# Patient Record
Sex: FEMALE | Race: WHITE | NOT HISPANIC OR LATINO | Employment: OTHER | URBAN - METROPOLITAN AREA
[De-identification: names, ages, dates, MRNs, and addresses within clinical notes are randomized per-mention and may not be internally consistent; named-entity substitution may affect disease eponyms.]

---

## 2018-11-04 ENCOUNTER — HOSPITAL ENCOUNTER (EMERGENCY)
Facility: HOSPITAL | Age: 68
Discharge: HOME | End: 2018-11-04
Attending: EMERGENCY MEDICINE | Admitting: EMERGENCY MEDICINE

## 2018-11-04 ENCOUNTER — APPOINTMENT (OUTPATIENT)
Dept: GENERAL RADIOLOGY | Facility: HOSPITAL | Age: 68
End: 2018-11-04

## 2018-11-04 VITALS
WEIGHT: 150 LBS | RESPIRATION RATE: 18 BRPM | BODY MASS INDEX: 26.58 KG/M2 | DIASTOLIC BLOOD PRESSURE: 90 MMHG | OXYGEN SATURATION: 92 % | HEIGHT: 63 IN | TEMPERATURE: 98.5 F | HEART RATE: 79 BPM | SYSTOLIC BLOOD PRESSURE: 136 MMHG

## 2018-11-04 DIAGNOSIS — S52.571A OTHER CLOSED INTRA-ARTICULAR FRACTURE OF DISTAL END OF RIGHT RADIUS, INITIAL ENCOUNTER: ICD-10-CM

## 2018-11-04 DIAGNOSIS — Z86.79 HISTORY OF HYPERTENSION: ICD-10-CM

## 2018-11-04 DIAGNOSIS — G35 MULTIPLE SCLEROSIS (HCC): ICD-10-CM
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
## 2018-11-04 PROCEDURE — 99283 EMERGENCY DEPT VISIT LOW MDM: CPT

## 2018-11-04 PROCEDURE — 73110 X-RAY EXAM OF WRIST: CPT

## 2018-11-04 RX ORDER — HYDROCODONE BITARTRATE AND ACETAMINOPHEN 5; 325 MG/1; MG/1
TABLET ORAL
Status: DISCONTINUED
Start: 2018-11-04 | End: 2018-11-04 | Stop reason: HOSPADM

## 2018-11-04 RX ORDER — TIZANIDINE 4 MG/1
4 TABLET ORAL NIGHTLY
Status: ON HOLD | COMMUNITY
End: 2018-11-14

## 2018-11-04 RX ORDER — NITROGLYCERIN 400 UG/1
1 SPRAY ORAL
Status: ON HOLD | COMMUNITY
End: 2018-11-14 | Stop reason: ALTCHOICE

## 2018-11-04 RX ORDER — AMITRIPTYLINE HYDROCHLORIDE 50 MG/1
100 TABLET, FILM COATED ORAL NIGHTLY
COMMUNITY
End: 2019-09-24

## 2018-11-04 RX ORDER — BACLOFEN 10 MG/1
10 TABLET ORAL 3 TIMES DAILY
COMMUNITY
End: 2019-08-21

## 2018-11-04 RX ORDER — ASCORBIC ACID 500 MG
500 TABLET ORAL DAILY
COMMUNITY

## 2018-11-04 RX ORDER — CARVEDILOL 25 MG/1
25 TABLET ORAL DAILY
COMMUNITY

## 2018-11-04 RX ORDER — GABAPENTIN 300 MG/1
300 CAPSULE ORAL 3 TIMES DAILY
COMMUNITY
End: 2019-11-05

## 2018-11-04 RX ORDER — HYDROXYZINE HYDROCHLORIDE 25 MG/1
25 TABLET, FILM COATED ORAL 3 TIMES DAILY PRN
COMMUNITY
End: 2019-11-05

## 2018-11-04 RX ORDER — HYDROCODONE BITARTRATE AND ACETAMINOPHEN 5; 325 MG/1; MG/1
1 TABLET ORAL ONCE
Status: COMPLETED | OUTPATIENT
Start: 2018-11-04 | End: 2018-11-04

## 2018-11-04 RX ORDER — MULTIVIT WITH MINERALS/LUTEIN
2000 TABLET ORAL DAILY
COMMUNITY
End: 2018-11-13

## 2018-11-04 RX ORDER — HYDROCODONE BITARTRATE AND ACETAMINOPHEN 10; 325 MG/1; MG/1
1 TABLET ORAL EVERY 6 HOURS PRN
COMMUNITY
End: 2022-12-12

## 2018-11-04 RX ORDER — POLYETHYLENE GLYCOL 3350 17 G/17G
17 POWDER, FOR SOLUTION ORAL AS NEEDED
COMMUNITY

## 2018-11-04 RX ORDER — OMEPRAZOLE 20 MG/1
20 CAPSULE, DELAYED RELEASE ORAL DAILY
COMMUNITY
End: 2020-06-03 | Stop reason: DRUGHIGH

## 2018-11-04 RX ORDER — ALENDRONATE SODIUM 70 MG/1
70 TABLET ORAL
COMMUNITY

## 2018-11-04 RX ORDER — ALPRAZOLAM 1 MG/1
1.5 TABLET ORAL 2 TIMES DAILY PRN
COMMUNITY
End: 2022-12-12

## 2018-11-04 RX ORDER — ATORVASTATIN CALCIUM 40 MG/1
40 TABLET, FILM COATED ORAL NIGHTLY
COMMUNITY

## 2018-11-04 RX ORDER — ASPIRIN 81 MG/1
81 TABLET, CHEWABLE ORAL DAILY
COMMUNITY
End: 2018-11-13

## 2018-11-04 RX ADMIN — HYDROCODONE BITARTRATE AND ACETAMINOPHEN 1 TABLET: 5; 325 TABLET ORAL at 01:05

## 2018-11-04 NOTE — DISCHARGE INSTRUCTIONS
X-rays reveal impacted right distal radius fracture.  We have applied a splint.  She needs to continue her Norco as directed.  Follow up closely with orthopedist, Dr. Parks.  Return if worsening symptoms of swelling, numbness or tingling, or coolness to the touch on the right hand.  Ice as needed for swelling.

## 2018-11-04 NOTE — ED PROVIDER NOTES
Subjective   Ms. Odalis Mir is a 68 year old female with a hx of multiple sclerosis who presents to the ED with c/o a wrist injury s/p fall. Patient reports that she was walking in her home this evening around 2200 when she slipped and fell. Denies a head injury or loss of consciousness. States she landed on buttocks, but struck her right wrist and elbow against the floor trying to break her fall. Presents to the ED this evening with an obvious deformity to her right wrist. Endorses a considerable amount of pain in her right wrist, accompanied by tinging in her fingers and a numb sensation into her right forearm. Also notes a small abrasion to her right elbow, but denies any pain  Denies any back pain, neck pain, chest pain, or abdominal pain. No other pain in her other extremities. Denies a hx of prior injury to the right wrist but recalls a hx of carpal tunnel surgery and ganglion cystectomy in the right wrist. Right-handed.        History provided by:  Patient  Wrist Injury   Location:  Wrist  Wrist location:  R wrist  Injury: yes    Time since incident:  1 hour  Mechanism of injury: fall    Fall:     Fall occurred: slipped.    Impact surface:  Hard floor    Point of impact:  Buttocks and outstretched arms    Entrapped after fall: no    Pain details:     Quality:  Tingling    Radiates to:  R forearm    Severity:  Moderate    Onset quality:  Sudden    Timing:  Constant    Progression:  Unchanged  Handedness:  Right-handed  Foreign body present:  No foreign bodies  Relieved by:  Being still  Worsened by:  Movement  Associated symptoms: decreased range of motion, numbness and tingling    Associated symptoms: no back pain, no fatigue, no fever, no muscle weakness, no neck pain, no stiffness and no swelling        Review of Systems   Constitutional: Negative for chills, diaphoresis, fatigue and fever.   Respiratory: Negative for cough and shortness of breath.    Cardiovascular: Negative for chest pain and  palpitations.   Gastrointestinal: Negative for abdominal pain, diarrhea, nausea and vomiting.   Musculoskeletal: Positive for arthralgias (R wrist pain/deformity). Negative for back pain, neck pain and stiffness.   Neurological: Negative for dizziness and headaches.        Positive numbness/tingling   All other systems reviewed and are negative.      Past Medical History:   Diagnosis Date   • Multiple sclerosis (CMS/HCC)    • Osteoarthritis        Allergies   Allergen Reactions   • Shellfish-Derived Products Anaphylaxis   • Sulfa Antibiotics Anaphylaxis       Past Surgical History:   Procedure Laterality Date   • HYSTERECTOMY         History reviewed. No pertinent family history.    Social History     Social History   • Marital status: Single     Social History Main Topics   • Smoking status: Never Smoker   • Smokeless tobacco: Never Used   • Alcohol use No   • Drug use: No   • Sexual activity: Defer     Other Topics Concern   • Not on file         Objective   Physical Exam   Constitutional: She is oriented to person, place, and time. She appears well-developed and well-nourished. No distress.   HENT:   Head: Normocephalic and atraumatic.   Eyes: Conjunctivae are normal. No scleral icterus.   Neck: Normal range of motion. Neck supple.   Cardiovascular: Normal rate, regular rhythm, normal heart sounds and intact distal pulses.  Exam reveals no gallop and no friction rub.    No murmur heard.  Pulmonary/Chest: Effort normal and breath sounds normal. No respiratory distress. She has no wheezes. She has no rales.   Abdominal: Soft. Bowel sounds are normal. There is no tenderness. There is no guarding.   Musculoskeletal:        Right elbow: No medial epicondyle, no lateral epicondyle and no olecranon process tenderness noted.        Right wrist: She exhibits decreased range of motion, tenderness and deformity.        Cervical back: She exhibits no tenderness.        Thoracic back: She exhibits no tenderness.        Lumbar  back: She exhibits no tenderness.   No cervical, thoracic, or lumbar tenderness. Right wrist has an obvious deformity with limited range of motion and tenderness at the distal radius and ulna. Strong distal pulses, brisk capillary refill. Small abrasion to the right elbow. No right olecranon or epicondyle tenderness.   Neurological: She is alert and oriented to person, place, and time.   Skin: Skin is warm and dry. She is not diaphoretic.   Psychiatric: She has a normal mood and affect. Her behavior is normal.   Nursing note and vitals reviewed.      Procedures         ED Course  ED Course as of Nov 04 0158   Sun Nov 04, 2018 0151 I reviewed plain films of the right wrist and patient has an impacted right distal radius fracture.  There is no evidence of distal ulnar fracture.  Patient is neurovascularly intact with strong right radial and ulnar pulses and brisk capillary refill.  We will apply an Ortho-Glass sugar tong splint and recommend close follow-up with orthopedist, Dr. Parks.  Discussed results and plan of follow-up with patient.  She is to return if any worsening symptoms of swelling, numbness, or coolness to touch on the right hand.  [FC]      ED Course User Index  [FC] Britta Gregory, MARIJA       No results found for this or any previous visit (from the past 24 hour(s)).  Note: In addition to lab results from this visit, the labs listed above may include labs taken at another facility or during a different encounter within the last 24 hours. Please correlate lab times with ED admission and discharge times for further clarification of the services performed during this visit.    XR Wrist 3+ View Right   Final Result   1. Acute, displaced, slightly impacted, intra-articular fracture of the distal    radial metaphysis.    2. Mild dorsal wrist soft tissue swelling.       THIS DOCUMENT HAS BEEN ELECTRONICALLY SIGNED BY DEAN PAZ MD        Vitals:    11/04/18 0034   BP: 153/82   BP Location: Left arm  "  Patient Position: Sitting   Pulse: 82   Resp: 18   Temp: 98.5 °F (36.9 °C)   TempSrc: Oral   SpO2: 94%   Weight: 68 kg (150 lb)   Height: 160 cm (63\")     Medications   HYDROcodone-acetaminophen (NORCO) 5-325 MG per tablet 1 tablet (1 tablet Oral Given 11/4/18 0105)     ECG/EMG Results (last 24 hours)     ** No results found for the last 24 hours. **                      [facility]    Final diagnoses:   Fall, initial encounter   Other closed intra-articular fracture of distal end of right radius, initial encounter   Multiple sclerosis (CMS/[facility])   History of hypertension       Documentation assistance provided by stefanie Betts.  Information recorded by the scribvern was done at my direction and has been verified and validated by me.     Trent Betts  11/04/18 0109       Trent Betts  11/04/18 0127       Britta Gregory PA-C  11/04/18 0158       Britta Gregory PA-C  11/04/18 0158       Britta Gregory PA-C  11/04/18 0158    "

## 2018-11-06 ENCOUNTER — OFFICE VISIT (OUTPATIENT)
Dept: ORTHOPEDIC SURGERY | Facility: CLINIC | Age: 68
End: 2018-11-06

## 2018-11-06 VITALS — HEART RATE: 76 BPM | WEIGHT: 149.91 LBS | HEIGHT: 63 IN | OXYGEN SATURATION: 97 % | BODY MASS INDEX: 26.56 KG/M2

## 2018-11-06 DIAGNOSIS — S52.571A OTHER CLOSED INTRA-ARTICULAR FRACTURE OF DISTAL END OF RIGHT RADIUS, INITIAL ENCOUNTER: Primary | ICD-10-CM

## 2018-11-06 DIAGNOSIS — G35 MULTIPLE SCLEROSIS (HCC): ICD-10-CM

## 2018-11-06 PROCEDURE — 25600 CLTX DST RDL FX/EPHYS SEP WO: CPT | Performed by: ORTHOPAEDIC SURGERY

## 2018-11-06 PROCEDURE — 99204 OFFICE O/P NEW MOD 45 MIN: CPT | Performed by: ORTHOPAEDIC SURGERY

## 2018-11-06 NOTE — PROGRESS NOTES
[facility] [place] [facility] Note    Subjective     Pain of the Right Wrist (Fell at home 11/03/2018- persistent in nature- pain scale 8/10 today- mod factors- hydrocodone, Aleve, immobilization)      MT Mir is a 68 y.o. female.  Patient is a right-hand-dominant female with multiple sclerosis who sustained a ground-level fall on 11/3/2018.  She was seen at the [facility] ER and splinted and sent here for further evaluation and treatment.  She uses Lofstrand-type crutches to help with ambulation.  She is here with a caretaker and a friend from [place].  She's having 8 out of 10 pain.  She has been using hydrocodone and naproxen.     Past Medical History:   Diagnosis Date   • Multiple sclerosis (CMS/HCC)    • Osteoarthritis       Past Surgical History:   Procedure Laterality Date   • HAND SURGERY Right     thumb   • HYSTERECTOMY        Family History   Problem Relation Age of Onset   • COPD Mother    • Hypertension Father      Social History     Social History   • Marital status: Single     Spouse name: N/A   • Number of children: N/A   • Years of education: N/A     Occupational History   • Not on file.     Social History Main Topics   • Smoking status: Never Smoker   • Smokeless tobacco: Never Used   • Alcohol use No   • Drug use: No   • Sexual activity: Defer     Other Topics Concern   • Not on file     Social History Narrative   • No narrative on file      Current Outpatient Prescriptions on File Prior to Visit   Medication Sig Dispense Refill   • alendronate (FOSAMAX) 70 MG tablet Take 70 mg by mouth Every 7 (Seven) Days.     • ALPRAZolam (XANAX) 1 MG tablet Take 1.5 mg by mouth 2 (Two) Times a Day As Needed for Anxiety.     • amitriptyline (ELAVIL) 50 MG tablet Take 50 mg by mouth Every Night. 2 AT NIGHT TIME     • AMLODIPINE BESYLATE PO Take 10 mg by mouth Daily.     • aspirin 81 MG chewable tablet Chew 81 mg Daily.     • atorvastatin (LIPITOR) 40 MG tablet Take 40 mg by mouth Every Night.      • baclofen (LIORESAL) 10 MG tablet Take 10 mg by mouth 3 (Three) Times a Day.     • CARBAMAZEPINE PO Take 200 mg by mouth 5 (Five) Times a Day.     • Carboxymethylcellulose Sodium (STERILE LUBRICANT OP) Apply 1 dose to eye(s) as directed by provider As Needed.     • carvedilol (COREG) 25 MG tablet Take 25 mg by mouth Daily.     • FLUOXETINE HCL PO Take 60 mg by mouth Daily.     • gabapentin (NEURONTIN) 300 MG capsule Take 300 mg by mouth 4 (Four) Times a Day. 3-4 TIMES DAILY     • HYDROcodone-acetaminophen (NORCO)  MG per tablet Take 1 tablet by mouth 3 (Three) Times a Day As Needed for Moderate Pain .     • hydrOXYzine (ATARAX) 25 MG tablet Take 25 mg by mouth As Needed for Itching or Anxiety.     • losartan 50 MG tablet 2 tablet, hydrochlorothiazide 25 MG tablet 1 tablet Take 1 dose by mouth Daily.     • Multiple Vitamins-Minerals (CENTRUM SILVER PO) Take 1 tablet by mouth Daily.     • nitroglycerin (NITROLINGUAL) 0.4 MG/SPRAY spray Place 1 spray under the tongue Every 5 (Five) Minutes As Needed for Chest Pain.     • omeprazole (priLOSEC) 20 MG capsule Take 20 mg by mouth Daily.     • polyethylene glycol (MIRALAX) packet Take 17 g by mouth As Needed.     • tiZANidine (ZANAFLEX) 4 MG tablet Take 4 mg by mouth Every Night.     • Unable to find Take 1 each by mouth 2 (Two) Times a Day. TICFIDERA 240 MG 1 TABLET BID FOR MS     • vitamin C (ASCORBIC ACID) 500 MG tablet Take 500 mg by mouth Daily.     • vitamin E 1000 UNIT capsule Take 2,000 Units by mouth Daily.       No current facility-administered medications on file prior to visit.       Allergies   Allergen Reactions   • Shellfish-Derived Products Anaphylaxis   • Sulfa Antibiotics Anaphylaxis        The following portions of the patient's history were reviewed and updated as appropriate: allergies, current medications, past family history, past medical history, past social history, past surgical history and problem list.    Review of Systems  "  Constitutional: Negative.    HENT: Negative.    Eyes: Negative.    Respiratory: Negative.    Cardiovascular: Negative.    Gastrointestinal: Negative.    Endocrine: Negative.    Genitourinary: Negative.    Musculoskeletal: Positive for arthralgias and joint swelling.   Skin: Negative.    Allergic/Immunologic: Negative.    Neurological: Negative.    Hematological: Negative.    Psychiatric/Behavioral: Negative.         Objective      Physical Exam  Pulse 76   Ht 160 cm (62.99\")   Wt 68 kg (149 lb 14.6 oz)   SpO2 97%   BMI 26.56 kg/m²     Body mass index is 26.56 kg/m².    General  Mental Status - alert  General Appearance - cooperative, well groomed, not in acute distress  Orientation - Oriented X3  Build & Nutrition - well developed and well nourished  Posture - normal posture  Gait - normal gait     Integumentary  Global Assessment  Examination of related systems reveals - no lymphadenopathy  Ears:  No abnormality  Nose:  No mucous drainage  General Characteristics  Overall examination of the patient's skin reveals - no rashes, no evidence of scars, no suspicious lesions and no bruises.  Color - normal coloration of skin.  Vascular: Brisk capillary refill in all extremities    Ortho Exam  Peripheral Vascular   Right Upper Extremity    No cyanotic nail beds    Pink nail beds and rapid capillary refill   Palpation    Radial Pulse - Bilaterally normal    Musculoskeletal   Right Upper Extremity   Radius:    Inspection and Palpation:    Tenderness - Moderately tender and about the wrist    Swelling - present    Effusion - none    Muscle tone - no atrophy    Pulses - +2   Deformities/Malalignments/Discrepancies    Normal bony contour    There is a documented closed fracture : location - right - distal end             Imaging/Studies  We have reviewed x-rays from the hospital system from 11/4/2018.  We have reviewed the report as well.  Patient appears to have an intra-articular displaced fracture of the right distal " radius.  There is a split between the lunate and scaphoid facets without significant widening.  There is loss of volar tilt.    Assessment:  1. Other closed intra-articular fracture of distal end of right radius, initial encounter    2. Multiple sclerosis (CMS/[facility])        Plan:  1. Continue over-the-counter medication as needed for discomfort  2. We had a long discussion with the patient regarding treatment options and alternatives.  At this point, patient is not interested in surgical intervention.  This would help alleviate the loss of volar tilt and prevent further displacement but if she heals and her current position, I think her function will be satisfactory.  We will follow her closely and we have agreed that if she displaces further, we will consider surgical intervention at that point.  Patient will be placed in a sugar tong splint (plaster) for 3 weeks.  If everything looks good after 3 weeks, she'll be placed in a fiberglass short arm cast.  3. She will follow-up in one week with repeat x-rays in plaster.      Medical Decision Making  Management Options : over-the-counter medicine and close treatment of fracture or dislocation  Data/Risk: radiology tests and independent visualization of imaging, lab tests, or EMG/NCV    El Parks MD  11/06/18  5:43 PM

## 2018-11-07 ENCOUNTER — TELEPHONE (OUTPATIENT)
Dept: ORTHOPEDIC SURGERY | Facility: CLINIC | Age: 68
End: 2018-11-07

## 2018-11-07 NOTE — TELEPHONE ENCOUNTER
Just an FYI, the patient canceled her appointment that was made for tomorrow morning with Tanya due to transportation issues. She still has her appointment scheduled for 11/13 with Tanya.

## 2018-11-07 NOTE — TELEPHONE ENCOUNTER
She can come in tomorrow to see Tanya in the am and we can set her up to be done Friday.    Clemencia LUCIA

## 2018-11-07 NOTE — TELEPHONE ENCOUNTER
Patient called back and I advised that Dr. Parks would like to see her in clinic tomorrow on a PA schedule. She stated that she would call back if unable to make it in and keep her appointment for 11/13/18.

## 2018-11-07 NOTE — TELEPHONE ENCOUNTER
PT CALLING BECAUSE SHE WOULD LIKE TO TALK TO SOMEONE ABOUT THE SURGERY SHE POTENTIALLY COULD BE HAVING. HER NUMBER [phone].

## 2018-11-07 NOTE — TELEPHONE ENCOUNTER
The patient stated that she would rather change her mind to having the surgery that Dr. Parks had discussed with her at her appointment. She believes that she will be moving a lot quicker with the surgery versus being casted. What is the next step you want to take with the patient?    Tressa

## 2018-11-13 ENCOUNTER — OFFICE VISIT (OUTPATIENT)
Dept: ORTHOPEDIC SURGERY | Facility: CLINIC | Age: 68
End: 2018-11-13

## 2018-11-13 ENCOUNTER — ANESTHESIA EVENT (OUTPATIENT)
Dept: PERIOP | Facility: HOSPITAL | Age: 68
End: 2018-11-13

## 2018-11-13 ENCOUNTER — APPOINTMENT (OUTPATIENT)
Dept: PREADMISSION TESTING | Facility: HOSPITAL | Age: 68
End: 2018-11-13

## 2018-11-13 ENCOUNTER — PREP FOR SURGERY (OUTPATIENT)
Dept: OTHER | Facility: HOSPITAL | Age: 68
End: 2018-11-13

## 2018-11-13 VITALS — HEIGHT: 63 IN | BODY MASS INDEX: 26.56 KG/M2 | WEIGHT: 149.91 LBS | HEART RATE: 107 BPM | OXYGEN SATURATION: 100 %

## 2018-11-13 VITALS — HEIGHT: 63 IN | WEIGHT: 158.73 LBS | BODY MASS INDEX: 28.12 KG/M2

## 2018-11-13 DIAGNOSIS — S52.571D OTHER INTRAARTICULAR FRACTURE OF LOWER END OF RIGHT RADIUS, SUBSEQUENT ENCOUNTER FOR CLOSED FRACTURE WITH ROUTINE HEALING: Primary | ICD-10-CM

## 2018-11-13 DIAGNOSIS — S52.571D OTHER CLOSED INTRA-ARTICULAR FRACTURE OF DISTAL END OF RIGHT RADIUS WITH ROUTINE HEALING, SUBSEQUENT ENCOUNTER: Primary | ICD-10-CM

## 2018-11-13 PROBLEM — S52.501D CLOSED FRACTURE OF LOWER END OF RIGHT RADIUS WITH ROUTINE HEALING: Status: ACTIVE | Noted: 2018-11-13

## 2018-11-13 LAB
DEPRECATED RDW RBC AUTO: 43.1 FL (ref 37–54)
ERYTHROCYTE [DISTWIDTH] IN BLOOD BY AUTOMATED COUNT: 13.6 % (ref 11.3–14.5)
HCT VFR BLD AUTO: 36 % (ref 34.5–44)
HGB BLD-MCNC: 12 G/DL (ref 11.5–15.5)
MCH RBC QN AUTO: 28.9 PG (ref 27–31)
MCHC RBC AUTO-ENTMCNC: 33.3 G/DL (ref 32–36)
MCV RBC AUTO: 86.7 FL (ref 80–99)
PLATELET # BLD AUTO: 236 10*3/MM3 (ref 150–450)
PMV BLD AUTO: 8.8 FL (ref 6–12)
POTASSIUM BLD-SCNC: 4.2 MMOL/L (ref 3.5–5.5)
RBC # BLD AUTO: 4.15 10*6/MM3 (ref 3.89–5.14)
WBC NRBC COR # BLD: 5.56 10*3/MM3 (ref 3.5–10.8)

## 2018-11-13 PROCEDURE — 85027 COMPLETE CBC AUTOMATED: CPT | Performed by: ANESTHESIOLOGY

## 2018-11-13 PROCEDURE — 93010 ELECTROCARDIOGRAM REPORT: CPT | Performed by: INTERNAL MEDICINE

## 2018-11-13 PROCEDURE — 36415 COLL VENOUS BLD VENIPUNCTURE: CPT

## 2018-11-13 PROCEDURE — 93005 ELECTROCARDIOGRAM TRACING: CPT

## 2018-11-13 PROCEDURE — 84132 ASSAY OF SERUM POTASSIUM: CPT | Performed by: ANESTHESIOLOGY

## 2018-11-13 PROCEDURE — 99024 POSTOP FOLLOW-UP VISIT: CPT | Performed by: PHYSICIAN ASSISTANT

## 2018-11-13 RX ORDER — ACETAMINOPHEN 500 MG
1000 TABLET ORAL ONCE
Status: CANCELLED | OUTPATIENT
Start: 2018-11-13 | End: 2018-11-13

## 2018-11-13 RX ORDER — FAMOTIDINE 10 MG/ML
20 INJECTION, SOLUTION INTRAVENOUS ONCE
Status: CANCELLED | OUTPATIENT
Start: 2018-11-13 | End: 2018-11-13

## 2018-11-13 RX ORDER — CEFAZOLIN SODIUM 2 G/100ML
2 INJECTION, SOLUTION INTRAVENOUS ONCE
Status: CANCELLED | OUTPATIENT
Start: 2018-11-13 | End: 2018-11-13

## 2018-11-13 NOTE — DISCHARGE INSTRUCTIONS

## 2018-11-13 NOTE — PROGRESS NOTES
I have reviewed the notes, assessments, and/or procedures performed by Tanya Lipscomb PA-C, I concur with her documentation of Odalis Mir.    Patient's x-rays were reviewed from this afternoon.  She does not like the surgery on splinting that will be required to control rotation and would like to proceed with open reduction and internal fixation of her right distal radius.  The risks, benefits, and potential hazards were discussed with her and she appears to understand and would like to proceed.  We will get this on the schedule for tomorrow.

## 2018-11-13 NOTE — PROGRESS NOTES
"    [facility] [place] [facility] Note    Subjective     Follow-up (1 week follow up - Other closed intra-articular fracture of distal end of right radius)       MT Mir is a 68 y.o. female.  Patient returns today for 1 week follow-up right distal radius intra-articular comminuted fracture.  Initially she did not want to undergo surgical fixation but now she wants surgery.  She is currently in a sugar tong splint.  She is using both hydrocodone and intermittent use of Aleve for pain control.  Pain scale 6/10.  Severity the pain moderate.  Quality pain dull, aching, throbbing.  No reported fever, chills, night sweats or other constitutional symptoms.  Denies numbness and tingling into the extremity.    Patient states she was taking a blood thinner until approximately 1 month ago she was cleared by her cardiologist to stop that particular medication.      Objective      Physical Exam  Pulse 107   Ht 160 cm (62.99\")   Wt 68 kg (149 lb 14.6 oz)   SpO2 100%   BMI 26.56 kg/m²     Body mass index is 26.56 kg/m².        Ortho Exam  Peripheral Vascular   Right Upper Extremity    No cyanotic nail beds    Pink nail beds and rapid capillary refill   Palpation    Radial Pulse - Bilaterally normal    Musculoskeletal   Right Upper Extremity   Radius:    Inspection and Palpation: Sugar tong splint was left in place    Tenderness - subjectively reports pain to the wrist    Swelling - present    Effusion - none    Muscle tone - no atrophy    Pulses - +2    Motor/sensory - grossly intact C5-T1   Deformities/Malalignments/Discrepancies    Normal bony contour    There is a documented closed fracture : location - right - distal end         Imaging/Studies  Imaging Results (last 24 hours)     Procedure Component Value Units Date/Time    XR Wrist 3+ View Right [[id number]] Resulted:  11/13/18 1402     Updated:  11/13/18 1403    Narrative:       Right Wrist X-Ray    Indication: Pain    Views:  AP, Lateral, and Oblique "     Comparison: Right wrist 11/4/2018    Findings:  The fracture of the right distal radius appears to be stable and alignment   and appearance.  There has been loss of radial inclination in the interim.    The volar tilt is stable in appearance.  There is splinting material in   place which appears some of the fine detail.  Normal soft tissues  Normal joint spaces  Alignment appears acceptable.      Impression:  Fracture of the right distal radius without significant   interim displacement.        Ordered plain film imaging of the right wrist in the splint.  Images reviewed by Dr. Parks.  She has had some loss of radial inclination.  Otherwise no further displacement noted to the right distal radius when compared to previous imaging.  See chart for official report.    Assessment:  1. Other intraarticular fracture of lower end of right radius, subsequent encounter for closed fracture with routine healing        Plan:  1. Discussion was had the patient regarding exam, imaging, assessment and treatment options.  2. Reviewed with her the risks, indications and benefits of surgery versus nonoperative management.  Patient stated she would still like to proceed on with operative management.  3. Patient will do taken up from scheduled for surgery tomorrow with Dr. Parks on 11/14/18.  4. Her sugar tong splint is to remain in place until we take off in the OR tomorrow for ORIF to right distal radius.  5. Questions and concerns answered.    Indications, risks, benefits and alternatives of surgical versus continued nonsurgical treatment were discussed with the patient. Surgical risks include but are not limited to pain, bleeding, infection, failure to relieve symptoms, need for further procedures, recurrence of symptoms, damage to healthy adjacent structures, hardware loosening/failure, malunion/nonunion, stiffness, weakness, scar, DVT, loss of limb or life. We also discussed the postoperative protocol and expected  outcome. All questions were answered; the patient would like to proceed with surgical intervention.       Tanya Lipscomb PA-C  11/13/18  3:05 PM

## 2018-11-13 NOTE — H&P (VIEW-ONLY)
"    [facility] – [place], [place] Orthopaedic Surgery Clinic Note    Subjective     Follow-up (1 week follow up - Other closed intra-articular fracture of distal end of right radius)       MT Mir is a 68 y.o. female.  Patient returns today for 1 week follow-up right distal radius intra-articular comminuted fracture.  Initially she did not want to undergo surgical fixation but now she wants surgery.  She is currently in a sugar tong splint.  She is using both hydrocodone and intermittent use of Aleve for pain control.  Pain scale 6/10.  Severity the pain moderate.  Quality pain dull, aching, throbbing.  No reported fever, chills, night sweats or other constitutional symptoms.  Denies numbness and tingling into the extremity.    Patient states she was taking a blood thinner until approximately 1 month ago she was cleared by her cardiologist to stop that particular medication.      Objective      Physical Exam  Pulse 107   Ht 160 cm (62.99\")   Wt 68 kg (149 lb 14.6 oz)   SpO2 100%   BMI 26.56 kg/m²     Body mass index is 26.56 kg/m².        Ortho Exam  Peripheral Vascular   Right Upper Extremity    No cyanotic nail beds    Pink nail beds and rapid capillary refill   Palpation    Radial Pulse - Bilaterally normal    Musculoskeletal   Right Upper Extremity   Radius:    Inspection and Palpation: Sugar tong splint was left in place    Tenderness - subjectively reports pain to the wrist    Swelling - present    Effusion - none    Muscle tone - no atrophy    Pulses - +2    Motor/sensory - grossly intact C5-T1   Deformities/Malalignments/Discrepancies    Normal bony contour    There is a documented closed fracture : location - right - distal end         Imaging/Studies  Imaging Results (last 24 hours)     Procedure Component Value Units Date/Time    XR Wrist 3+ View Right [[id number]] Resulted:  11/13/18 1402     Updated:  11/13/18 1403    Narrative:       Right Wrist X-Ray    Indication: Pain    Views:  AP, Lateral, and Oblique "     Comparison: Right wrist 11/4/2018    Findings:  The fracture of the right distal radius appears to be stable and alignment   and appearance.  There has been loss of radial inclination in the interim.    The volar tilt is stable in appearance.  There is splinting material in   place which appears some of the fine detail.  Normal soft tissues  Normal joint spaces  Alignment appears acceptable.      Impression:  Fracture of the right distal radius without significant   interim displacement.        Ordered plain film imaging of the right wrist in the splint.  Images reviewed by Dr. Parks.  She has had some loss of radial inclination.  Otherwise no further displacement noted to the right distal radius when compared to previous imaging.  See chart for official report.    Assessment:  1. Other intraarticular fracture of lower end of right radius, subsequent encounter for closed fracture with routine healing        Plan:  1. Discussion was had the patient regarding exam, imaging, assessment and treatment options.  2. Reviewed with her the risks, indications and benefits of surgery versus nonoperative management.  Patient stated she would still like to proceed on with operative management.  3. Patient will do taken up from scheduled for surgery tomorrow with Dr. Parks on 11/14/18.  4. Her sugar tong splint is to remain in place until we take off in the OR tomorrow for ORIF to right distal radius.  5. Questions and concerns answered.    Indications, risks, benefits and alternatives of surgical versus continued nonsurgical treatment were discussed with the patient. Surgical risks include but are not limited to pain, bleeding, infection, failure to relieve symptoms, need for further procedures, recurrence of symptoms, damage to healthy adjacent structures, hardware loosening/failure, malunion/nonunion, stiffness, weakness, scar, DVT, loss of limb or life. We also discussed the postoperative protocol and expected  outcome. All questions were answered; the patient would like to proceed with surgical intervention.       Tanya Lipscomb PA-C  11/13/18  3:05 PM

## 2018-11-14 ENCOUNTER — ANESTHESIA (OUTPATIENT)
Dept: PERIOP | Facility: HOSPITAL | Age: 68
End: 2018-11-14

## 2018-11-14 ENCOUNTER — HOSPITAL ENCOUNTER (OUTPATIENT)
Facility: HOSPITAL | Age: 68
Discharge: HOME | End: 2018-11-14
Attending: ORTHOPAEDIC SURGERY | Admitting: ORTHOPAEDIC SURGERY

## 2018-11-14 VITALS
BODY MASS INDEX: 28 KG/M2 | RESPIRATION RATE: 16 BRPM | SYSTOLIC BLOOD PRESSURE: 137 MMHG | DIASTOLIC BLOOD PRESSURE: 91 MMHG | OXYGEN SATURATION: 97 % | HEIGHT: 63 IN | TEMPERATURE: 98 F | HEART RATE: 90 BPM | WEIGHT: 158 LBS

## 2018-11-14 DIAGNOSIS — S52.571D OTHER CLOSED INTRA-ARTICULAR FRACTURE OF DISTAL END OF RIGHT RADIUS WITH ROUTINE HEALING, SUBSEQUENT ENCOUNTER: ICD-10-CM

## 2018-11-14 PROBLEM — S52.501A CLOSED FRACTURE OF RIGHT DISTAL RADIUS: Status: ACTIVE | Noted: 2018-11-14

## 2018-11-14 LAB — GLUCOSE BLDC GLUCOMTR-MCNC: 95 MG/DL (ref 70–130)

## 2018-11-14 PROCEDURE — 25010000002 PROPOFOL 10 MG/ML EMULSION: Performed by: NURSE ANESTHETIST, CERTIFIED REGISTERED

## 2018-11-14 PROCEDURE — 25609 OPTX DST RD XART FX/EP SEP3+: CPT | Performed by: ORTHOPAEDIC SURGERY

## 2018-11-14 PROCEDURE — C1713 ANCHOR/SCREW BN/BN,TIS/BN: HCPCS | Performed by: ORTHOPAEDIC SURGERY

## 2018-11-14 PROCEDURE — 25609 OPTX DST RD XART FX/EP SEP3+: CPT | Performed by: PHYSICIAN ASSISTANT

## 2018-11-14 PROCEDURE — A9270 NON-COVERED ITEM OR SERVICE: HCPCS | Performed by: ORTHOPAEDIC SURGERY

## 2018-11-14 PROCEDURE — 25010000003 CEFAZOLIN IN DEXTROSE 2-4 GM/100ML-% SOLUTION: Performed by: ORTHOPAEDIC SURGERY

## 2018-11-14 PROCEDURE — 25010000002 FENTANYL CITRATE (PF) 100 MCG/2ML SOLUTION: Performed by: NURSE ANESTHETIST, CERTIFIED REGISTERED

## 2018-11-14 PROCEDURE — 82962 GLUCOSE BLOOD TEST: CPT

## 2018-11-14 PROCEDURE — 25010000002 KETOROLAC TROMETHAMINE PER 15 MG: Performed by: NURSE ANESTHETIST, CERTIFIED REGISTERED

## 2018-11-14 PROCEDURE — A9270 NON-COVERED ITEM OR SERVICE: HCPCS | Performed by: ANESTHESIOLOGY

## 2018-11-14 PROCEDURE — 63710000001 FAMOTIDINE 20 MG TABLET: Performed by: ANESTHESIOLOGY

## 2018-11-14 PROCEDURE — 63710000001 ACETAMINOPHEN 500 MG TABLET: Performed by: ORTHOPAEDIC SURGERY

## 2018-11-14 PROCEDURE — 25010000002 DEXAMETHASONE PER 1 MG: Performed by: NURSE ANESTHETIST, CERTIFIED REGISTERED

## 2018-11-14 PROCEDURE — 25010000002 ONDANSETRON PER 1 MG: Performed by: NURSE ANESTHETIST, CERTIFIED REGISTERED

## 2018-11-14 DEVICE — IMPLANTABLE DEVICE: Type: IMPLANTABLE DEVICE | Site: WRIST | Status: FUNCTIONAL

## 2018-11-14 DEVICE — SCRW CORT 3.5X12MM: Type: IMPLANTABLE DEVICE | Site: WRIST | Status: FUNCTIONAL

## 2018-11-14 RX ORDER — FENTANYL CITRATE 50 UG/ML
50 INJECTION, SOLUTION INTRAMUSCULAR; INTRAVENOUS
Status: DISCONTINUED | OUTPATIENT
Start: 2018-11-14 | End: 2018-11-14 | Stop reason: HOSPADM

## 2018-11-14 RX ORDER — CEFAZOLIN SODIUM 2 G/100ML
2 INJECTION, SOLUTION INTRAVENOUS ONCE
Status: COMPLETED | OUTPATIENT
Start: 2018-11-14 | End: 2018-11-14

## 2018-11-14 RX ORDER — LIDOCAINE HYDROCHLORIDE 10 MG/ML
INJECTION, SOLUTION INFILTRATION; PERINEURAL AS NEEDED
Status: DISCONTINUED | OUTPATIENT
Start: 2018-11-14 | End: 2018-11-14 | Stop reason: SURG

## 2018-11-14 RX ORDER — SODIUM CHLORIDE 0.9 % (FLUSH) 0.9 %
3-10 SYRINGE (ML) INJECTION AS NEEDED
Status: DISCONTINUED | OUTPATIENT
Start: 2018-11-14 | End: 2018-11-14 | Stop reason: HOSPADM

## 2018-11-14 RX ORDER — PROPOFOL 10 MG/ML
VIAL (ML) INTRAVENOUS CONTINUOUS PRN
Status: DISCONTINUED | OUTPATIENT
Start: 2018-11-14 | End: 2018-11-14 | Stop reason: SURG

## 2018-11-14 RX ORDER — PROMETHAZINE HYDROCHLORIDE 25 MG/1
25 SUPPOSITORY RECTAL ONCE AS NEEDED
Status: DISCONTINUED | OUTPATIENT
Start: 2018-11-14 | End: 2018-11-14 | Stop reason: HOSPADM

## 2018-11-14 RX ORDER — PROPOFOL 10 MG/ML
VIAL (ML) INTRAVENOUS AS NEEDED
Status: DISCONTINUED | OUTPATIENT
Start: 2018-11-14 | End: 2018-11-14 | Stop reason: SURG

## 2018-11-14 RX ORDER — SODIUM CHLORIDE, SODIUM LACTATE, POTASSIUM CHLORIDE, CALCIUM CHLORIDE 600; 310; 30; 20 MG/100ML; MG/100ML; MG/100ML; MG/100ML
9 INJECTION, SOLUTION INTRAVENOUS CONTINUOUS
Status: DISCONTINUED | OUTPATIENT
Start: 2018-11-14 | End: 2018-11-14 | Stop reason: HOSPADM

## 2018-11-14 RX ORDER — FAMOTIDINE 20 MG/1
20 TABLET, FILM COATED ORAL ONCE
Status: COMPLETED | OUTPATIENT
Start: 2018-11-14 | End: 2018-11-14

## 2018-11-14 RX ORDER — EPHEDRINE SULFATE 50 MG/ML
5 INJECTION, SOLUTION INTRAVENOUS ONCE AS NEEDED
Status: DISCONTINUED | OUTPATIENT
Start: 2018-11-14 | End: 2018-11-14 | Stop reason: HOSPADM

## 2018-11-14 RX ORDER — PROMETHAZINE HYDROCHLORIDE 25 MG/ML
6.25 INJECTION, SOLUTION INTRAMUSCULAR; INTRAVENOUS ONCE AS NEEDED
Status: DISCONTINUED | OUTPATIENT
Start: 2018-11-14 | End: 2018-11-14 | Stop reason: HOSPADM

## 2018-11-14 RX ORDER — BUPIVACAINE HYDROCHLORIDE 5 MG/ML
INJECTION, SOLUTION EPIDURAL; INTRACAUDAL AS NEEDED
Status: DISCONTINUED | OUTPATIENT
Start: 2018-11-14 | End: 2018-11-14 | Stop reason: HOSPADM

## 2018-11-14 RX ORDER — DEXAMETHASONE SODIUM PHOSPHATE 4 MG/ML
INJECTION, SOLUTION INTRA-ARTICULAR; INTRALESIONAL; INTRAMUSCULAR; INTRAVENOUS; SOFT TISSUE AS NEEDED
Status: DISCONTINUED | OUTPATIENT
Start: 2018-11-14 | End: 2018-11-14 | Stop reason: SURG

## 2018-11-14 RX ORDER — LIDOCAINE HYDROCHLORIDE 10 MG/ML
0.5 INJECTION, SOLUTION EPIDURAL; INFILTRATION; INTRACAUDAL; PERINEURAL ONCE AS NEEDED
Status: COMPLETED | OUTPATIENT
Start: 2018-11-14 | End: 2018-11-14

## 2018-11-14 RX ORDER — ONDANSETRON 2 MG/ML
INJECTION INTRAMUSCULAR; INTRAVENOUS AS NEEDED
Status: DISCONTINUED | OUTPATIENT
Start: 2018-11-14 | End: 2018-11-14 | Stop reason: SURG

## 2018-11-14 RX ORDER — ACETAMINOPHEN 500 MG
1000 TABLET ORAL ONCE
Status: COMPLETED | OUTPATIENT
Start: 2018-11-14 | End: 2018-11-14

## 2018-11-14 RX ORDER — PROMETHAZINE HYDROCHLORIDE 25 MG/1
25 TABLET ORAL ONCE AS NEEDED
Status: DISCONTINUED | OUTPATIENT
Start: 2018-11-14 | End: 2018-11-14 | Stop reason: HOSPADM

## 2018-11-14 RX ORDER — KETOROLAC TROMETHAMINE 30 MG/ML
INJECTION, SOLUTION INTRAMUSCULAR; INTRAVENOUS AS NEEDED
Status: DISCONTINUED | OUTPATIENT
Start: 2018-11-14 | End: 2018-11-14 | Stop reason: SURG

## 2018-11-14 RX ORDER — FENTANYL CITRATE 50 UG/ML
INJECTION, SOLUTION INTRAMUSCULAR; INTRAVENOUS AS NEEDED
Status: DISCONTINUED | OUTPATIENT
Start: 2018-11-14 | End: 2018-11-14 | Stop reason: SURG

## 2018-11-14 RX ORDER — SODIUM CHLORIDE 0.9 % (FLUSH) 0.9 %
3 SYRINGE (ML) INJECTION EVERY 12 HOURS SCHEDULED
Status: DISCONTINUED | OUTPATIENT
Start: 2018-11-14 | End: 2018-11-14 | Stop reason: HOSPADM

## 2018-11-14 RX ORDER — NITROGLYCERIN 0.4 MG/1
0.4 TABLET SUBLINGUAL
COMMUNITY
End: 2022-12-12

## 2018-11-14 RX ADMIN — FAMOTIDINE 20 MG: 20 TABLET ORAL at 12:02

## 2018-11-14 RX ADMIN — KETOROLAC TROMETHAMINE 15 MG: 30 INJECTION, SOLUTION INTRAMUSCULAR at 15:58

## 2018-11-14 RX ADMIN — FENTANYL CITRATE 50 MCG: 50 INJECTION, SOLUTION INTRAMUSCULAR; INTRAVENOUS at 16:35

## 2018-11-14 RX ADMIN — FENTANYL CITRATE 50 MCG: 50 INJECTION, SOLUTION INTRAMUSCULAR; INTRAVENOUS at 16:50

## 2018-11-14 RX ADMIN — PROPOFOL 25 MCG/KG/MIN: 10 INJECTION, EMULSION INTRAVENOUS at 14:50

## 2018-11-14 RX ADMIN — LIDOCAINE HYDROCHLORIDE 50 MG: 10 INJECTION, SOLUTION INFILTRATION; PERINEURAL at 14:41

## 2018-11-14 RX ADMIN — FENTANYL CITRATE 50 MCG: 50 INJECTION, SOLUTION INTRAMUSCULAR; INTRAVENOUS at 15:38

## 2018-11-14 RX ADMIN — SODIUM CHLORIDE, POTASSIUM CHLORIDE, SODIUM LACTATE AND CALCIUM CHLORIDE 9 ML/HR: 600; 310; 30; 20 INJECTION, SOLUTION INTRAVENOUS at 11:56

## 2018-11-14 RX ADMIN — PROPOFOL 150 MG: 10 INJECTION, EMULSION INTRAVENOUS at 14:41

## 2018-11-14 RX ADMIN — DEXAMETHASONE SODIUM PHOSPHATE 8 MG: 4 INJECTION, SOLUTION INTRAMUSCULAR; INTRAVENOUS at 14:41

## 2018-11-14 RX ADMIN — LIDOCAINE HYDROCHLORIDE 0.2 ML: 10 INJECTION, SOLUTION EPIDURAL; INFILTRATION; INTRACAUDAL; PERINEURAL at 11:56

## 2018-11-14 RX ADMIN — FENTANYL CITRATE 50 MCG: 50 INJECTION, SOLUTION INTRAMUSCULAR; INTRAVENOUS at 14:41

## 2018-11-14 RX ADMIN — ACETAMINOPHEN 1000 MG: 500 TABLET, FILM COATED ORAL at 12:02

## 2018-11-14 RX ADMIN — CEFAZOLIN SODIUM 2 G: 2 INJECTION, SOLUTION INTRAVENOUS at 14:37

## 2018-11-14 RX ADMIN — FENTANYL CITRATE 50 MCG: 50 INJECTION, SOLUTION INTRAMUSCULAR; INTRAVENOUS at 15:33

## 2018-11-14 RX ADMIN — FENTANYL CITRATE 50 MCG: 50 INJECTION, SOLUTION INTRAMUSCULAR; INTRAVENOUS at 17:05

## 2018-11-14 RX ADMIN — ONDANSETRON 4 MG: 2 INJECTION INTRAMUSCULAR; INTRAVENOUS at 15:57

## 2018-11-14 RX ADMIN — FENTANYL CITRATE 50 MCG: 50 INJECTION, SOLUTION INTRAMUSCULAR; INTRAVENOUS at 16:45

## 2018-11-14 RX ADMIN — FENTANYL CITRATE 50 MCG: 50 INJECTION, SOLUTION INTRAMUSCULAR; INTRAVENOUS at 15:00

## 2018-11-14 NOTE — INTERVAL H&P NOTE
Pre-Op H&P (See Recent Office Note Attached for Full H&P)    Chief complaint: Right wrist fracture    HPI:      Patient is a 68 y.o. female who presents with a  right distal radius intra-articular comminuted fracture.  Initially, she did not want to undergo surgical fixation, but now she wants surgery.  She is currently in a sugar tong splint.  She is using both hydrocodone and intermittent use of Aleve for pain control.  Pain scale 6/10.  Severity the pain moderate.  Quality pain dull, aching, throbbing.  No reported fever, chills, night sweats or other constitutional symptoms.  Denies numbness and tingling into the extremity. She has elected to proceed with an open reduction internal fixation right distal radius fracture.      Review of Systems:  General ROS:  no fever, chills, rashes, No change since last office visit  Cardiovascular ROS: no chest pain or dyspnea on exertion; +HTN, +hyperlipidemia  Respiratory ROS: no cough, shortness of breath, or wheezing      Meds:    No current facility-administered medications on file prior to encounter.      Current Outpatient Medications on File Prior to Encounter   Medication Sig Dispense Refill   • alendronate (FOSAMAX) 70 MG tablet Take 70 mg by mouth Every 7 (Seven) Days. Takes on MON     • ALPRAZolam (XANAX) 1 MG tablet Take 1.5 mg by mouth 2 (Two) Times a Day As Needed for Anxiety.     • amitriptyline (ELAVIL) 50 MG tablet Take 50 mg by mouth Every Night. 2 AT NIGHT TIME     • AMLODIPINE BESYLATE PO Take 10 mg by mouth Daily.     • atorvastatin (LIPITOR) 40 MG tablet Take 40 mg by mouth Every Night.     • baclofen (LIORESAL) 10 MG tablet Take 10 mg by mouth 3 (Three) Times a Day.     • CARBAMAZEPINE PO Take 200 mg by mouth 5 (Five) Times a Day.     • Carboxymethylcellulose Sodium (STERILE LUBRICANT OP) Apply 1 dose to eye(s) as directed by provider As Needed.     • carvedilol (COREG) 25 MG tablet Take 25 mg by mouth Daily.     • FLUOXETINE HCL PO Take 60 mg by mouth  "Daily.     • gabapentin (NEURONTIN) 300 MG capsule Take 300 mg by mouth 4 (Four) Times a Day. 3-4 TIMES DAILY     • HYDROcodone-acetaminophen (NORCO)  MG per tablet Take 1 tablet by mouth 3 (Three) Times a Day As Needed for Moderate Pain .     • hydrOXYzine (ATARAX) 25 MG tablet Take 25 mg by mouth As Needed for Itching or Anxiety.     • losartan 50 MG tablet 2 tablet, hydrochlorothiazide 25 MG tablet 1 tablet Take 1 dose by mouth Daily.     • Multiple Vitamins-Minerals (CENTRUM SILVER PO) Take 1 tablet by mouth Daily.     • omeprazole (priLOSEC) 20 MG capsule Take 20 mg by mouth Daily.     • polyethylene glycol (MIRALAX) packet Take 17 g by mouth As Needed (constipation).     • tiZANidine (ZANAFLEX) 4 MG tablet Take 4 mg by mouth Every Night.     • Unable to find Take 1 each by mouth 2 (Two) Times a Day. TICFIDERA 240 MG 1 TABLET BID FOR MS     • vitamin C (ASCORBIC ACID) 500 MG tablet Take 500 mg by mouth Daily.     • [DISCONTINUED] nitroglycerin (NITROLINGUAL) 0.4 MG/SPRAY spray Place 1 spray under the tongue Every 5 (Five) Minutes As Needed for Chest Pain.         Vital Signs:  /76 (BP Location: Right arm, Patient Position: Lying)   Pulse 71   Temp 97.4 °F (36.3 °C) (Temporal)   Resp 18   Ht 160 cm (63\")   Wt 71.7 kg (158 lb)   SpO2 96%   BMI 27.99 kg/m²     Physical Exam:    CV:  S1S2 regular rate and rhythm, no murmur               Resp:  Clear to auscultation; respirations regular, even and unlabored    Results Review:   I have reviewed the patient's recent clinical results.    Cancer Staging (if applicable)  Cancer Patient: __ yes _X_no __unknown; If yes, clinical stage T:__ N:__M:__, stage group or __N/A    Assessment: Right distal radius intra-articular comminuted fracture    Plan: Open reduction internal fixation right distal radius fracture        Cori Carvajal, APRN  11/14/2018   11:48 AM    "

## 2018-11-14 NOTE — ANESTHESIA PREPROCEDURE EVALUATION
Anesthesia Evaluation     Patient summary reviewed and Nursing notes reviewed   no history of anesthetic complications:  NPO Solid Status: > 8 hours  NPO Liquid Status: > 8 hours           Airway   Mallampati: II  TM distance: >3 FB  Neck ROM: full  No difficulty expected  Dental - normal exam     Pulmonary - negative pulmonary ROS and normal exam    breath sounds clear to auscultation  Cardiovascular - normal exam    ECG reviewed  Rhythm: regular  Rate: normal    (+) hypertension,       Neuro/Psych  (+) seizures (secondary to MS - no recent),       ROS Comment: Multiple sclerosis - no recent exacerbation  GI/Hepatic/Renal/Endo    (+)  GERD well controlled,      Musculoskeletal         ROS comment: Right wrist fx  Abdominal    Substance History      OB/GYN          Other   (+) arthritis                     Anesthesia Plan    ASA 3     general   (No block d/t risk of MS exacerbation)  intravenous induction   Anesthetic plan, all risks, benefits, and alternatives have been provided, discussed and informed consent has been obtained with: patient.    Plan discussed with CRNA.

## 2018-11-14 NOTE — ANESTHESIA PROCEDURE NOTES
Airway  Urgency: elective    Airway not difficult    General Information and Staff    Patient location during procedure: OR  CRNA: [place] CRNA    Indications and Patient Condition  Indications for airway management: airway protection    Preoxygenated: yes  Mask difficulty assessment: 1 - vent by mask    Final Airway Details  Final airway type: supraglottic airway      Successful airway: I-gel  Size 4    Number of attempts at approach: 1    Additional Comments  LMA placed without difficulty, ventilation with assist, equal breath sounds and symmetric chest rise and fall

## 2018-11-14 NOTE — ANESTHESIA POSTPROCEDURE EVALUATION
Patient: Odalis Mir    Procedure Summary     Date:  11/14/18 Room / Location:   [facility] /  [facility]    Anesthesia Start:  1437 Anesthesia Stop:  1622    Procedure:  RIGHT WRIST INTRA  ARTICULAR  DISTAL RADIUS FRACTURE OPEN REDUCTION INTERNAL FIXATION (Right Hand) Diagnosis:       Other closed intra-articular fracture of distal end of right radius with routine healing, subsequent encounter      (Other closed intra-articular fracture of distal end of right radius with routine healing, subsequent encounter [S52.648W])    Surgeon:  El Parks MD Provider:  Bhavik Logan MD    Anesthesia Type:  general ASA Status:  3          Anesthesia Type: general  Last vitals  BP   125/76 (11/14/18 1122)   Temp   97.4 °F (36.3 °C) (11/14/18 1122)   Pulse   71 (11/14/18 1122)   Resp   18 (11/14/18 1122)     SpO2   96 % (11/14/18 1122)     Post Anesthesia Care and Evaluation    Patient location during evaluation: PACU  Patient participation: complete - patient participated  Level of consciousness: awake and alert  Pain score: 0  Pain management: adequate  Airway patency: patent  Anesthetic complications: No anesthetic complications  PONV Status: none  Cardiovascular status: hemodynamically stable and acceptable  Respiratory status: nonlabored ventilation, acceptable and nasal cannula  Hydration status: acceptable

## 2018-11-14 NOTE — OP NOTE
Orthopaedics Operative Report    PREOPERATIVE DIAGNOSIS: Intra-articular right distal radius fracture    POSTOPERATIVE DIAGNOSIS: Same    PROCEDURE PERFORMED: ORIF right 3 part intra-articular distal radius fracture    CPT: 33994    SURGEON: El Parks MD    ANESTHESIA:  Choice    STAFF:  Circulator: Chayo Hughes RN  Scrub Person: Tahmina Radha N  Nursing Assistant: Mica Toney; Fely Gomez  Assistant: Tanya Lipscomb PA-C    TOURNIQUET TIME: 55 minutes    ESTIMATED BLOOD LOSS: 10 mL    COMPLICATIONS: None apparent.    IMPLANTS: [employer] DVR plate    PREOPERATIVE ANTIBIOTICS: 2 g Kefzol    REFERRING PHYSICIAN: Irene Bailey M.D.    INDICATIONS: Loss of appropriate alignment     DESCRIPTION OF PROCEDURE: After informed consent was obtained, the patient was taken to the operating room.  After the smooth induction of general anesthesia, the right upper extremity was prepped and draped in the usual fashion for this type of procedure.  No block was placed due to the patient's history of MS.  After timeout to verify the site and the procedure to be performed, we began with exsanguination of the right upper extremity using an Esmarch bandage and inflation of tourniquet to 250 mmHg.  We made our standard FCR approach to the right distal radius.  Patient had undergone CMC arthroplasty with ligament reconstruction and FCR tendon interposition and therefore the FCR tendon was not present.  We identified our radial artery.  This was retracted radially and protected throughout the procedure.  We then made an incision in the floor of the FCR tendon sheath and gently swept the FPL tendon ulnarly.  We then identified our pronator quadratus which was incised in L-shaped fashion to expose the volar surface of the distal radius.  The intra-articular fracture was identified and anatomically aligned.  It was then provisionally pinned with a percutaneous pin through the radial styloid.  This  held the fracture in appropriate alignment.  The appropriate sized standard 3-hole volar plate was applied to the slotted shaft hole in the plate.  We then placed 6 points of fixation distally and 2 more points of fixation proximally.  Screws were checked for appropriate depth and changed accordingly.  We then ranged the wrist under fluoroscopic imaging and it was felt to be stable.  The screws distally were felt to be appropriate length as well.  The K wire was then removed.  We thoroughly irrigated the incision.  Our pronator was repaired.  We then deflated tourniquet.  Hemostasis was obtained.  We injected local anesthetic into the subcutaneous tissue and then closed the subcutaneous layer using 3-0 Vicryl and the skin was closed using running 3-0 nylon.  Sterile dressings were then applied.  Patient was placed in a well-padded volar wrist splint and neutral wrist dorsiflexion.  She was then taken to the recovery room in stable condition.  All counts were correct postoperatively and I performed the case.  Tanya Hernandez PA-C was present and necessary for positioning, approach, retraction, instrumentation, and closure and application of the splint.    POSTOPERATIVE PLAN:  1. Weight bearing status:  Nonweightbearing right upper extremity  2.  Hydrocodone for pain control  3. Follow-up in 2 weeks for wound check and suture removal  4. Keep incisions clean and dry  5.  Discharge home clear by anesthesia        El Parks M.D.*

## 2018-12-06 ENCOUNTER — OFFICE VISIT (OUTPATIENT)
Dept: ORTHOPEDIC SURGERY | Facility: CLINIC | Age: 68
End: 2018-12-06

## 2018-12-06 DIAGNOSIS — Z98.890 S/P ORIF (OPEN REDUCTION INTERNAL FIXATION) FRACTURE: Primary | ICD-10-CM

## 2018-12-06 DIAGNOSIS — Z87.81 S/P ORIF (OPEN REDUCTION INTERNAL FIXATION) FRACTURE: Primary | ICD-10-CM

## 2018-12-06 DIAGNOSIS — S52.571D OTHER INTRAARTICULAR FRACTURE OF LOWER END OF RIGHT RADIUS, SUBSEQUENT ENCOUNTER FOR CLOSED FRACTURE WITH ROUTINE HEALING: ICD-10-CM

## 2018-12-06 PROCEDURE — 99024 POSTOP FOLLOW-UP VISIT: CPT | Performed by: PHYSICIAN ASSISTANT

## 2018-12-06 NOTE — PROGRESS NOTES
[facility] – [place] Orthopaedic Surgery Clinic Note    Subjective     Post-op (3 weeks status post Right Wrist Intraarticular Distal Radius Fracture Open Reduction Internal Fixation 11/14/18)       MT Mir is a 68 y.o. female.  Patient returns today for initial postop visit status post ORIF right distal radius on the above date by Dr. Parks.  She has no complaints or issues.  Patient removed the splint after 3 or 4 days has not has not been wearing anything on the wrist.  She states that she's been nonweightbearing as directed.  She denies any numbness or tingling into the extremity.  No reported fever, chills, night sweats or other constitutional symptoms.         Objective      Physical Exam  There were no vitals taken for this visit.    There is no height or weight on file to calculate BMI.        Ortho Exam    Right Upper Extremity:        Musculoskeletal     Inspection and Palpation:      Hand/Wrist:      Tenderness - none    Swelling - minimal     ROM:  Slightly diminished but within normal limits    Motor: Grossly intact radial, ulnar, median, AIN, PIN    Sensory: Grossly intact to light touch radial, ulnar, median nerve distributions    Vascular: 2+ radial pulse with brisk capillary refill noted and each digit.       Incision:  Surgical incision site is healing well without redness, warmth, drainage or evidence of infection.  Suture remains in place.    Imaging:  Ordered plain film imaging of the right wrist.  Images reviewed by Dr. Parks.  Positive healing noted to the distal radius.  Alignment remains acceptable.  Hardware is intact.  See chart for official report.      Assessment:  1. S/P ORIF (open reduction internal fixation) fracture    2. Other intraarticular fracture of lower end of right radius, subsequent encounter for closed fracture with routine healing        Plan:  1. Patient was provided with a cockup wrist splint.  2. I offered to send her to physical therapy to help assist  with range of motion but she declined stating she can do the exercises on her own.  A hand and wrist exercise sheet was provided to the patient.  3. Patient understands that she needs to be nonweightbearing to the right wrist.  She reports that that's the arm she uses her crutches in.  She has been using the crutch on the left side since surgery.  She does have a walker and can get a platform for to offload the wrist if she's having issues using a crutch on the left side.  Patient reported that she'll think about this option.  She was informed that if she weightbears on the right side as its healing she could cause collapse to the repair.  4. She'll follow back up in 4 weeks for repeat evaluation, sooner if issues arise.  During that appointment she'll need pre-clinic imaging of the right wrist.  5. Question and concerns answered      Tanya Lipscomb PA-C  12/11/18  8:22 AM

## 2019-01-10 ENCOUNTER — OFFICE VISIT (OUTPATIENT)
Dept: ORTHOPEDIC SURGERY | Facility: CLINIC | Age: 69
End: 2019-01-10

## 2019-01-10 DIAGNOSIS — Z98.890 S/P ORIF (OPEN REDUCTION INTERNAL FIXATION) FRACTURE: Primary | ICD-10-CM

## 2019-01-10 DIAGNOSIS — Z87.81 S/P ORIF (OPEN REDUCTION INTERNAL FIXATION) FRACTURE: Primary | ICD-10-CM

## 2019-01-10 DIAGNOSIS — S52.571D OTHER INTRAARTICULAR FRACTURE OF LOWER END OF RIGHT RADIUS, SUBSEQUENT ENCOUNTER FOR CLOSED FRACTURE WITH ROUTINE HEALING: ICD-10-CM

## 2019-01-10 PROCEDURE — 99024 POSTOP FOLLOW-UP VISIT: CPT | Performed by: PHYSICIAN ASSISTANT

## 2019-01-10 NOTE — PROGRESS NOTES
[facility] – [place] Orthopaedic [facility] Clinic Note    Subjective     Follow-up of the Right Wrist ((8 weeks status post Right Wrist Intraarticular Distal Radius Fracture Open Reduction Internal Fixation 11/14/18))       MT Mir is a 68 y.o. female.  Patient returns today approximately 8 weeks status post open reduction internal fixation right distal radius fracture performed on the above date by Dr. Parks.  She has no complaints or issues.  She denies any pain, catching or popping to the wrist.  She's been using her cane on that side without any issue.  She denies any numbness or tingling into the extremity.  No reported fever, chills, night sweats or other constitutional symptoms.         Objective      Physical Exam  There were no vitals taken for this visit.    There is no height or weight on file to calculate BMI.      Ortho Exam    Right Upper Extremity:          Musculoskeletal                 Inspection and Palpation:                  Hand/Wrist:                              Tenderness - none                          Swelling - none                                 ROM:  Slightly diminished but within normal limits                          Motor: Grossly intact radial, ulnar, median, AIN, PIN                          Sensory: Grossly intact to light touch radial, ulnar, median nerve distributions                          Vascular: 2+ radial pulse with brisk capillary refill noted and each digit.                                        Incision:  Well healed incision without redness, warmth, drainage or evidence of infection.        Imaging:  Ordered plain film imaging of the right wrist.  Images reviewed by Dr. Parks. Continued healing noted to the distal radius.  Hardware is intact.  Joint spaces preserved.  Alignment remains acceptable.  See chart for official report.      Assessment:  1. S/P ORIF (open reduction internal fixation) fracture    2. Other intraarticular fracture of lower end of  right radius, subsequent encounter for closed fracture with routine healing        Plan:  1. Patient may continue advancing activity as tolerated.    2. If it any time she notes any pain, locking, catching, etc. she needs to return to clinic for repeat evaluation.    3. At this time she'll be discharged from the orthopedic clinic with follow-up as necessary.    4. Question and concerns answered.        Tanya Lipscomb PA-C  01/10/19  2:17 PM

## 2019-08-21 ENCOUNTER — OFFICE VISIT (OUTPATIENT)
Dept: NEUROLOGY | Facility: CLINIC | Age: 69
End: 2019-08-21

## 2019-08-21 ENCOUNTER — LAB (OUTPATIENT)
Dept: LAB | Facility: HOSPITAL | Age: 69
End: 2019-08-21

## 2019-08-21 VITALS
HEIGHT: 63 IN | DIASTOLIC BLOOD PRESSURE: 72 MMHG | HEART RATE: 84 BPM | RESPIRATION RATE: 16 BRPM | WEIGHT: 165 LBS | BODY MASS INDEX: 29.23 KG/M2 | OXYGEN SATURATION: 94 % | SYSTOLIC BLOOD PRESSURE: 108 MMHG

## 2019-08-21 DIAGNOSIS — F48.2 PBA (PSEUDOBULBAR AFFECT): ICD-10-CM

## 2019-08-21 DIAGNOSIS — F33.1 MODERATE EPISODE OF RECURRENT MAJOR DEPRESSIVE DISORDER (HCC): ICD-10-CM

## 2019-08-21 DIAGNOSIS — G35 MULTIPLE SCLEROSIS (HCC): Primary | ICD-10-CM

## 2019-08-21 DIAGNOSIS — G40.109 SEIZURE, TEMPORAL LOBE (HCC): ICD-10-CM

## 2019-08-21 DIAGNOSIS — G35 MULTIPLE SCLEROSIS (HCC): ICD-10-CM

## 2019-08-21 LAB
ALBUMIN SERPL-MCNC: 5.1 G/DL (ref 3.5–5.2)
ALBUMIN/GLOB SERPL: 2 G/DL
ALP SERPL-CCNC: 76 U/L (ref 39–117)
ALT SERPL W P-5'-P-CCNC: 11 U/L (ref 1–33)
ANION GAP SERPL CALCULATED.3IONS-SCNC: 8.8 MMOL/L (ref 5–15)
AST SERPL-CCNC: 14 U/L (ref 1–32)
BASOPHILS # BLD AUTO: 0.02 10*3/MM3 (ref 0–0.2)
BASOPHILS NFR BLD AUTO: 0.3 % (ref 0–1.5)
BILIRUB SERPL-MCNC: 0.2 MG/DL (ref 0.2–1.2)
BUN BLD-MCNC: 14 MG/DL (ref 8–23)
BUN/CREAT SERPL: 20.9 (ref 7–25)
CALCIUM SPEC-SCNC: 10.2 MG/DL (ref 8.6–10.5)
CHLORIDE SERPL-SCNC: 93 MMOL/L (ref 98–107)
CO2 SERPL-SCNC: 29.2 MMOL/L (ref 22–29)
CREAT BLD-MCNC: 0.67 MG/DL (ref 0.57–1)
DEPRECATED RDW RBC AUTO: 42.8 FL (ref 37–54)
EOSINOPHIL # BLD AUTO: 0.06 10*3/MM3 (ref 0–0.4)
EOSINOPHIL NFR BLD AUTO: 0.9 % (ref 0.3–6.2)
ERYTHROCYTE [DISTWIDTH] IN BLOOD BY AUTOMATED COUNT: 12.9 % (ref 12.3–15.4)
GFR SERPL CREATININE-BSD FRML MDRD: 87 ML/MIN/1.73
GLOBULIN UR ELPH-MCNC: 2.5 GM/DL
GLUCOSE BLD-MCNC: 98 MG/DL (ref 65–99)
HCT VFR BLD AUTO: 42.6 % (ref 34–46.6)
HGB BLD-MCNC: 13.7 G/DL (ref 12–15.9)
IMM GRANULOCYTES # BLD AUTO: 0.02 10*3/MM3 (ref 0–0.05)
IMM GRANULOCYTES NFR BLD AUTO: 0.3 % (ref 0–0.5)
LYMPHOCYTES # BLD AUTO: 0.69 10*3/MM3 (ref 0.7–3.1)
LYMPHOCYTES NFR BLD AUTO: 10.9 % (ref 19.6–45.3)
MCH RBC QN AUTO: 29.5 PG (ref 26.6–33)
MCHC RBC AUTO-ENTMCNC: 32.2 G/DL (ref 31.5–35.7)
MCV RBC AUTO: 91.8 FL (ref 79–97)
MONOCYTES # BLD AUTO: 0.42 10*3/MM3 (ref 0.1–0.9)
MONOCYTES NFR BLD AUTO: 6.6 % (ref 5–12)
NEUTROPHILS # BLD AUTO: 5.14 10*3/MM3 (ref 1.7–7)
NEUTROPHILS NFR BLD AUTO: 81 % (ref 42.7–76)
NRBC BLD AUTO-RTO: 0 /100 WBC (ref 0–0.2)
PLATELET # BLD AUTO: 242 10*3/MM3 (ref 140–450)
PMV BLD AUTO: 9.8 FL (ref 6–12)
POTASSIUM BLD-SCNC: 5.2 MMOL/L (ref 3.5–5.2)
PROT SERPL-MCNC: 7.6 G/DL (ref 6–8.5)
RBC # BLD AUTO: 4.64 10*6/MM3 (ref 3.77–5.28)
SODIUM BLD-SCNC: 131 MMOL/L (ref 136–145)
WBC NRBC COR # BLD: 6.35 10*3/MM3 (ref 3.4–10.8)

## 2019-08-21 PROCEDURE — 36415 COLL VENOUS BLD VENIPUNCTURE: CPT

## 2019-08-21 PROCEDURE — 80053 COMPREHEN METABOLIC PANEL: CPT

## 2019-08-21 PROCEDURE — 85025 COMPLETE CBC W/AUTO DIFF WBC: CPT

## 2019-08-21 PROCEDURE — 99205 OFFICE O/P NEW HI 60 MIN: CPT | Performed by: PSYCHIATRY & NEUROLOGY

## 2019-08-21 RX ORDER — LORATADINE 10 MG/1
10 TABLET ORAL DAILY
COMMUNITY
End: 2022-06-21

## 2019-08-21 RX ORDER — DIMETHYL FUMARATE 240 MG/1
CAPSULE ORAL
COMMUNITY
End: 2019-10-02

## 2019-08-21 RX ORDER — ASPIRIN 81 MG/1
81 TABLET, CHEWABLE ORAL DAILY
COMMUNITY
End: 2022-12-12

## 2019-08-21 RX ORDER — METHYLPHENIDATE HYDROCHLORIDE 10 MG/1
10 TABLET ORAL 2 TIMES DAILY
COMMUNITY
End: 2020-12-09

## 2019-08-21 RX ORDER — BUPROPION HYDROCHLORIDE 300 MG/1
300 TABLET ORAL DAILY
COMMUNITY
End: 2019-08-21

## 2019-08-21 RX ORDER — FUROSEMIDE 20 MG/1
20 TABLET ORAL 2 TIMES DAILY
COMMUNITY
End: 2019-11-05
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
## 2019-08-27 ENCOUNTER — SPECIALTY PHARMACY (OUTPATIENT)
Dept: ONCOLOGY | Facility: HOSPITAL | Age: 69
End: 2019-08-27

## 2019-09-18 ENCOUNTER — HOSPITAL ENCOUNTER (OUTPATIENT)
Dept: MRI IMAGING | Facility: HOSPITAL | Age: 69
Discharge: HOME | End: 2019-09-18
Admitting: PSYCHIATRY & NEUROLOGY

## 2019-09-18 ENCOUNTER — APPOINTMENT (OUTPATIENT)
Dept: MRI IMAGING | Facility: HOSPITAL | Age: 69
End: 2019-09-18

## 2019-09-18 ENCOUNTER — HOSPITAL ENCOUNTER (OUTPATIENT)
Dept: MRI IMAGING | Facility: HOSPITAL | Age: 69
Discharge: HOME | End: 2019-09-18

## 2019-09-18 DIAGNOSIS — G35 MULTIPLE SCLEROSIS (HCC): ICD-10-CM

## 2019-09-18 PROCEDURE — 70553 MRI BRAIN STEM W/O & W/DYE: CPT

## 2019-09-18 PROCEDURE — 0 GADOBENATE DIMEGLUMINE 529 MG/ML SOLUTION: Performed by: PSYCHIATRY & NEUROLOGY

## 2019-09-18 PROCEDURE — A9577 INJ MULTIHANCE: HCPCS | Performed by: PSYCHIATRY & NEUROLOGY

## 2019-09-18 PROCEDURE — 72156 MRI NECK SPINE W/O & W/DYE: CPT

## 2019-09-18 RX ADMIN — GADOBENATE DIMEGLUMINE 15 ML: 529 INJECTION, SOLUTION INTRAVENOUS at 10:21

## 2019-09-23 ENCOUNTER — SPECIALTY PHARMACY (OUTPATIENT)
Dept: PHARMACY | Facility: HOSPITAL | Age: 69
End: 2019-09-23

## 2019-09-23 NOTE — PROGRESS NOTES
Oral Neurology Medication Teaching        Patient Name/:     Odalis Mir   1950  Oral Neurology Medication Regimen:  Nuedexta 20mg/10mg 1 capsule PO BID  Date Started Medication: 2019           Initial Teaching Follow Up Comments      Safety      Storage instructions (away from children; away from heat/cold, sunlight, or moisture)       “How are you storing your medications?”, reminders on storage, proper handling (away from children, managing waste, etc.), disposal of medication with D/C or dosage change     Patient counseled on appropriate storage of medication. Store at room temp, away from pets and children. Pt verbalized understanding.       Adherence       patient and/or caregiver on how to take medication, take with/without food, assess their adherence potential, stress importance of adherence, ways to manage adherence (pill boxes, phone reminders, calendars), what to do if miss a dose    “How are you taking your medication?” “How are you remembering to take your medication?”, “How many doses have you missed?”, determine reasons for non-adherence (not remembering, side effects, etc), ways to improve, overadherence? Remind patient of ways to improve/maintain adherence    Reviewed plan for Nuedexta 20mg/10mg (1 capsule) by mouth twice a day (doses should be 12 hours apart). Reviewed plan for missed doses. Pt voiced understanding. Discussed importance of compliance. Script processed at [facility] and will be shipped to pt.      Side Effects/Adverse Reactions       patient on potential side effects, s/s, ways to manage, when to call MD/seek help       Determine if patient experiencing side effects, ways to manage  Discussed potential side effects including but not limited to: diarrhea, vomiting, peripheral edema, asthenia, dizziness and cough. Discussed potential serious side effects of QT prolongation and hepatitis.  Pt verbalized understanding.      Miscellaneous      Food  interactions, DDIs, financial issues Determine if patient started any new medications (analyze for DDI) Patient advised to avoid grapefruit, grapefruit juice, alcohol and tonic water. No DDIs identified with Nuedexta.      Additional Notes: Discussed aforementioned material with patient by phone. All questions and concerns addressed. Patient provided with my contact information and instructed to call if additional questions should arise.

## 2019-09-24 ENCOUNTER — OFFICE VISIT (OUTPATIENT)
Dept: NEUROLOGY | Facility: CLINIC | Age: 69
End: 2019-09-24

## 2019-09-24 VITALS
SYSTOLIC BLOOD PRESSURE: 132 MMHG | DIASTOLIC BLOOD PRESSURE: 88 MMHG | WEIGHT: 164 LBS | HEART RATE: 85 BPM | HEIGHT: 63 IN | BODY MASS INDEX: 29.06 KG/M2 | RESPIRATION RATE: 16 BRPM | OXYGEN SATURATION: 94 %

## 2019-09-24 DIAGNOSIS — F48.2 PBA (PSEUDOBULBAR AFFECT): ICD-10-CM

## 2019-09-24 DIAGNOSIS — G35 MULTIPLE SCLEROSIS (HCC): Primary | ICD-10-CM

## 2019-09-24 DIAGNOSIS — G40.109 SEIZURE, TEMPORAL LOBE (HCC): ICD-10-CM

## 2019-09-24 DIAGNOSIS — F33.1 MODERATE EPISODE OF RECURRENT MAJOR DEPRESSIVE DISORDER (HCC): ICD-10-CM

## 2019-09-24 PROCEDURE — 99214 OFFICE O/P EST MOD 30 MIN: CPT | Performed by: PSYCHIATRY & NEUROLOGY
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
## 2019-09-24 NOTE — ASSESSMENT & PLAN NOTE
MRI Brain and cervical spine reviewed no new/enhancing/enlarging lesions    Continue Tecfidera    Stop Elavil over next week

## 2019-09-30 ENCOUNTER — SPECIALTY PHARMACY (OUTPATIENT)
Dept: PHARMACY | Facility: HOSPITAL | Age: 69
End: 2019-09-30

## 2019-09-30 NOTE — PROGRESS NOTES
Oral Neurology Medication Teaching        Patient Name/:     Odalis Mir   1950  Oral Neurology Medication Regimen:  Nuedexta 20mg/10mg 1 capsule PO BID  Date Started Medication: 2019           Initial Teaching Follow Up Comments      Safety      Storage instructions (away from children; away from heat/cold, sunlight, or moisture)       “How are you storing your medications?”, reminders on storage, proper handling (away from children, managing waste, etc.), disposal of medication with D/C or dosage change     Patient reports appropriate storage and handling.      Adherence       patient and/or caregiver on how to take medication, take with/without food, assess their adherence potential, stress importance of adherence, ways to manage adherence (pill boxes, phone reminders, calendars), what to do if miss a dose    “How are you taking your medication?” “How are you remembering to take your medication?”, “How many doses have you missed?”, determine reasons for non-adherence (not remembering, side effects, etc), ways to improve, overadherence? Remind patient of ways to improve/maintain adherence    Confirmed dose of Nuedexta 20mg/10mg (1 capsule) by mouth twice a day (doses should be 12 hours apart). Script processed at [facility] and will be shipped to pt.      Side Effects/Adverse Reactions       patient on potential side effects, s/s, ways to manage, when to call MD/seek help       Determine if patient experiencing side effects, ways to manage  Patient denies adverse effects.      Miscellaneous      Food interactions, DDIs, financial issues Determine if patient started any new medications (analyze for DDI)       Additional Notes: Discussed aforementioned material with patient by phone on 2019 and requested refill be submitted today. All questions and concerns addressed. Patient provided with my contact information and instructed to call if additional questions should arise.  Notified [facility] of refill request.

## 2019-10-02 ENCOUNTER — SPECIALTY PHARMACY (OUTPATIENT)
Dept: ONCOLOGY | Facility: HOSPITAL | Age: 69
End: 2019-10-02

## 2019-10-02 ENCOUNTER — SPECIALTY PHARMACY (OUTPATIENT)
Dept: PHARMACY | Facility: HOSPITAL | Age: 69
End: 2019-10-02

## 2019-10-02 RX ORDER — DIMETHYL FUMARATE 240 MG/1
240 CAPSULE ORAL 2 TIMES DAILY
Qty: 60 CAPSULE | Refills: 11 | Status: SHIPPED | OUTPATIENT
Start: 2019-10-02 | End: 2019-10-02 | Stop reason: SDUPTHER

## 2019-10-02 RX ORDER — DIMETHYL FUMARATE 240 MG/1
240 CAPSULE ORAL 2 TIMES DAILY
Qty: 60 CAPSULE | Refills: 11 | Status: SHIPPED | OUTPATIENT
Start: 2019-10-02 | End: 2020-09-23 | Stop reason: SDUPTHER

## 2019-10-09 ENCOUNTER — HOSPITAL ENCOUNTER (EMERGENCY)
Facility: HOSPITAL | Age: 69
Discharge: HOME | End: 2019-10-09
Attending: EMERGENCY MEDICINE | Admitting: EMERGENCY MEDICINE

## 2019-10-09 ENCOUNTER — APPOINTMENT (OUTPATIENT)
Dept: GENERAL RADIOLOGY | Facility: HOSPITAL | Age: 69
End: 2019-10-09

## 2019-10-09 VITALS
TEMPERATURE: 98.6 F | HEIGHT: 63 IN | RESPIRATION RATE: 20 BRPM | SYSTOLIC BLOOD PRESSURE: 186 MMHG | WEIGHT: 162 LBS | HEART RATE: 75 BPM | BODY MASS INDEX: 28.7 KG/M2 | DIASTOLIC BLOOD PRESSURE: 102 MMHG | OXYGEN SATURATION: 98 %

## 2019-10-09 DIAGNOSIS — J06.9 UPPER RESPIRATORY TRACT INFECTION, UNSPECIFIED TYPE: ICD-10-CM

## 2019-10-09 DIAGNOSIS — E86.0 DEHYDRATION: Primary | ICD-10-CM

## 2019-10-09 DIAGNOSIS — R03.0 ELEVATED BLOOD PRESSURE READING: ICD-10-CM

## 2019-10-09 DIAGNOSIS — Z87.09 HISTORY OF COPD: ICD-10-CM

## 2019-10-09 DIAGNOSIS — E87.6 HYPOKALEMIA: ICD-10-CM

## 2019-10-09 LAB
ALBUMIN SERPL-MCNC: 5.2 G/DL (ref 3.5–5.2)
ALBUMIN/GLOB SERPL: 2 G/DL
ALP SERPL-CCNC: 77 U/L (ref 39–117)
ALT SERPL W P-5'-P-CCNC: 24 U/L (ref 1–33)
ANION GAP SERPL CALCULATED.3IONS-SCNC: 14 MMOL/L (ref 5–15)
AST SERPL-CCNC: 24 U/L (ref 1–32)
BASOPHILS # BLD AUTO: 0.02 10*3/MM3 (ref 0–0.2)
BASOPHILS NFR BLD AUTO: 0.3 % (ref 0–1.5)
BILIRUB SERPL-MCNC: 0.4 MG/DL (ref 0.2–1.2)
BILIRUB UR QL STRIP: NEGATIVE
BUN BLD-MCNC: 6 MG/DL (ref 8–23)
BUN/CREAT SERPL: 12 (ref 7–25)
CALCIUM SPEC-SCNC: 9.6 MG/DL (ref 8.6–10.5)
CHLORIDE SERPL-SCNC: 86 MMOL/L (ref 98–107)
CLARITY UR: CLEAR
CO2 SERPL-SCNC: 28 MMOL/L (ref 22–29)
COLOR UR: YELLOW
CREAT BLD-MCNC: 0.5 MG/DL (ref 0.57–1)
DEPRECATED RDW RBC AUTO: 37.2 FL (ref 37–54)
EOSINOPHIL # BLD AUTO: 0.02 10*3/MM3 (ref 0–0.4)
EOSINOPHIL NFR BLD AUTO: 0.3 % (ref 0.3–6.2)
ERYTHROCYTE [DISTWIDTH] IN BLOOD BY AUTOMATED COUNT: 12.3 % (ref 12.3–15.4)
GFR SERPL CREATININE-BSD FRML MDRD: 122 ML/MIN/1.73
GLOBULIN UR ELPH-MCNC: 2.6 GM/DL
GLUCOSE BLD-MCNC: 123 MG/DL (ref 65–99)
GLUCOSE UR STRIP-MCNC: NEGATIVE MG/DL
HBA1C MFR BLD: 6.1 % (ref 4.8–5.6)
HCT VFR BLD AUTO: 38.9 % (ref 34–46.6)
HGB BLD-MCNC: 13.5 G/DL (ref 12–15.9)
HGB UR QL STRIP.AUTO: NEGATIVE
HOLD SPECIMEN: NORMAL
HOLD SPECIMEN: NORMAL
IMM GRANULOCYTES # BLD AUTO: 0.02 10*3/MM3 (ref 0–0.05)
IMM GRANULOCYTES NFR BLD AUTO: 0.3 % (ref 0–0.5)
KETONES UR QL STRIP: ABNORMAL
LEUKOCYTE ESTERASE UR QL STRIP.AUTO: NEGATIVE
LYMPHOCYTES # BLD AUTO: 0.61 10*3/MM3 (ref 0.7–3.1)
LYMPHOCYTES NFR BLD AUTO: 9.6 % (ref 19.6–45.3)
MAGNESIUM SERPL-MCNC: 1.9 MG/DL (ref 1.6–2.4)
MCH RBC QN AUTO: 29.3 PG (ref 26.6–33)
MCHC RBC AUTO-ENTMCNC: 34.7 G/DL (ref 31.5–35.7)
MCV RBC AUTO: 84.4 FL (ref 79–97)
MONOCYTES # BLD AUTO: 0.45 10*3/MM3 (ref 0.1–0.9)
MONOCYTES NFR BLD AUTO: 7.1 % (ref 5–12)
NEUTROPHILS # BLD AUTO: 5.23 10*3/MM3 (ref 1.7–7)
NEUTROPHILS NFR BLD AUTO: 82.4 % (ref 42.7–76)
NITRITE UR QL STRIP: NEGATIVE
NRBC BLD AUTO-RTO: 0 /100 WBC (ref 0–0.2)
PH UR STRIP.AUTO: 7 [PH] (ref 5–8)
PLATELET # BLD AUTO: 288 10*3/MM3 (ref 140–450)
PMV BLD AUTO: 9 FL (ref 6–12)
POTASSIUM BLD-SCNC: 3.4 MMOL/L (ref 3.5–5.2)
PROT SERPL-MCNC: 7.8 G/DL (ref 6–8.5)
PROT UR QL STRIP: NEGATIVE
RBC # BLD AUTO: 4.61 10*6/MM3 (ref 3.77–5.28)
SODIUM BLD-SCNC: 128 MMOL/L (ref 136–145)
SP GR UR STRIP: 1.01 (ref 1–1.03)
TROPONIN T SERPL-MCNC: <0.01 NG/ML (ref 0–0.03)
UROBILINOGEN UR QL STRIP: ABNORMAL
WBC NRBC COR # BLD: 6.35 10*3/MM3 (ref 3.4–10.8)
WHOLE BLOOD HOLD SPECIMEN: NORMAL
WHOLE BLOOD HOLD SPECIMEN: NORMAL

## 2019-10-09 PROCEDURE — 83036 HEMOGLOBIN GLYCOSYLATED A1C: CPT | Performed by: PHYSICIAN ASSISTANT

## 2019-10-09 PROCEDURE — 83735 ASSAY OF MAGNESIUM: CPT

## 2019-10-09 PROCEDURE — 99284 EMERGENCY DEPT VISIT MOD MDM: CPT

## 2019-10-09 PROCEDURE — 80053 COMPREHEN METABOLIC PANEL: CPT

## 2019-10-09 PROCEDURE — 81003 URINALYSIS AUTO W/O SCOPE: CPT | Performed by: EMERGENCY MEDICINE

## 2019-10-09 PROCEDURE — 84484 ASSAY OF TROPONIN QUANT: CPT

## 2019-10-09 PROCEDURE — 93005 ELECTROCARDIOGRAM TRACING: CPT

## 2019-10-09 PROCEDURE — 71045 X-RAY EXAM CHEST 1 VIEW: CPT

## 2019-10-09 PROCEDURE — 93005 ELECTROCARDIOGRAM TRACING: CPT | Performed by: EMERGENCY MEDICINE

## 2019-10-09 PROCEDURE — 85025 COMPLETE CBC W/AUTO DIFF WBC: CPT

## 2019-10-09 RX ORDER — SODIUM CHLORIDE 0.9 % (FLUSH) 0.9 %
10 SYRINGE (ML) INJECTION AS NEEDED
Status: DISCONTINUED | OUTPATIENT
Start: 2019-10-09 | End: 2019-10-09 | Stop reason: HOSPADM

## 2019-10-09 RX ORDER — AZITHROMYCIN 250 MG/1
250 TABLET, FILM COATED ORAL DAILY
Qty: 6 TABLET | Refills: 0 | Status: SHIPPED | OUTPATIENT
Start: 2019-10-09 | End: 2020-12-09

## 2019-10-09 RX ADMIN — SODIUM CHLORIDE 1000 ML: 9 INJECTION, SOLUTION INTRAVENOUS at 18:57

## 2019-10-09 NOTE — ED PROVIDER NOTES
Subjective   Odalis Mir is a 69 y.o. female who presents to the ED c/o generalized weakness. For the past week patient reports not feeling well. She originally attributed her symptoms to allergies but the continuing and worsening fatigue and weakness led her to negate the possibility of allergies flare-up. She has had diarrhea since onset but states it has resolved in the past 2 days. Patient complains of nausea, chills and hot flashes, vomiting, non-productive cough except for one episode last week, lightheadedness and dizziness upon stnading up, and decreased appetite and fluid intake but denies fever, abdominal pain, chest pain, rhinorrhea, sore throat, flank pain, and abnormal urinary symptoms. Patient reports she consumes ample amounts of water and stays hydrated regularly. She has not contacted her primary care provider's, Dr. Bailey, since the onset of her current episode. Patient denies a past medical history of diabetes mellitus but has a family history of dementia and Alzheimer's disease. She does not use tobacco, alcohol, or narcotics. There are no other acute complaints at this time.        History provided by:  Patient  Weakness - Generalized   Severity:  Moderate  Onset quality:  Sudden  Duration:  1 week  Timing:  Constant  Progression:  Worsening  Chronicity:  New  Context: not allergies (originally thought allergies was the reason but not the case)    Relieved by:  None tried  Worsened by:  Nothing  Ineffective treatments:  None tried  Associated symptoms: cough (non productive except for 1 episode last week), diarrhea (resolved 2 days ago), dizziness (upon standing up), nausea and vomiting    Associated symptoms: no abdominal pain, no chest pain, no dysuria, no fever, no frequency, no loss of consciousness, no melena, no near-syncope and no urgency    Risk factors: no congestive heart failure, no coronary artery disease, no diabetes and no family hx of stroke        Review of Systems    Constitutional: Positive for appetite change (decreased appetite and fluid intake), chills and fatigue. Negative for fever.   HENT: Negative for rhinorrhea and sore throat.    Respiratory: Positive for cough (non productive except for 1 episode last week).    Cardiovascular: Negative for chest pain and near-syncope.   Gastrointestinal: Positive for diarrhea (resolved 2 days ago), nausea and vomiting. Negative for abdominal pain and melena.        No flank pain   Genitourinary: Negative for decreased urine volume, difficulty urinating, dysuria, frequency, hematuria and urgency.   Neurological: Positive for dizziness (upon standing up), weakness and light-headedness (upon standing up). Negative for loss of consciousness.   All other systems reviewed and are negative.      Past Medical History:   Diagnosis Date   • Anxiety and depression    • Elevated cholesterol    • GERD (gastroesophageal reflux disease)    • Hypertension    • Multiple sclerosis (CMS/HCC)    • Osteoarthritis    • Seizure (CMS/HCC)        Allergies   Allergen Reactions   • Shellfish-Derived Products Anaphylaxis   • Sulfa Antibiotics Anaphylaxis       Past Surgical History:   Procedure Laterality Date   • APPENDECTOMY     • COLONOSCOPY     • EYE SURGERY Right     Bleeding behind the retina    • HAND SURGERY Right     thumb   • HYSTERECTOMY     • ORIF WRIST FRACTURE Right 11/14/2018    Procedure: RIGHT WRIST INTRA  ARTICULAR  DISTAL RADIUS FRACTURE OPEN REDUCTION INTERNAL FIXATION;  Surgeon: El Parks MD;  Location: [facility];  Service: Orthopedics       Family History   Problem Relation Age of Onset   • COPD Mother    • Hypertension Father    • Alzheimer's disease Father    • Dementia Father    • Cancer Paternal Grandmother        Social History     Socioeconomic History   • Marital status: Single     Spouse name: Not on file   • Number of children: Not on file   • Years of education: Not on file   • Highest education level: Not on  file   Tobacco Use   • Smoking status: Never Smoker   • Smokeless tobacco: Never Used   Substance and Sexual Activity   • Alcohol use: No   • Drug use: No   • Sexual activity: Defer         Objective   Physical Exam   Constitutional: She is oriented to person, place, and time. She appears well-developed and well-nourished. No distress.   Patient is non-toxic appearing and afebrile.  She does not appear to be in any acute distress.   HENT:   Head: Normocephalic and atraumatic.   Eyes: Conjunctivae are normal. No scleral icterus.   Neck: Normal range of motion. Neck supple.   Cardiovascular: Normal rate, regular rhythm and normal heart sounds.   No murmur heard.  Pulmonary/Chest: Effort normal and breath sounds normal. No respiratory distress.   Abdominal: Soft. There is no tenderness.   Musculoskeletal: Normal range of motion. She exhibits no edema.   Neurological: She is alert and oriented to person, place, and time.   Skin: Skin is warm and dry.   Psychiatric: She has a normal mood and affect. Her behavior is normal.   Nursing note and vitals reviewed.      Procedures         ED Course  ED Course as of Oct 10 0005   Wed Oct 09, 2019   1758 Anion Gap: 14.0 [TG]   1758 Glucose: (!) 123 [TG]      ED Course User Index  [TG] Sebastián Haines PA-C       Recent Results (from the past 24 hour(s))   Comprehensive Metabolic Panel    Collection Time: 10/09/19  4:04 PM   Result Value Ref Range    Glucose 123 (H) 65 - 99 mg/dL    BUN 6 (L) 8 - 23 mg/dL    Creatinine 0.50 (L) 0.57 - 1.00 mg/dL    Sodium 128 (L) 136 - 145 mmol/L    Potassium 3.4 (L) 3.5 - 5.2 mmol/L    Chloride 86 (L) 98 - 107 mmol/L    CO2 28.0 22.0 - 29.0 mmol/L    Calcium 9.6 8.6 - 10.5 mg/dL    Total Protein 7.8 6.0 - 8.5 g/dL    Albumin 5.20 3.50 - 5.20 g/dL    ALT (SGPT) 24 1 - 33 U/L    AST (SGOT) 24 1 - 32 U/L    Alkaline Phosphatase 77 39 - 117 U/L    Total Bilirubin 0.4 0.2 - 1.2 mg/dL    eGFR Non African Amer 122 >60 mL/min/1.73    Globulin 2.6  gm/dL    A/G Ratio 2.0 g/dL    BUN/Creatinine Ratio 12.0 7.0 - 25.0    Anion Gap 14.0 5.0 - 15.0 mmol/L   Troponin    Collection Time: 10/09/19  4:04 PM   Result Value Ref Range    Troponin T <0.010 0.000 - 0.030 ng/mL   Magnesium    Collection Time: 10/09/19  4:04 PM   Result Value Ref Range    Magnesium 1.9 1.6 - 2.4 mg/dL   Light Blue Top    Collection Time: 10/09/19  4:04 PM   Result Value Ref Range    Extra Tube hold for add-on    Green Top (Gel)    Collection Time: 10/09/19  4:04 PM   Result Value Ref Range    Extra Tube Hold for add-ons.    Lavender Top    Collection Time: 10/09/19  4:04 PM   Result Value Ref Range    Extra Tube hold for add-on    Gold Top - SST    Collection Time: 10/09/19  4:04 PM   Result Value Ref Range    Extra Tube Hold for add-ons.    CBC Auto Differential    Collection Time: 10/09/19  4:04 PM   Result Value Ref Range    WBC 6.35 3.40 - 10.80 10*3/mm3    RBC 4.61 3.77 - 5.28 10*6/mm3    Hemoglobin 13.5 12.0 - 15.9 g/dL    Hematocrit 38.9 34.0 - 46.6 %    MCV 84.4 79.0 - 97.0 fL    MCH 29.3 26.6 - 33.0 pg    MCHC 34.7 31.5 - 35.7 g/dL    RDW 12.3 12.3 - 15.4 %    RDW-SD 37.2 37.0 - 54.0 fl    MPV 9.0 6.0 - 12.0 fL    Platelets 288 140 - 450 10*3/mm3    Neutrophil % 82.4 (H) 42.7 - 76.0 %    Lymphocyte % 9.6 (L) 19.6 - 45.3 %    Monocyte % 7.1 5.0 - 12.0 %    Eosinophil % 0.3 0.3 - 6.2 %    Basophil % 0.3 0.0 - 1.5 %    Immature Grans % 0.3 0.0 - 0.5 %    Neutrophils, Absolute 5.23 1.70 - 7.00 10*3/mm3    Lymphocytes, Absolute 0.61 (L) 0.70 - 3.10 10*3/mm3    Monocytes, Absolute 0.45 0.10 - 0.90 10*3/mm3    Eosinophils, Absolute 0.02 0.00 - 0.40 10*3/mm3    Basophils, Absolute 0.02 0.00 - 0.20 10*3/mm3    Immature Grans, Absolute 0.02 0.00 - 0.05 10*3/mm3    nRBC 0.0 0.0 - 0.2 /100 WBC   Hemoglobin A1c    Collection Time: 10/09/19  4:04 PM   Result Value Ref Range    Hemoglobin A1C 6.10 (H) 4.80 - 5.60 %   Urinalysis With Microscopic If Indicated (No Culture) - Urine, Clean Catch     Collection Time: 10/09/19  5:49 PM   Result Value Ref Range    Color, UA Yellow Yellow, Straw    Appearance, UA Clear Clear    pH, UA 7.0 5.0 - 8.0    Specific Gravity, UA 1.013 1.001 - 1.030    Glucose, UA Negative Negative    Ketones, UA 40 mg/dL (2+) (A) Negative    Bilirubin, UA Negative Negative    Blood, UA Negative Negative    Protein, UA Negative Negative    Leuk Esterase, UA Negative Negative    Nitrite, UA Negative Negative    Urobilinogen, UA 0.2 E.U./dL 0.2 - 1.0 E.U./dL     Note: In addition to lab results from this visit, the labs listed above may include labs taken at another facility or during a different encounter within the last 24 hours. Please correlate lab times with ED admission and discharge times for further clarification of the services performed during this visit.    XR Chest 1 View   Preliminary Result   Chronic appearing lung changes suggestive of COPD without   evidence for active air space disease.       D:  10/09/2019   E:  10/09/2019                Vitals:    10/09/19 1800 10/09/19 1830 10/09/19 1900 10/09/19 2000   BP: 170/90 169/88 170/85 (!) 186/102   BP Location:       Patient Position:       Pulse: 71 74 70 75   Resp:       Temp:       TempSrc:       SpO2: 98% 98% 97% 98%   Weight:       Height:         Medications   sodium chloride 0.9 % bolus 1,000 mL (0 mL Intravenous Stopped 10/9/19 1937)     ECG/EMG Results (last 24 hours)     Procedure Component Value Units Date/Time    ECG 12 Lead [[id number]] Collected:  10/09/19 1509     Updated:  10/09/19 1723        ECG 12 Lead                           MDM  Number of Diagnoses or Management Options  Dehydration: new and requires workup  Elevated blood pressure reading: new and requires workup  History of COPD: new and requires workup  Hypokalemia: new and requires workup  Upper respiratory tract infection, unspecified type: new and requires workup     Amount and/or Complexity of Data Reviewed  Tests in the radiology section of CPT®:  ordered and reviewed  Tests in the medicine section of CPT®: reviewed and ordered  Decide to obtain previous medical records or to obtain history from someone other than the patient: yes  Discuss the patient with other providers: yes    Risk of Complications, Morbidity, and/or Mortality  Presenting problems: high  Diagnostic procedures: moderate  Management options: high    Patient Progress  Patient progress: stable      Final diagnoses:   Dehydration   Upper respiratory tract infection, unspecified type   Hypokalemia   Elevated blood pressure reading   History of COPD       Documentation assistance provided by stefanie Young.  Information recorded by the stefanie was done at my direction and has been verified and validated by me.     Delonte Young  10/09/19 1804       Sebastián Haines PA-C  10/10/19 0005

## 2019-10-10 NOTE — DISCHARGE INSTRUCTIONS
Increase her dietary potassium content.  Increase your fluid intake as well.  Follow-up with your primary care provider tomorrow for recheck.  Return to the emergency department immediately if any change or worsening of symptoms.   Principal Discharge DX:	Palpitations

## 2019-10-28 ENCOUNTER — SPECIALTY PHARMACY (OUTPATIENT)
Dept: PHARMACY | Facility: HOSPITAL | Age: 69
End: 2019-10-28

## 2019-10-28 NOTE — PROGRESS NOTES
Oral Neurology Medication Teaching        Patient Name/:     Odalis Mir   1950  Oral Neurology Medication Regimen:  Nuedexta 20mg/10mg 1 capsule PO BID  Date Started Medication: 2019               Initial Teaching Follow Up Comments      Safety      Storage instructions (away from children; away from heat/cold, sunlight, or moisture)       “How are you storing your medications?”, reminders on storage, proper handling (away from children, managing waste, etc.), disposal of medication with D/C or dosage change     Patient reports appropriate storage and handling.      Adherence       patient and/or caregiver on how to take medication, take with/without food, assess their adherence potential, stress importance of adherence, ways to manage adherence (pill boxes, phone reminders, calendars), what to do if miss a dose    “How are you taking your medication?” “How are you remembering to take your medication?”, “How many doses have you missed?”, determine reasons for non-adherence (not remembering, side effects, etc), ways to improve, overadherence? Remind patient of ways to improve/maintain adherence    Confirmed dose of Nuedexta 20mg/10mg (1 capsule) by mouth twice a day (doses should be 12 hours apart). Script processed at [facility] [employer] [employer] and will be shipped to pt.      Side Effects/Adverse Reactions       patient on potential side effects, s/s, ways to manage, when to call MD/seek help       Determine if patient experiencing side effects, ways to manage  Patient denies adverse effects.      Miscellaneous      Food interactions, DDIs, financial issues Determine if patient started any new medications (analyze for DDI)        Additional Notes: Discussed aforementioned material with patient by phone on 10/28/2019 and requested refill be submitted today. All questions and concerns addressed. Patient provided with my contact information and instructed to call if additional questions should arise.  Notified [facility] of refill request.

## 2019-11-04 ENCOUNTER — HOSPITAL ENCOUNTER (EMERGENCY)
Facility: HOSPITAL | Age: 69
Discharge: HOME | End: 2019-11-04
Attending: EMERGENCY MEDICINE | Admitting: EMERGENCY MEDICINE

## 2019-11-04 ENCOUNTER — TELEPHONE (OUTPATIENT)
Dept: NEUROLOGY | Facility: CLINIC | Age: 69
End: 2019-11-04

## 2019-11-04 VITALS
SYSTOLIC BLOOD PRESSURE: 196 MMHG | BODY MASS INDEX: 27.66 KG/M2 | RESPIRATION RATE: 16 BRPM | HEART RATE: 78 BPM | TEMPERATURE: 98.3 F | OXYGEN SATURATION: 95 % | WEIGHT: 162 LBS | HEIGHT: 64 IN | DIASTOLIC BLOOD PRESSURE: 92 MMHG

## 2019-11-04 DIAGNOSIS — S61.011A LACERATION OF RIGHT THUMB WITHOUT FOREIGN BODY WITHOUT DAMAGE TO NAIL, INITIAL ENCOUNTER: Primary | ICD-10-CM

## 2019-11-04 PROCEDURE — 99282 EMERGENCY DEPT VISIT SF MDM: CPT

## 2019-11-04 PROCEDURE — 90471 IMMUNIZATION ADMIN: CPT

## 2019-11-04 RX ORDER — CARBAMAZEPINE 200 MG/1
TABLET ORAL
Qty: 150 TABLET | Refills: 11 | Status: SHIPPED | OUTPATIENT
Start: 2019-11-04 | End: 2019-11-05 | Stop reason: SDUPTHER

## 2019-11-04 RX ADMIN — DIPHTHERIA AND TETANUS TOXOIDS AND ACELLULAR PERTUSSIS VACCINE ADSORBED 0.5 ML: 10; 25; 25; 25; 8 SUSPENSION INTRAMUSCULAR at 18:43

## 2019-11-04 NOTE — ED PROVIDER NOTES
Subjective   Odalis Mir is a 69 y.o. female who presents to the ED with complaints of a right thumb injury around [id number] today. She states she accidentally cut herself with a brand new pill cutter when her hand slipped. Her thumb is currently wrapped and the bleeding is controlled. She denies any other injury, hand pain, or swelling. Her tetanus shot is out of date. She has a history of HTN, HLD, osteoarthritis, anxiety, and depression. Her past surgical history includes a right thumb surgery and right wrist ORIF fracture. Her PCP is Irene Bailey. There are no other acute complaints at this time.        History provided by:  Patient  Finger Injury   Location:  Right thumb  Quality:  Laceration  Severity:  Mild  Onset quality:  Sudden  Duration:  2 hours  Timing:  Constant  Progression:  Improving  Chronicity:  New  Context:  Pt cut her thumb on a brand new pill cutter  Relieved by:  None tried  Worsened by:  Nothing  Ineffective treatments:  None tried      Review of Systems   Musculoskeletal: Negative for arthralgias and joint swelling.   Skin: Positive for wound (right thumb).   Neurological: Negative for numbness.   All other systems reviewed and are negative.      Past Medical History:   Diagnosis Date   • Anxiety and depression    • Elevated cholesterol    • GERD (gastroesophageal reflux disease)    • Hypertension    • Multiple sclerosis (CMS/HCC)    • Osteoarthritis    • Seizure (CMS/HCC)        Allergies   Allergen Reactions   • Shellfish-Derived Products Anaphylaxis   • Sulfa Antibiotics Anaphylaxis       Past Surgical History:   Procedure Laterality Date   • APPENDECTOMY     • COLONOSCOPY     • EYE SURGERY Right     Bleeding behind the retina    • HAND SURGERY Right     thumb   • HYSTERECTOMY     • ORIF WRIST FRACTURE Right 11/14/2018    Procedure: RIGHT WRIST INTRA  ARTICULAR  DISTAL RADIUS FRACTURE OPEN REDUCTION INTERNAL FIXATION;  Surgeon: El Parks MD;  [facility];   Service: Orthopedics       Family History   Problem Relation Age of Onset   • COPD Mother    • Hypertension Father    • Alzheimer's disease Father    • Dementia Father    • Cancer Paternal Grandmother        Social History     Socioeconomic History   • Marital status: Single     Spouse name: Not on file   • Number of children: Not on file   • Years of education: Not on file   • Highest education level: Not on file   Tobacco Use   • Smoking status: Never Smoker   • Smokeless tobacco: Never Used   Substance and Sexual Activity   • Alcohol use: No   • Drug use: No   • Sexual activity: Defer         Objective   Physical Exam   Constitutional: She is oriented to person, place, and time. She appears well-developed and well-nourished.   HENT:   Head: Normocephalic and atraumatic.   Nose: Nose normal.   Eyes: Conjunctivae are normal. No scleral icterus.   Neck: Normal range of motion.   Cardiovascular: Normal rate, regular rhythm and intact distal pulses.   Pulmonary/Chest: Effort normal. No respiratory distress.   Musculoskeletal: Normal range of motion.   Neurological: She is alert and oriented to person, place, and time.   Skin: Skin is warm and dry. Capillary refill takes less than 2 seconds. Laceration noted.   Small 0.5 cm laceration to distal end of the right thumb. No active bleeding.   Psychiatric: She has a normal mood and affect. Her behavior is normal.   Nursing note and vitals reviewed.      Laceration Repair  Date/Time: 11/4/2019 6:24 PM  Performed by: Kelly Barakat APRN  Authorized by: Ramez Alexander MD     Consent:     Consent obtained:  Verbal    Consent given by:  Patient    Risks discussed:  Infection  Anesthesia (see MAR for exact dosages):     Anesthesia method:  None  Laceration details:     Location:  Finger    Finger location:  R thumb    Length (cm):  0.5  Repair type:     Repair type:  Simple  Pre-procedure details:     Preparation:  Patient was prepped and draped in usual sterile  "fashion  Exploration:     Hemostasis achieved with:  Direct pressure    Wound exploration: wound explored through full range of motion      Wound extent comment:  Superficial    Contaminated: no    Treatment:     Area cleansed with:  Saline and Betadine    Amount of cleaning:  Standard    Irrigation solution:  Sterile saline    Irrigation method:  Syringe    Visualized foreign bodies/material removed: no    Skin repair:     Repair method:  Tissue adhesive  Approximation:     Approximation:  Close  Post-procedure details:     Dressing:  Open (no dressing)    Patient tolerance of procedure:  Tolerated well, no immediate complications             ED Course      No results found for this or any previous visit (from the past 24 hour(s)).  Note: In addition to lab results from this visit, the labs listed above may include labs taken at another facility or during a different encounter within the last 24 hours. Please correlate lab times with ED admission and discharge times for further clarification of the services performed during this visit.    No orders to display     Vitals:    11/04/19 1559   BP: (!) 196/92   BP Location: Left arm   Patient Position: Sitting   Pulse: 78   Resp: 16   Temp: 98.3 °F (36.8 °C)   TempSrc: Oral   SpO2: 95%   Weight: 73.5 kg (162 lb)   Height: 162.6 cm (64\")     Medications   DTaP (INFANRIX) injection 0.5 mL (not administered)     ECG/EMG Results (last 24 hours)     ** No results found for the last 24 hours. **        No orders to display       Advised patient to keep wound clean and dry and to follow-up with primary care provider in the next 3 days or return to the ER with worsening of symptoms or development of signs of infection such as increased redness, increased swelling, red streaking, or development of puslike drainage and return immediately to the ER should any of these develop.  Patient verbalized understanding of all discussed                  MDM    Final diagnoses:   Laceration " of right thumb without foreign body without damage to nail, initial encounter       Documentation assistance provided by stefanie Lane.  Information recorded by the scribe was done at my direction and has been verified and validated by me.     Miranda Lane  11/04/19 1832       Kelly Barakat APRN  11/04/19 2247       Kelly Barakat APRN  11/04/19 2241

## 2019-11-04 NOTE — TELEPHONE ENCOUNTER
----- Message from Renata Ordaz sent at 11/4/2019 12:11 PM EST -----  Regarding: Carbamazapine  Patient just called requesting a prescription of Carbamazapine. Please send. Thanks!

## 2019-11-05 ENCOUNTER — OFFICE VISIT (OUTPATIENT)
Dept: NEUROLOGY | Facility: CLINIC | Age: 69
End: 2019-11-05

## 2019-11-05 VITALS
HEIGHT: 63 IN | BODY MASS INDEX: 28.88 KG/M2 | SYSTOLIC BLOOD PRESSURE: 150 MMHG | WEIGHT: 163 LBS | OXYGEN SATURATION: 93 % | DIASTOLIC BLOOD PRESSURE: 98 MMHG | HEART RATE: 93 BPM

## 2019-11-05 DIAGNOSIS — G40.109 SEIZURE, TEMPORAL LOBE (HCC): ICD-10-CM

## 2019-11-05 DIAGNOSIS — F48.2 PBA (PSEUDOBULBAR AFFECT): ICD-10-CM

## 2019-11-05 DIAGNOSIS — G35 MULTIPLE SCLEROSIS (HCC): Primary | ICD-10-CM

## 2019-11-05 DIAGNOSIS — F33.1 MODERATE EPISODE OF RECURRENT MAJOR DEPRESSIVE DISORDER (HCC): ICD-10-CM

## 2019-11-05 PROBLEM — R26.9 GAIT ABNORMALITY: Status: ACTIVE | Noted: 2019-11-05

## 2019-11-05 PROCEDURE — 99214 OFFICE O/P EST MOD 30 MIN: CPT | Performed by: PSYCHIATRY & NEUROLOGY

## 2019-11-05 RX ORDER — VALSARTAN AND HYDROCHLOROTHIAZIDE 160; 25 MG/1; MG/1
TABLET ORAL DAILY
Refills: 5 | COMMUNITY
Start: 2019-10-24 | End: 2020-06-03 | Stop reason: SDUPTHER

## 2019-11-05 RX ORDER — CARBAMAZEPINE 200 MG/1
TABLET ORAL
Qty: 90 TABLET | Refills: 11 | Status: SHIPPED | OUTPATIENT
Start: 2019-11-05 | End: 2020-09-28
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
## 2019-11-05 NOTE — ASSESSMENT & PLAN NOTE
Psychological condition is improving with treatment.  Continue current treatment regimen.  Psychological condition  will be reassessed at the next regular appointment.      Reduce Xanax 1 mg BID     Stop hydroxyzine

## 2019-12-05 ENCOUNTER — SPECIALTY PHARMACY (OUTPATIENT)
Dept: ONCOLOGY | Facility: HOSPITAL | Age: 69
End: 2019-12-05

## 2019-12-05 NOTE — PROGRESS NOTES
Oral Neurology Medication Teaching        Patient Name/:     Odalis Mir   1950  Oral Neurology Medication Regimen:  Nuedexta 20mg/10mg 1 capsule PO BID  Date Started Medication: 2019           Initial Teaching Follow Up Comments      Safety      Storage instructions (away from children; away from heat/cold, sunlight, or moisture)       “How are you storing your medications?”, reminders on storage, proper handling (away from children, managing waste, etc.), disposal of medication with D/C or dosage change     Patient reports appropriate storage and handling.      Adherence       patient and/or caregiver on how to take medication, take with/without food, assess their adherence potential, stress importance of adherence, ways to manage adherence (pill boxes, phone reminders, calendars), what to do if miss a dose    “How are you taking your medication?” “How are you remembering to take your medication?”, “How many doses have you missed?”, determine reasons for non-adherence (not remembering, side effects, etc), ways to improve, overadherence? Remind patient of ways to improve/maintain adherence    Confirmed dose of Nuedexta 20mg/10mg (1 capsule) by mouth twice a day (doses should be 12 hours apart). Script processed at [facility] and will be shipped to pt.      Side Effects/Adverse Reactions       patient on potential side effects, s/s, ways to manage, when to call MD/seek help       Determine if patient experiencing side effects, ways to manage  Patient denies adverse effects.      Miscellaneous      Food interactions, DDIs, financial issues Determine if patient started any new medications (analyze for DDI)       Additional Notes: Discussed aforementioned material with patient by phone on 2019 and requested refill be submitted today. All questions and concerns addressed. Patient provided with my contact information and instructed to call if additional questions should arise.  Notified [facility] retail of refill request.

## 2019-12-17 ENCOUNTER — OFFICE VISIT (OUTPATIENT)
Dept: NEUROLOGY | Facility: CLINIC | Age: 69
End: 2019-12-17

## 2019-12-17 VITALS
SYSTOLIC BLOOD PRESSURE: 114 MMHG | HEIGHT: 63 IN | HEART RATE: 86 BPM | BODY MASS INDEX: 28.87 KG/M2 | WEIGHT: 162.92 LBS | DIASTOLIC BLOOD PRESSURE: 66 MMHG | OXYGEN SATURATION: 94 %

## 2019-12-17 DIAGNOSIS — G35 MULTIPLE SCLEROSIS (HCC): Primary | ICD-10-CM

## 2019-12-17 DIAGNOSIS — F32.2 CURRENT SEVERE EPISODE OF MAJOR DEPRESSIVE DISORDER WITHOUT PSYCHOTIC FEATURES WITHOUT PRIOR EPISODE (HCC): ICD-10-CM

## 2019-12-17 DIAGNOSIS — F48.2 PBA (PSEUDOBULBAR AFFECT): ICD-10-CM

## 2019-12-17 DIAGNOSIS — R26.9 GAIT ABNORMALITY: ICD-10-CM

## 2019-12-17 DIAGNOSIS — F41.9 ANXIETY: ICD-10-CM

## 2019-12-17 DIAGNOSIS — F33.1 MODERATE EPISODE OF RECURRENT MAJOR DEPRESSIVE DISORDER (HCC): ICD-10-CM

## 2019-12-17 DIAGNOSIS — G40.109 SEIZURE, TEMPORAL LOBE (HCC): ICD-10-CM

## 2019-12-17 PROCEDURE — 99214 OFFICE O/P EST MOD 30 MIN: CPT | Performed by: NURSE PRACTITIONER
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
## 2019-12-17 NOTE — ASSESSMENT & PLAN NOTE
Nudexta no longer helpful, S/E of nausea    Referred to Psychiatry for depression, anxiety and PBA.

## 2019-12-27 ENCOUNTER — TELEPHONE (OUTPATIENT)
Dept: NEUROLOGY | Facility: CLINIC | Age: 69
End: 2019-12-27

## 2019-12-27 DIAGNOSIS — F33.1 MODERATE EPISODE OF RECURRENT MAJOR DEPRESSIVE DISORDER (HCC): Primary | ICD-10-CM

## 2019-12-27 NOTE — TELEPHONE ENCOUNTER
----- Message from Amanda Pulido sent at 12/27/2019 12:30 PM EST -----  Regarding: depression  Contact: [phone]  Patient states for the last several days she has become very depressed.

## 2019-12-30 RX ORDER — ARIPIPRAZOLE 5 MG/1
5 TABLET ORAL DAILY
Qty: 30 TABLET | Refills: 5 | Status: SHIPPED | OUTPATIENT
Start: 2019-12-30 | End: 2020-06-03 | Stop reason: SDUPTHER

## 2019-12-30 NOTE — TELEPHONE ENCOUNTER
Spoke with patient and she does not feel that she can re-start Nuedexta.  She was very nauseated and unable to eat while taking it last time.  Once she stopped the Nuedexta her symptoms improved.      She reports she continues to feel depressed.  Mornings she feels the worst and this only improves slightly throughout the day.    What else can you recommend for depression?

## 2019-12-30 NOTE — TELEPHONE ENCOUNTER
Voice message left for patient regarding new medication.  Instructed patient to call back if she has any questions.

## 2020-02-21 ENCOUNTER — LAB (OUTPATIENT)
Dept: LAB | Facility: HOSPITAL | Age: 70
End: 2020-02-21

## 2020-02-21 DIAGNOSIS — G35 MULTIPLE SCLEROSIS (HCC): ICD-10-CM

## 2020-02-21 LAB
ALBUMIN SERPL-MCNC: 4.9 G/DL (ref 3.5–5.2)
ALBUMIN/GLOB SERPL: 2 G/DL
ALP SERPL-CCNC: 60 U/L (ref 39–117)
ALT SERPL W P-5'-P-CCNC: 11 U/L (ref 1–33)
ANION GAP SERPL CALCULATED.3IONS-SCNC: 10.8 MMOL/L (ref 5–15)
AST SERPL-CCNC: 12 U/L (ref 1–32)
BASOPHILS # BLD AUTO: 0.03 10*3/MM3 (ref 0–0.2)
BASOPHILS NFR BLD AUTO: 0.6 % (ref 0–1.5)
BILIRUB SERPL-MCNC: 0.3 MG/DL (ref 0.2–1.2)
BUN BLD-MCNC: 16 MG/DL (ref 8–23)
BUN/CREAT SERPL: 25.8 (ref 7–25)
CALCIUM SPEC-SCNC: 9.9 MG/DL (ref 8.6–10.5)
CHLORIDE SERPL-SCNC: 92 MMOL/L (ref 98–107)
CO2 SERPL-SCNC: 30.2 MMOL/L (ref 22–29)
CREAT BLD-MCNC: 0.62 MG/DL (ref 0.57–1)
DEPRECATED RDW RBC AUTO: 40.4 FL (ref 37–54)
EOSINOPHIL # BLD AUTO: 0.17 10*3/MM3 (ref 0–0.4)
EOSINOPHIL NFR BLD AUTO: 3.2 % (ref 0.3–6.2)
ERYTHROCYTE [DISTWIDTH] IN BLOOD BY AUTOMATED COUNT: 12.6 % (ref 12.3–15.4)
GFR SERPL CREATININE-BSD FRML MDRD: 95 ML/MIN/1.73
GLOBULIN UR ELPH-MCNC: 2.4 GM/DL
GLUCOSE BLD-MCNC: 90 MG/DL (ref 65–99)
HCT VFR BLD AUTO: 37.5 % (ref 34–46.6)
HGB BLD-MCNC: 13 G/DL (ref 12–15.9)
IMM GRANULOCYTES # BLD AUTO: 0.02 10*3/MM3 (ref 0–0.05)
IMM GRANULOCYTES NFR BLD AUTO: 0.4 % (ref 0–0.5)
LYMPHOCYTES # BLD AUTO: 0.85 10*3/MM3 (ref 0.7–3.1)
LYMPHOCYTES NFR BLD AUTO: 16.2 % (ref 19.6–45.3)
MCH RBC QN AUTO: 30.4 PG (ref 26.6–33)
MCHC RBC AUTO-ENTMCNC: 34.7 G/DL (ref 31.5–35.7)
MCV RBC AUTO: 87.8 FL (ref 79–97)
MONOCYTES # BLD AUTO: 0.64 10*3/MM3 (ref 0.1–0.9)
MONOCYTES NFR BLD AUTO: 12.2 % (ref 5–12)
NEUTROPHILS # BLD AUTO: 3.55 10*3/MM3 (ref 1.7–7)
NEUTROPHILS NFR BLD AUTO: 67.4 % (ref 42.7–76)
NRBC BLD AUTO-RTO: 0 /100 WBC (ref 0–0.2)
PLATELET # BLD AUTO: 268 10*3/MM3 (ref 140–450)
PMV BLD AUTO: 9.7 FL (ref 6–12)
POTASSIUM BLD-SCNC: 4.9 MMOL/L (ref 3.5–5.2)
PROT SERPL-MCNC: 7.3 G/DL (ref 6–8.5)
RBC # BLD AUTO: 4.27 10*6/MM3 (ref 3.77–5.28)
SODIUM BLD-SCNC: 133 MMOL/L (ref 136–145)
WBC NRBC COR # BLD: 5.26 10*3/MM3 (ref 3.4–10.8)

## 2020-02-21 PROCEDURE — 36415 COLL VENOUS BLD VENIPUNCTURE: CPT

## 2020-02-21 PROCEDURE — 80053 COMPREHEN METABOLIC PANEL: CPT

## 2020-02-21 PROCEDURE — 85025 COMPLETE CBC W/AUTO DIFF WBC: CPT

## 2020-06-03 ENCOUNTER — OFFICE VISIT (OUTPATIENT)
Dept: NEUROLOGY | Facility: CLINIC | Age: 70
End: 2020-06-03

## 2020-06-03 VITALS
HEIGHT: 63 IN | HEART RATE: 87 BPM | DIASTOLIC BLOOD PRESSURE: 83 MMHG | TEMPERATURE: 97.5 F | BODY MASS INDEX: 28.7 KG/M2 | SYSTOLIC BLOOD PRESSURE: 146 MMHG | OXYGEN SATURATION: 98 % | WEIGHT: 162 LBS

## 2020-06-03 DIAGNOSIS — R26.9 GAIT ABNORMALITY: ICD-10-CM

## 2020-06-03 DIAGNOSIS — G35 MULTIPLE SCLEROSIS (HCC): ICD-10-CM

## 2020-06-03 DIAGNOSIS — G40.109 SEIZURE, TEMPORAL LOBE (HCC): ICD-10-CM

## 2020-06-03 DIAGNOSIS — F33.1 MODERATE EPISODE OF RECURRENT MAJOR DEPRESSIVE DISORDER (HCC): Primary | ICD-10-CM

## 2020-06-03 PROCEDURE — 99213 OFFICE O/P EST LOW 20 MIN: CPT | Performed by: NURSE PRACTITIONER

## 2020-06-03 RX ORDER — HYDRALAZINE HYDROCHLORIDE 25 MG/1
TABLET, FILM COATED ORAL
COMMUNITY
Start: 2020-05-22

## 2020-06-03 RX ORDER — FLUOXETINE HYDROCHLORIDE 60 MG/1
TABLET, FILM COATED ORAL; ORAL DAILY
COMMUNITY
Start: 2020-05-21

## 2020-06-03 RX ORDER — VALSARTAN AND HYDROCHLOROTHIAZIDE 320; 25 MG/1; MG/1
1 TABLET, FILM COATED ORAL DAILY
COMMUNITY
Start: 2020-05-14 | End: 2022-12-12

## 2020-06-03 RX ORDER — ARIPIPRAZOLE 10 MG/1
10 TABLET ORAL DAILY
Qty: 30 TABLET | Refills: 5 | Status: SHIPPED | OUTPATIENT
Start: 2020-06-03 | End: 2020-10-26

## 2020-06-03 RX ORDER — OMEPRAZOLE 40 MG/1
CAPSULE, DELAYED RELEASE ORAL
COMMUNITY
Start: 2020-05-07 | End: 2022-12-12

## 2020-06-03 RX ORDER — CETIRIZINE HYDROCHLORIDE 10 MG/1
10 TABLET ORAL DAILY
COMMUNITY
Start: 2020-05-19

## 2020-06-03 RX ORDER — AMLODIPINE BESYLATE 10 MG/1
TABLET ORAL DAILY
COMMUNITY
Start: 2020-05-07

## 2020-06-03 RX ORDER — SPIRONOLACTONE 25 MG/1
TABLET ORAL
COMMUNITY
Start: 2020-05-19 | End: 2022-12-12

## 2020-06-03 RX ORDER — ARIPIPRAZOLE 5 MG/1
10 TABLET ORAL DAILY
Qty: 30 TABLET | Refills: 5 | Status: SHIPPED | OUTPATIENT
Start: 2020-06-03 | End: 2020-06-03 | Stop reason: SDUPTHER

## 2020-06-03 RX ORDER — GABAPENTIN 300 MG/1
CAPSULE ORAL
COMMUNITY
Start: 2020-05-26

## 2020-06-03 NOTE — ASSESSMENT & PLAN NOTE
Psychological condition is unchanged.  Medication changes per orders.  Psychological condition  will be reassessed at the next regular appointment.    Increased Abilify to 10 mg PO daily.

## 2020-06-03 NOTE — ASSESSMENT & PLAN NOTE
Stable on Tecfidera    No new symptoms    Labs and MRI up to date, recently had labs drawn this week by PCP, will get results.     Discussed going 2 years between MRI's as long as symptoms are stable, patient prefers to have then done more infrequently.     F/U in 6 months or sooner if needed
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
## 2020-06-18 RX ORDER — ARIPIPRAZOLE 5 MG/1
TABLET ORAL
Qty: 30 TABLET | Refills: 5 | OUTPATIENT
Start: 2020-06-18

## 2020-06-25 ENCOUNTER — TELEPHONE (OUTPATIENT)
Dept: NEUROLOGY | Facility: CLINIC | Age: 70
End: 2020-06-25

## 2020-06-25 NOTE — TELEPHONE ENCOUNTER
For further treatment of allergies besides what she has already tried she will need to get set up with an Allergist.

## 2020-06-25 NOTE — TELEPHONE ENCOUNTER
Patient called and stated that when she was in her last appt with Ailin Aguilera they discussed her allergies and stated that nothing will help her. Apparently it was recommended that Xyzal would be worth trying so patient has tried that and it did not help. She now is wanting to know if there is a shot or something that could be given to her.       Can someone please contact her and discuss?     Odalis Kemp   [phone]

## 2020-08-05 ENCOUNTER — HOSPITAL ENCOUNTER (EMERGENCY)
Facility: HOSPITAL | Age: 70
Discharge: HOME | End: 2020-08-05
Attending: EMERGENCY MEDICINE | Admitting: EMERGENCY MEDICINE

## 2020-08-05 ENCOUNTER — APPOINTMENT (OUTPATIENT)
Dept: GENERAL RADIOLOGY | Facility: HOSPITAL | Age: 70
End: 2020-08-05

## 2020-08-05 ENCOUNTER — APPOINTMENT (OUTPATIENT)
Dept: CT IMAGING | Facility: HOSPITAL | Age: 70
End: 2020-08-05

## 2020-08-05 VITALS
BODY MASS INDEX: 27.46 KG/M2 | HEART RATE: 95 BPM | WEIGHT: 155 LBS | SYSTOLIC BLOOD PRESSURE: 160 MMHG | TEMPERATURE: 98.5 F | HEIGHT: 63 IN | RESPIRATION RATE: 20 BRPM | DIASTOLIC BLOOD PRESSURE: 93 MMHG | OXYGEN SATURATION: 95 %

## 2020-08-05 DIAGNOSIS — K64.9 HEMORRHOIDS, UNSPECIFIED HEMORRHOID TYPE: ICD-10-CM

## 2020-08-05 DIAGNOSIS — R19.7 BLOODY DIARRHEA: Primary | ICD-10-CM

## 2020-08-05 DIAGNOSIS — R79.89 ELEVATED SERUM CREATININE: ICD-10-CM

## 2020-08-05 DIAGNOSIS — R53.83 FATIGUE, UNSPECIFIED TYPE: ICD-10-CM

## 2020-08-05 LAB
ABO GROUP BLD: NORMAL
ABO GROUP BLD: NORMAL
ALBUMIN SERPL-MCNC: 4.7 G/DL (ref 3.5–5.2)
ALBUMIN/GLOB SERPL: 1.7 G/DL
ALP SERPL-CCNC: 61 U/L (ref 39–117)
ALT SERPL W P-5'-P-CCNC: 12 U/L (ref 1–33)
ANION GAP SERPL CALCULATED.3IONS-SCNC: 10 MMOL/L (ref 5–15)
AST SERPL-CCNC: 16 U/L (ref 1–32)
BASOPHILS # BLD AUTO: 0.03 10*3/MM3 (ref 0–0.2)
BASOPHILS NFR BLD AUTO: 0.4 % (ref 0–1.5)
BILIRUB SERPL-MCNC: 0.2 MG/DL (ref 0–1.2)
BILIRUB UR QL STRIP: NEGATIVE
BLD GP AB SCN SERPL QL: NEGATIVE
BUN SERPL-MCNC: 14 MG/DL (ref 8–23)
BUN/CREAT SERPL: 12.7 (ref 7–25)
CALCIUM SPEC-SCNC: 9.5 MG/DL (ref 8.6–10.5)
CHLORIDE SERPL-SCNC: 102 MMOL/L (ref 98–107)
CLARITY UR: CLEAR
CO2 SERPL-SCNC: 27 MMOL/L (ref 22–29)
COLOR UR: YELLOW
CREAT SERPL-MCNC: 1.1 MG/DL (ref 0.57–1)
D-LACTATE SERPL-SCNC: 0.8 MMOL/L (ref 0.5–2)
DEPRECATED RDW RBC AUTO: 40.8 FL (ref 37–54)
EOSINOPHIL # BLD AUTO: 0.05 10*3/MM3 (ref 0–0.4)
EOSINOPHIL NFR BLD AUTO: 0.7 % (ref 0.3–6.2)
ERYTHROCYTE [DISTWIDTH] IN BLOOD BY AUTOMATED COUNT: 12.4 % (ref 12.3–15.4)
GFR SERPL CREATININE-BSD FRML MDRD: 49 ML/MIN/1.73
GLOBULIN UR ELPH-MCNC: 2.8 GM/DL
GLUCOSE SERPL-MCNC: 138 MG/DL (ref 65–99)
GLUCOSE UR STRIP-MCNC: NEGATIVE MG/DL
HCT VFR BLD AUTO: 37.7 % (ref 34–46.6)
HGB BLD-MCNC: 12.4 G/DL (ref 12–15.9)
HGB UR QL STRIP.AUTO: NEGATIVE
HOLD SPECIMEN: NORMAL
HOLD SPECIMEN: NORMAL
IMM GRANULOCYTES # BLD AUTO: 0.02 10*3/MM3 (ref 0–0.05)
IMM GRANULOCYTES NFR BLD AUTO: 0.3 % (ref 0–0.5)
KETONES UR QL STRIP: ABNORMAL
LEUKOCYTE ESTERASE UR QL STRIP.AUTO: NEGATIVE
LYMPHOCYTES # BLD AUTO: 0.78 10*3/MM3 (ref 0.7–3.1)
LYMPHOCYTES NFR BLD AUTO: 10.6 % (ref 19.6–45.3)
MAGNESIUM SERPL-MCNC: 2 MG/DL (ref 1.6–2.4)
MCH RBC QN AUTO: 29.6 PG (ref 26.6–33)
MCHC RBC AUTO-ENTMCNC: 32.9 G/DL (ref 31.5–35.7)
MCV RBC AUTO: 90 FL (ref 79–97)
MONOCYTES # BLD AUTO: 0.71 10*3/MM3 (ref 0.1–0.9)
MONOCYTES NFR BLD AUTO: 9.6 % (ref 5–12)
NEUTROPHILS NFR BLD AUTO: 5.8 10*3/MM3 (ref 1.7–7)
NEUTROPHILS NFR BLD AUTO: 78.4 % (ref 42.7–76)
NITRITE UR QL STRIP: NEGATIVE
NRBC BLD AUTO-RTO: 0 /100 WBC (ref 0–0.2)
PH UR STRIP.AUTO: 5.5 [PH] (ref 5–8)
PLATELET # BLD AUTO: 252 10*3/MM3 (ref 140–450)
PMV BLD AUTO: 9.1 FL (ref 6–12)
POTASSIUM SERPL-SCNC: 3.3 MMOL/L (ref 3.5–5.2)
PROT SERPL-MCNC: 7.5 G/DL (ref 6–8.5)
PROT UR QL STRIP: ABNORMAL
RBC # BLD AUTO: 4.19 10*6/MM3 (ref 3.77–5.28)
RH BLD: POSITIVE
RH BLD: POSITIVE
SODIUM SERPL-SCNC: 139 MMOL/L (ref 136–145)
SP GR UR STRIP: 1.02 (ref 1–1.03)
T&S EXPIRATION DATE: NORMAL
TROPONIN T SERPL-MCNC: <0.01 NG/ML (ref 0–0.03)
UROBILINOGEN UR QL STRIP: ABNORMAL
WBC # BLD AUTO: 7.39 10*3/MM3 (ref 3.4–10.8)
WHOLE BLOOD HOLD SPECIMEN: NORMAL
WHOLE BLOOD HOLD SPECIMEN: NORMAL

## 2020-08-05 PROCEDURE — 83605 ASSAY OF LACTIC ACID: CPT | Performed by: EMERGENCY MEDICINE

## 2020-08-05 PROCEDURE — 71045 X-RAY EXAM CHEST 1 VIEW: CPT

## 2020-08-05 PROCEDURE — 86900 BLOOD TYPING SEROLOGIC ABO: CPT

## 2020-08-05 PROCEDURE — 84484 ASSAY OF TROPONIN QUANT: CPT | Performed by: EMERGENCY MEDICINE

## 2020-08-05 PROCEDURE — 99284 EMERGENCY DEPT VISIT MOD MDM: CPT

## 2020-08-05 PROCEDURE — 86901 BLOOD TYPING SEROLOGIC RH(D): CPT

## 2020-08-05 PROCEDURE — 80053 COMPREHEN METABOLIC PANEL: CPT | Performed by: EMERGENCY MEDICINE

## 2020-08-05 PROCEDURE — 86901 BLOOD TYPING SEROLOGIC RH(D): CPT | Performed by: EMERGENCY MEDICINE

## 2020-08-05 PROCEDURE — 74177 CT ABD & PELVIS W/CONTRAST: CPT

## 2020-08-05 PROCEDURE — 25010000002 IOPAMIDOL 61 % SOLUTION: Performed by: EMERGENCY MEDICINE

## 2020-08-05 PROCEDURE — 86900 BLOOD TYPING SEROLOGIC ABO: CPT | Performed by: EMERGENCY MEDICINE

## 2020-08-05 PROCEDURE — 93005 ELECTROCARDIOGRAM TRACING: CPT | Performed by: EMERGENCY MEDICINE

## 2020-08-05 PROCEDURE — 81003 URINALYSIS AUTO W/O SCOPE: CPT | Performed by: EMERGENCY MEDICINE

## 2020-08-05 PROCEDURE — 86850 RBC ANTIBODY SCREEN: CPT | Performed by: EMERGENCY MEDICINE

## 2020-08-05 PROCEDURE — 83735 ASSAY OF MAGNESIUM: CPT | Performed by: EMERGENCY MEDICINE

## 2020-08-05 PROCEDURE — 85025 COMPLETE CBC W/AUTO DIFF WBC: CPT | Performed by: EMERGENCY MEDICINE

## 2020-08-05 RX ORDER — METRONIDAZOLE 500 MG/1
500 TABLET ORAL 3 TIMES DAILY
Qty: 30 TABLET | Refills: 0 | Status: SHIPPED | OUTPATIENT
Start: 2020-08-05 | End: 2020-08-15

## 2020-08-05 RX ORDER — SODIUM CHLORIDE 0.9 % (FLUSH) 0.9 %
10 SYRINGE (ML) INJECTION AS NEEDED
Status: DISCONTINUED | OUTPATIENT
Start: 2020-08-05 | End: 2020-08-06 | Stop reason: HOSPADM

## 2020-08-05 RX ORDER — CIPROFLOXACIN 500 MG/1
500 TABLET, FILM COATED ORAL 2 TIMES DAILY
Qty: 16 TABLET | Refills: 0 | Status: SHIPPED | OUTPATIENT
Start: 2020-08-05 | End: 2020-08-13

## 2020-08-05 RX ADMIN — IOPAMIDOL 90 ML: 612 INJECTION, SOLUTION INTRAVENOUS at 19:55

## 2020-08-05 RX ADMIN — SODIUM CHLORIDE 1000 ML: 9 INJECTION, SOLUTION INTRAVENOUS at 22:21

## 2020-08-05 NOTE — ED PROVIDER NOTES
EMERGENCY DEPARTMENT ENCOUNTER    Room Number:  20/20  Date of encounter:  8/5/2020  PCP: Irene Bailey MD  Historian: Patient      HPI:  Chief Complaint: Generalized weakness, rectal bleeding    A complete HPI/ROS/PMH/PSH/SH/FH are unobtainable due to: N/A    Context: Odalis Mir is a 70 y.o. female who presents to the ED c/o generalized weakness for the past 3 days and diarrhea along with rectal bleeding beginning today.  Patient is a history of MS along with a baseline left-sided weakness secondary to this who presents today with generalized weakness, diarrhea, bloody stools.  She states that for the past month or so she is noticed herself being more winded with exertion.  For the past 3 days this is become more pronounced.  She denies a worsening shortness of breath but more just a generalized weakness.  This morning she developed a greenish-brown diarrhea which then transitioned to a dark red color followed by bright red bowel movement with more significant amount of bleeding.  Combination of symptoms prompted her visit to the emergency department.  She denies fever, chills, chest pain, abdominal pain, or vomiting.  She denies similar previous episodes in the past.  She does have a history of hemorrhoids and in the past had hemorrhoidectomy performed but never did have significant bleeding consistent with today's presentation.  She has had no noted increase in her hemorrhoids as of late either.  Patient is not on any anticoagulants and has had regular screening colonoscopies in the past.      PAST MEDICAL HISTORY  Active Ambulatory Problems     Diagnosis Date Noted   • Closed fracture of lower end of right radius with routine healing 11/13/2018   • Closed fracture of right distal radius 11/14/2018   • Multiple sclerosis (CMS/HCC) 08/21/2019   • Moderate episode of recurrent major depressive disorder (CMS/HCC) 08/21/2019   • PBA (pseudobulbar affect) 08/21/2019   • Seizure, temporal lobe (CMS/HCC)  08/21/2019   • Gait abnormality 11/05/2019     Resolved Ambulatory Problems     Diagnosis Date Noted   • No Resolved Ambulatory Problems     Past Medical History:   Diagnosis Date   • Anxiety and depression    • Elevated cholesterol    • GERD (gastroesophageal reflux disease)    • Hypertension    • Osteoarthritis    • Seizure (CMS/HCC)          PAST SURGICAL HISTORY  Past Surgical History:   Procedure Laterality Date   • APPENDECTOMY     • COLONOSCOPY     • EYE SURGERY Right     Bleeding behind the retina    • HAND SURGERY Right     thumb   • HYSTERECTOMY     • ORIF WRIST FRACTURE Right 11/14/2018    Procedure: RIGHT WRIST INTRA  ARTICULAR  DISTAL RADIUS FRACTURE OPEN REDUCTION INTERNAL FIXATION;  Surgeon: El Parks MD;  Location: [facility];  Service: Orthopedics         FAMILY HISTORY  Family History   Problem Relation Age of Onset   • COPD Mother    • Hypertension Father    • Alzheimer's disease Father    • Dementia Father    • Cancer Paternal Grandmother          SOCIAL HISTORY  Social History     Socioeconomic History   • Marital status: Single     Spouse name: Not on file   • Number of children: Not on file   • Years of education: Not on file   • Highest education level: Not on file   Tobacco Use   • Smoking status: Never Smoker   • Smokeless tobacco: Never Used   Substance and Sexual Activity   • Alcohol use: No   • Drug use: No   • Sexual activity: Defer         ALLERGIES  Shellfish-derived products and Sulfa antibiotics        REVIEW OF SYSTEMS  Review of Systems   Per HPI  All systems reviewed and negative except for those discussed in HPI.       PHYSICAL EXAM    I have reviewed the triage vital signs and nursing notes.    ED Triage Vitals   Temp Heart Rate Resp BP SpO2   08/05/20 1807 08/05/20 1806 08/05/20 1806 08/05/20 1806 08/05/20 1806   98.5 °F (36.9 °C) 90 20 161/87 96 %      Temp src Heart Rate Source Patient Position BP Location FiO2 (%)   08/05/20 1807 -- -- -- --   Oral            Physical Exam  GENERAL: Resting comfortably in bed. Well developed.  Appears in no acute distress.  HENT: Nares patent  EYES: No scleral icterus  CV: Regular rhythm, regular rate  RESPIRATORY: Normal effort.  No audible wheezes, rales or rhonchi  ABDOMEN: Soft, nontender  MUSCULOSKELETAL: No deformities.   NEURO: Alert, moves all extremities, follows commands. Baseline left sided weakness.  SKIN: Warm, dry, no rash visualized        LAB RESULTS  Recent Results (from the past 24 hour(s))   Comprehensive Metabolic Panel    Collection Time: 08/05/20  6:09 PM   Result Value Ref Range    Glucose 138 (H) 65 - 99 mg/dL    BUN 14 8 - 23 mg/dL    Creatinine 1.10 (H) 0.57 - 1.00 mg/dL    Sodium 139 136 - 145 mmol/L    Potassium 3.3 (L) 3.5 - 5.2 mmol/L    Chloride 102 98 - 107 mmol/L    CO2 27.0 22.0 - 29.0 mmol/L    Calcium 9.5 8.6 - 10.5 mg/dL    Total Protein 7.5 6.0 - 8.5 g/dL    Albumin 4.70 3.50 - 5.20 g/dL    ALT (SGPT) 12 1 - 33 U/L    AST (SGOT) 16 1 - 32 U/L    Alkaline Phosphatase 61 39 - 117 U/L    Total Bilirubin 0.2 0.0 - 1.2 mg/dL    eGFR Non African Amer 49 (L) >60 mL/min/1.73    Globulin 2.8 gm/dL    A/G Ratio 1.7 g/dL    BUN/Creatinine Ratio 12.7 7.0 - 25.0    Anion Gap 10.0 5.0 - 15.0 mmol/L   Troponin    Collection Time: 08/05/20  6:09 PM   Result Value Ref Range    Troponin T <0.010 0.000 - 0.030 ng/mL   Magnesium    Collection Time: 08/05/20  6:09 PM   Result Value Ref Range    Magnesium 2.0 1.6 - 2.4 mg/dL   Lactic Acid, Plasma    Collection Time: 08/05/20  6:09 PM   Result Value Ref Range    Lactate 0.8 0.5 - 2.0 mmol/L   CBC Auto Differential    Collection Time: 08/05/20  6:09 PM   Result Value Ref Range    WBC 7.39 3.40 - 10.80 10*3/mm3    RBC 4.19 3.77 - 5.28 10*6/mm3    Hemoglobin 12.4 12.0 - 15.9 g/dL    Hematocrit 37.7 34.0 - 46.6 %    MCV 90.0 79.0 - 97.0 fL    MCH 29.6 26.6 - 33.0 pg    MCHC 32.9 31.5 - 35.7 g/dL    RDW 12.4 12.3 - 15.4 %    RDW-SD 40.8 37.0 - 54.0 fl    MPV 9.1 6.0 -  12.0 fL    Platelets 252 140 - 450 10*3/mm3    Neutrophil % 78.4 (H) 42.7 - 76.0 %    Lymphocyte % 10.6 (L) 19.6 - 45.3 %    Monocyte % 9.6 5.0 - 12.0 %    Eosinophil % 0.7 0.3 - 6.2 %    Basophil % 0.4 0.0 - 1.5 %    Immature Grans % 0.3 0.0 - 0.5 %    Neutrophils, Absolute 5.80 1.70 - 7.00 10*3/mm3    Lymphocytes, Absolute 0.78 0.70 - 3.10 10*3/mm3    Monocytes, Absolute 0.71 0.10 - 0.90 10*3/mm3    Eosinophils, Absolute 0.05 0.00 - 0.40 10*3/mm3    Basophils, Absolute 0.03 0.00 - 0.20 10*3/mm3    Immature Grans, Absolute 0.02 0.00 - 0.05 10*3/mm3    nRBC 0.0 0.0 - 0.2 /100 WBC       Ordered the above labs and independently reviewed the results.    No results found for this or any previous visit (from the past 24 hour(s)).  Note: In addition to lab results from this visit, the labs listed above may include labs taken at another facility or during a different encounter within the last 24 hours. Please correlate lab times with ED admission and discharge times for further clarification of the services performed during this visit.    CT Abdomen Pelvis With Contrast   Final Result      1. No acute abdominal or pelvic findings.   2. Appendix not clearly visualized. No pericecal inflammation.   3. Hysterectomy.               Signer Name: Chucho Vallejo MD    Signed: 8/5/2020 8:05 PM    Workstation Name: [id number]     [facility]      XR Chest 1 View   Final Result   No acute cardiopulmonary findings. No appreciable change from 10/9/2019. No free air seen in the abdomen.      Signer Name: Carito Conrad MD    Signed: 8/5/2020 7:55 PM    Workstation Name: [id number]     [facility]        Vitals:    08/05/20 2030 08/05/20 2100 08/05/20 2214 08/05/20 2324   BP: 132/86 145/84 137/92 160/93   Pulse: 96 95     Resp:       Temp:       TempSrc:       SpO2: 94% 95%     Weight:       Height:         Medications   iopamidol (ISOVUE-300) 61 % injection 100 mL (90 mL Intravenous  Given 8/5/20 1955)   sodium chloride 0.9 % bolus 1,000 mL (0 mL Intravenous Stopped 8/5/20 2325)     ECG/EMG Results (last 24 hours)     ** No results found for the last 24 hours. **        ECG 12 Lead   Final Result   Test Reason : Weak/Dizzy/AMS protocol   Blood Pressure : **/** mmHG   Vent. Rate : 096 BPM     Atrial Rate : 096 BPM      P-R Int : 152 ms          QRS Dur : 076 ms       QT Int : 354 ms       P-R-T Axes : 062 021 027 degrees      QTc Int : 447 ms      Normal sinus rhythm   Normal ECG   When compared with ECG of 09-OCT-2019 15:09,   No significant change was found   Confirmed by MD ANDERSON CORY ([id number]) on 8/6/2020 8:18:31 PM      Referred By:  EDMD           Confirmed By:ASUNCION ANDERSON MD              RADIOLOGY    I ordered and reviewed the above noted radiographic studies.    I viewed images of CT abd/pel which showed no acute abnormality. per my independent interpretation.  See radiologist's dictation for official interpretation.        PROCEDURES    Procedures      MEDICATIONS GIVEN IN ER    Medications   sodium chloride 0.9 % flush 10 mL (has no administration in time range)         PROGRESS, DATA ANALYSIS, CONSULTS, AND MEDICAL DECISION MAKING    All labs have been independently reviewed by me.  All radiology studies have been reviewed by me and the radiologist dictating the report.   EKG's have been independently viewed and interpreted by me.                 AS OF 18:43 VITALS:    BP - 161/87  HR - 90  TEMP - 98.5 °F (36.9 °C) (Oral)  O2 SATS - 96%        DIAGNOSIS  Final diagnoses:   Bloody diarrhea   Fatigue, unspecified type   Hemorrhoids, unspecified hemorrhoid type   Elevated serum creatinine         DISPOSITION  DISCHARGE    Patient discharged in stable condition.    Reviewed implications of results, diagnosis, meds, responsibility to follow up, warning signs and symptoms of possible worsening, potential complications and reasons to return to ER.    Patient/Family voiced understanding of above  instructions.    Discussed plan for discharge, as there is no emergent indication for admission.  Pt/family is agreeable and understands need for follow up and possible repeat testing.  Pt/family is aware that discharge does not mean that nothing is wrong but that it indicates no emergency is currently present that requires admission and they must continue care with follow-up as given below or with a physician of their choice.     FOLLOW-UP  Irene Bailey MD  [address]  [facility] [address] [phone]    Schedule an appointment as soon as possible for a visit       Jaswinder Thakkar MD  [address] [facility]   [place] [id number] [phone]    Schedule an appointment as soon as possible for a visit            Medication List      New Prescriptions    ciprofloxacin 500 MG tablet  Commonly known as:  CIPRO  Take 1 tablet by mouth 2 (Two) Times a Day for 8 days.     metroNIDAZOLE 500 MG tablet  Commonly known as:  FLAGYL  Take 1 tablet by mouth 3 (Three) Times a Day for 10 days.                     Dorian Malave,   08/07/20 0200

## 2020-09-24 RX ORDER — DIMETHYL FUMARATE 240 MG/1
240 CAPSULE ORAL 2 TIMES DAILY
Qty: 60 CAPSULE | Refills: 11 | Status: SHIPPED | OUTPATIENT
Start: 2020-09-24 | End: 2021-10-14 | Stop reason: SDUPTHER

## 2020-09-25 ENCOUNTER — TELEPHONE (OUTPATIENT)
Dept: NEUROLOGY | Facility: CLINIC | Age: 70
End: 2020-09-25

## 2020-09-25 NOTE — TELEPHONE ENCOUNTER
----- Message from Jeferson Villarreal MA sent at 9/25/2020 10:58 AM EDT -----  Okay to write letter? Or would office note suffice?   ----- Message -----  From: Renata Ordaz  Sent: 9/25/2020  10:00 AM EDT  To: Jeferson Villarreal MA    Ms. Mir says she needs some documentation from Dr. Perez/Ailin/Nai stating she is diagnosed with MS and that fatigue is a symptom. Her PCP is trying to get a PA approved for the Ritalin 10mg BID and thinks a statement from the neurologist will help. Not sure what to do in this case. Patient called to tell me this...

## 2020-09-25 NOTE — TELEPHONE ENCOUNTER
Dimethyl fumerate refills submitted to [facility] and processed on 9/23/20.

## 2020-09-25 NOTE — TELEPHONE ENCOUNTER
Last office note faxed to patient PCP, Dr. Irene Bailey, at [phone]. Fax successful.   -TMT 09/25/2020

## 2020-09-28 RX ORDER — CARBAMAZEPINE 200 MG/1
TABLET ORAL
Qty: 450 TABLET | Refills: 2 | Status: SHIPPED | OUTPATIENT
Start: 2020-09-28 | End: 2021-06-14

## 2020-10-23 ENCOUNTER — TELEPHONE (OUTPATIENT)
Dept: NEUROLOGY | Facility: CLINIC | Age: 70
End: 2020-10-23

## 2020-10-23 ENCOUNTER — SPECIALTY PHARMACY (OUTPATIENT)
Dept: ONCOLOGY | Facility: HOSPITAL | Age: 70
End: 2020-10-23

## 2020-10-23 DIAGNOSIS — F33.1 MODERATE EPISODE OF RECURRENT MAJOR DEPRESSIVE DISORDER (HCC): ICD-10-CM

## 2020-10-23 DIAGNOSIS — F48.2 PBA (PSEUDOBULBAR AFFECT): Primary | ICD-10-CM

## 2020-10-23 NOTE — PROGRESS NOTES
Oral Neurology Medication Follow-Up        Patient Name/:     Odalis Mir   1950  Oral Neurology Medication Regimen:  Tecfidera DR 120mg PO BID x 7 days, then 240mg PO BID thereafter           Initial Teaching Follow Up Comments      Safety      Storage instructions (away from children; away from heat/cold, sunlight, or moisture)       “How are you storing your medications?”, reminders on storage, proper handling (away from children, managing waste, etc.), disposal of medication with D/C or dosage change     Patient reports appropriate storage and handling.      Adherence       patient and/or caregiver on how to take medication, take with/without food, assess their adherence potential, stress importance of adherence, ways to manage adherence (pill boxes, phone reminders, calendars), what to do if miss a dose    “How are you taking your medication?” “How are you remembering to take your medication?”, “How many doses have you missed?”, determine reasons for non-adherence (not remembering, side effects, etc), ways to improve, overadherence? Remind patient of ways to improve/maintain adherence    Confirmed dose of Tecfidera 240mg by mouth twice a day. Refill processed at [facility] and will be shipped to pt.      Side Effects/Adverse Reactions       patient on potential side effects, s/s, ways to manage, when to call MD/seek help       Determine if patient experiencing side effects, ways to manage  Patient denies adverse effects from Tecfidera but does express concern over her mood and feels her Abilify is not working. APRN notified.      Miscellaneous      Food interactions, DDIs, financial issues Determine if patient started any new medications (analyze for DDI)       Additional Notes: Discussed aforementioned material with patient by phone. All questions and concerns addressed. Patient provided with my contact information and instructed to call if additional questions should arise. Notified  [facility] of refill request.

## 2020-10-23 NOTE — TELEPHONE ENCOUNTER
----- Message from Cheryl Montes RP sent at 10/23/2020 11:36 AM EDT -----  Regarding: RE: Depression  Returned call to patient who said she didn't see anyone when she was previously referred and would like a new referral placed. Thanks!  ----- Message -----  From: Ailin Aguilera APRN  Sent: 10/23/2020  11:25 AM EDT  To: Cheryl Montes [facility]  Subject: RE: Depression                                   I sent her to Psychiatry in December, she has struggled with depression and we have tried Neudexta and other antidepressants with no improvement in symptoms.   If she needs a new Psychiatry referral I can place one.   ----- Message -----  From: Cheryl Montes [facility]  Sent: 10/23/2020  11:13 AM EDT  To: QUINTON Melgar  Subject: Depression                                       S/w patient today regarding Tecfidera refill and is doing well on Tecfidera but expressed concerns about her mood. She feels her Abilify is not working as she does not even leave her apartment and says she has not been like this in the past. Her Abilify was increased to 10mg daily in June and she has been compliant with the new dosage. Her next F/U is scheduled for 12/9. What do you recommend?

## 2020-10-26 DIAGNOSIS — F33.1 MODERATE EPISODE OF RECURRENT MAJOR DEPRESSIVE DISORDER (HCC): ICD-10-CM

## 2020-10-26 RX ORDER — ARIPIPRAZOLE 10 MG/1
10 TABLET ORAL DAILY
Qty: 30 TABLET | Refills: 5 | Status: SHIPPED | OUTPATIENT
Start: 2020-10-26 | End: 2021-04-15

## 2020-11-25 ENCOUNTER — SPECIALTY PHARMACY (OUTPATIENT)
Dept: ONCOLOGY | Facility: HOSPITAL | Age: 70
End: 2020-11-25

## 2020-11-25 NOTE — PROGRESS NOTES
Oral Neurology Medication Follow-Up        Patient Name/:     Odalis Mir   1950  Oral Neurology Medication Regimen:  Tecfidera DR 120mg PO BID x 7 days, then 240mg PO BID thereafter           Initial Teaching Follow Up Comments      Safety      Storage instructions (away from children; away from heat/cold, sunlight, or moisture)       “How are you storing your medications?”, reminders on storage, proper handling (away from children, managing waste, etc.), disposal of medication with D/C or dosage change     Patient reports appropriate storage and handling.      Adherence       patient and/or caregiver on how to take medication, take with/without food, assess their adherence potential, stress importance of adherence, ways to manage adherence (pill boxes, phone reminders, calendars), what to do if miss a dose    “How are you taking your medication?” “How are you remembering to take your medication?”, “How many doses have you missed?”, determine reasons for non-adherence (not remembering, side effects, etc), ways to improve, overadherence? Remind patient of ways to improve/maintain adherence    Confirmed dose of Tecfidera 240mg by mouth twice a day. Refill processed at [facility] and will be shipped to pt.      Side Effects/Adverse Reactions       patient on potential side effects, s/s, ways to manage, when to call MD/seek help       Determine if patient experiencing side effects, ways to manage  Patient denies adverse effects.      Miscellaneous      Food interactions, DDIs, financial issues Determine if patient started any new medications (analyze for DDI)       Additional Notes: Discussed aforementioned material with patient by phone. All questions and concerns addressed. Patient provided with my contact information and instructed to call if additional questions should arise. Notified [facility] of refill request.

## 2020-12-09 ENCOUNTER — LAB (OUTPATIENT)
Dept: LAB | Facility: HOSPITAL | Age: 70
End: 2020-12-09

## 2020-12-09 ENCOUNTER — OFFICE VISIT (OUTPATIENT)
Dept: NEUROLOGY | Facility: CLINIC | Age: 70
End: 2020-12-09

## 2020-12-09 VITALS
OXYGEN SATURATION: 98 % | SYSTOLIC BLOOD PRESSURE: 142 MMHG | BODY MASS INDEX: 27.46 KG/M2 | TEMPERATURE: 96.8 F | HEIGHT: 63 IN | HEART RATE: 86 BPM | WEIGHT: 155 LBS | DIASTOLIC BLOOD PRESSURE: 78 MMHG

## 2020-12-09 DIAGNOSIS — R53.82 CHRONIC FATIGUE: ICD-10-CM

## 2020-12-09 DIAGNOSIS — G35 MULTIPLE SCLEROSIS (HCC): ICD-10-CM

## 2020-12-09 DIAGNOSIS — F32.2 CURRENT SEVERE EPISODE OF MAJOR DEPRESSIVE DISORDER WITHOUT PSYCHOTIC FEATURES WITHOUT PRIOR EPISODE (HCC): ICD-10-CM

## 2020-12-09 DIAGNOSIS — F33.1 MODERATE EPISODE OF RECURRENT MAJOR DEPRESSIVE DISORDER (HCC): ICD-10-CM

## 2020-12-09 DIAGNOSIS — G40.109 SEIZURE, TEMPORAL LOBE (HCC): ICD-10-CM

## 2020-12-09 DIAGNOSIS — F41.9 ANXIETY: ICD-10-CM

## 2020-12-09 DIAGNOSIS — R26.9 GAIT ABNORMALITY: ICD-10-CM

## 2020-12-09 DIAGNOSIS — F48.2 PBA (PSEUDOBULBAR AFFECT): ICD-10-CM

## 2020-12-09 LAB
BASOPHILS # BLD AUTO: 0.02 10*3/MM3 (ref 0–0.2)
BASOPHILS NFR BLD AUTO: 0.3 % (ref 0–1.5)
DEPRECATED RDW RBC AUTO: 44.2 FL (ref 37–54)
EOSINOPHIL # BLD AUTO: 0.04 10*3/MM3 (ref 0–0.4)
EOSINOPHIL NFR BLD AUTO: 0.7 % (ref 0.3–6.2)
ERYTHROCYTE [DISTWIDTH] IN BLOOD BY AUTOMATED COUNT: 13.8 % (ref 12.3–15.4)
HCT VFR BLD AUTO: 35.7 % (ref 34–46.6)
HGB BLD-MCNC: 11.7 G/DL (ref 12–15.9)
IMM GRANULOCYTES # BLD AUTO: 0.01 10*3/MM3 (ref 0–0.05)
IMM GRANULOCYTES NFR BLD AUTO: 0.2 % (ref 0–0.5)
LYMPHOCYTES # BLD AUTO: 0.56 10*3/MM3 (ref 0.7–3.1)
LYMPHOCYTES NFR BLD AUTO: 9.8 % (ref 19.6–45.3)
MCH RBC QN AUTO: 28.5 PG (ref 26.6–33)
MCHC RBC AUTO-ENTMCNC: 32.8 G/DL (ref 31.5–35.7)
MCV RBC AUTO: 86.9 FL (ref 79–97)
MONOCYTES # BLD AUTO: 0.4 10*3/MM3 (ref 0.1–0.9)
MONOCYTES NFR BLD AUTO: 7 % (ref 5–12)
NEUTROPHILS NFR BLD AUTO: 4.7 10*3/MM3 (ref 1.7–7)
NEUTROPHILS NFR BLD AUTO: 82 % (ref 42.7–76)
NRBC BLD AUTO-RTO: 0 /100 WBC (ref 0–0.2)
PLATELET # BLD AUTO: 257 10*3/MM3 (ref 140–450)
PMV BLD AUTO: 10 FL (ref 6–12)
RBC # BLD AUTO: 4.11 10*6/MM3 (ref 3.77–5.28)
WBC # BLD AUTO: 5.73 10*3/MM3 (ref 3.4–10.8)

## 2020-12-09 PROCEDURE — 85025 COMPLETE CBC W/AUTO DIFF WBC: CPT

## 2020-12-09 PROCEDURE — 36415 COLL VENOUS BLD VENIPUNCTURE: CPT

## 2020-12-09 PROCEDURE — 80053 COMPREHEN METABOLIC PANEL: CPT

## 2020-12-09 PROCEDURE — 99214 OFFICE O/P EST MOD 30 MIN: CPT | Performed by: NURSE PRACTITIONER

## 2020-12-09 RX ORDER — HYDROXYZINE HYDROCHLORIDE 25 MG/1
TABLET, FILM COATED ORAL
COMMUNITY
Start: 2020-10-29 | End: 2022-12-12

## 2020-12-09 RX ORDER — AMANTADINE HYDROCHLORIDE 100 MG/1
100 CAPSULE, GELATIN COATED ORAL 2 TIMES DAILY
Qty: 30 CAPSULE | Refills: 5 | Status: SHIPPED | OUTPATIENT
Start: 2020-12-09 | End: 2020-12-16

## 2020-12-09 RX ORDER — MONTELUKAST SODIUM 10 MG/1
10 TABLET ORAL NIGHTLY
COMMUNITY
Start: 2020-11-13

## 2020-12-09 NOTE — ASSESSMENT & PLAN NOTE
Improved clinic metrics     Stable on Tecfidera     Ordered labs     Referred pt to PT and SLP with [facility], patient states she has a care provider who brings her to her Drs appointments.
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
## 2020-12-09 NOTE — ASSESSMENT & PLAN NOTE
Psychological condition is worsening.  Referral to psychiatry.  Psychological condition  will be reassessed at the next regular appointment.    I have made multiple referrals to Psychiatry, patient states she does not follow thru with scheduling an appointment. Expresses wish to improve. I have stressed the importance of scheduling with Psychiatry as she is on and has tried multiple medications with no improvement in symptoms. She V/U and states she will go.

## 2020-12-10 ENCOUNTER — TELEPHONE (OUTPATIENT)
Dept: NEUROLOGY | Facility: CLINIC | Age: 70
End: 2020-12-10

## 2020-12-10 DIAGNOSIS — G35 MULTIPLE SCLEROSIS (HCC): Primary | ICD-10-CM

## 2020-12-10 LAB
ALBUMIN SERPL-MCNC: 4.8 G/DL (ref 3.5–5.2)
ALBUMIN/GLOB SERPL: 1.8 G/DL
ALP SERPL-CCNC: 68 U/L (ref 39–117)
ALT SERPL W P-5'-P-CCNC: 9 U/L (ref 1–33)
ANION GAP SERPL CALCULATED.3IONS-SCNC: 16.6 MMOL/L (ref 5–15)
AST SERPL-CCNC: 14 U/L (ref 1–32)
BILIRUB SERPL-MCNC: 0.3 MG/DL (ref 0–1.2)
BUN SERPL-MCNC: 18 MG/DL (ref 8–23)
BUN/CREAT SERPL: 23.1 (ref 7–25)
CALCIUM SPEC-SCNC: 9.9 MG/DL (ref 8.6–10.5)
CHLORIDE SERPL-SCNC: 94 MMOL/L (ref 98–107)
CO2 SERPL-SCNC: 25.4 MMOL/L (ref 22–29)
CREAT SERPL-MCNC: 0.78 MG/DL (ref 0.57–1)
GFR SERPL CREATININE-BSD FRML MDRD: 73 ML/MIN/1.73
GLOBULIN UR ELPH-MCNC: 2.7 GM/DL
GLUCOSE SERPL-MCNC: 100 MG/DL (ref 65–99)
POTASSIUM SERPL-SCNC: 3.8 MMOL/L (ref 3.5–5.2)
PROT SERPL-MCNC: 7.5 G/DL (ref 6–8.5)
SODIUM SERPL-SCNC: 136 MMOL/L (ref 136–145)

## 2020-12-10 NOTE — TELEPHONE ENCOUNTER
Called x1. LVM for patient to call back so that we can discuss those questions she had for Mati. Gave her our call back number.

## 2020-12-10 NOTE — PROGRESS NOTES
Please let Ms. Mir know that her Absolute Lymphocyte count which we watch while taking Tecfidera is borderline low. I would like to recheck her labs next month and make sure it improves.   I will place orders, she can stop by any [facility] lab to have this drawn around January 9th.     Thanks!

## 2020-12-10 NOTE — TELEPHONE ENCOUNTER
Spoke with patient explained the lab is just a marker we watch since she is on Tecfidera. It can go up and down over time so Ailin would like for her to get labs drawn again around 1/9/2021 to see where the level is at that time. Explained to patient that if its lower patient might need to be switched to another medication but we would assess that after she gets her labs redrawn. Informed patient to continue taking her Tecfidera like normal.  Patient verbalized understanding.

## 2020-12-10 NOTE — TELEPHONE ENCOUNTER
Patient asked questions concerning her MS, that I could not answer. Can you contact patient and possibly get some answers for her concerns?

## 2020-12-10 NOTE — TELEPHONE ENCOUNTER
----- Message from QUINTON Melgar sent at 12/10/2020  8:37 AM EST -----  Please let Ms. Mir know that her Absolute Lymphocyte count which we watch while taking Tecfidera is borderline low. I would like to recheck her labs next month and make sure it improves.   I will place orders, she can stop by any [facility] lab to have this drawn around January 9th.     Thanks!

## 2020-12-28 ENCOUNTER — SPECIALTY PHARMACY (OUTPATIENT)
Dept: ONCOLOGY | Facility: HOSPITAL | Age: 70
End: 2020-12-28

## 2020-12-28 NOTE — PROGRESS NOTES
Oral Neurology Medication Follow-Up        Patient Name/:     Odalis Mir   1950  Oral Neurology Medication Regimen:  Tecfidera DR 120mg PO BID x 7 days, then 240mg PO BID thereafter           Initial Teaching Follow Up Comments      Safety      Storage instructions (away from children; away from heat/cold, sunlight, or moisture)       “How are you storing your medications?”, reminders on storage, proper handling (away from children, managing waste, etc.), disposal of medication with D/C or dosage change     Patient reports appropriate storage and handling.      Adherence       patient and/or caregiver on how to take medication, take with/without food, assess their adherence potential, stress importance of adherence, ways to manage adherence (pill boxes, phone reminders, calendars), what to do if miss a dose    “How are you taking your medication?” “How are you remembering to take your medication?”, “How many doses have you missed?”, determine reasons for non-adherence (not remembering, side effects, etc), ways to improve, overadherence? Remind patient of ways to improve/maintain adherence    Confirmed dose of Tecfidera 240mg by mouth twice a day. Refill processed at [facility] and will be shipped to pt.      Side Effects/Adverse Reactions       patient on potential side effects, s/s, ways to manage, when to call MD/seek help       Determine if patient experiencing side effects, ways to manage  Patient denies adverse effects.      Miscellaneous      Food interactions, DDIs, financial issues Determine if patient started any new medications (analyze for DDI)       Additional Notes: Discussed aforementioned material with patient by phone. All questions and concerns addressed. Patient provided with my contact information and instructed to call if additional questions should arise. Notified [facility] of refill request.

## 2021-01-26 ENCOUNTER — TELEPHONE (OUTPATIENT)
Dept: ONCOLOGY | Facility: HOSPITAL | Age: 71
End: 2021-01-26

## 2021-01-26 NOTE — TELEPHONE ENCOUNTER
Spoke with patient on this date regarding Tecfidera. Patient was seen in office on 12/9/2020 and completed labwork. ANC = 560 on 12/9/2020 and patient was to have CBC w/diff redrawn in 1 month. Patient has not had redrawn due to sickness and transportation issues. Encouraged patient to have labwork completed as soon as possible to avoid interruption in therapy. Patient notified that she will not receive further refills of Tecfidera until labwork completed and reviewed. Patient has about 2 weeks of medication remaining and will attempt to complete labwork within the next 2 weeks. APRN notified.

## 2021-02-02 ENCOUNTER — LAB (OUTPATIENT)
Dept: LAB | Facility: HOSPITAL | Age: 71
End: 2021-02-02

## 2021-02-02 DIAGNOSIS — G35 MULTIPLE SCLEROSIS (HCC): ICD-10-CM

## 2021-02-02 LAB
BASOPHILS # BLD AUTO: 0.03 10*3/MM3 (ref 0–0.2)
BASOPHILS NFR BLD AUTO: 0.5 % (ref 0–1.5)
DEPRECATED RDW RBC AUTO: 42.9 FL (ref 37–54)
EOSINOPHIL # BLD AUTO: 0.13 10*3/MM3 (ref 0–0.4)
EOSINOPHIL NFR BLD AUTO: 2.1 % (ref 0.3–6.2)
ERYTHROCYTE [DISTWIDTH] IN BLOOD BY AUTOMATED COUNT: 13.4 % (ref 12.3–15.4)
HCT VFR BLD AUTO: 32.7 % (ref 34–46.6)
HGB BLD-MCNC: 11.2 G/DL (ref 12–15.9)
IMM GRANULOCYTES # BLD AUTO: 0.03 10*3/MM3 (ref 0–0.05)
IMM GRANULOCYTES NFR BLD AUTO: 0.5 % (ref 0–0.5)
LYMPHOCYTES # BLD AUTO: 0.54 10*3/MM3 (ref 0.7–3.1)
LYMPHOCYTES NFR BLD AUTO: 8.8 % (ref 19.6–45.3)
MCH RBC QN AUTO: 30.2 PG (ref 26.6–33)
MCHC RBC AUTO-ENTMCNC: 34.3 G/DL (ref 31.5–35.7)
MCV RBC AUTO: 88.1 FL (ref 79–97)
MONOCYTES # BLD AUTO: 0.45 10*3/MM3 (ref 0.1–0.9)
MONOCYTES NFR BLD AUTO: 7.3 % (ref 5–12)
NEUTROPHILS NFR BLD AUTO: 4.96 10*3/MM3 (ref 1.7–7)
NEUTROPHILS NFR BLD AUTO: 80.8 % (ref 42.7–76)
NRBC BLD AUTO-RTO: 0 /100 WBC (ref 0–0.2)
PLATELET # BLD AUTO: 228 10*3/MM3 (ref 140–450)
PMV BLD AUTO: 9.9 FL (ref 6–12)
RBC # BLD AUTO: 3.71 10*6/MM3 (ref 3.77–5.28)
WBC # BLD AUTO: 6.14 10*3/MM3 (ref 3.4–10.8)

## 2021-02-02 PROCEDURE — 85025 COMPLETE CBC W/AUTO DIFF WBC: CPT

## 2021-02-02 PROCEDURE — 36415 COLL VENOUS BLD VENIPUNCTURE: CPT

## 2021-02-03 ENCOUNTER — SPECIALTY PHARMACY (OUTPATIENT)
Dept: ONCOLOGY | Facility: HOSPITAL | Age: 71
End: 2021-02-03

## 2021-02-03 ENCOUNTER — TELEPHONE (OUTPATIENT)
Dept: NEUROLOGY | Facility: CLINIC | Age: 71
End: 2021-02-03

## 2021-02-03 DIAGNOSIS — G35 MULTIPLE SCLEROSIS (HCC): Primary | ICD-10-CM

## 2021-02-03 NOTE — PROGRESS NOTES
Oral Neurology Medication Follow-Up        Patient Name/:     Odalis Mir   1950  Oral Neurology Medication Regimen:  Tecfidera DR 120mg PO BID x 7 days, then 240mg PO BID thereafter           Initial Teaching Follow Up Comments      Safety      Storage instructions (away from children; away from heat/cold, sunlight, or moisture)       “How are you storing your medications?”, reminders on storage, proper handling (away from children, managing waste, etc.), disposal of medication with D/C or dosage change     Patient reports appropriate storage and handling.      Adherence       patient and/or caregiver on how to take medication, take with/without food, assess their adherence potential, stress importance of adherence, ways to manage adherence (pill boxes, phone reminders, calendars), what to do if miss a dose    “How are you taking your medication?” “How are you remembering to take your medication?”, “How many doses have you missed?”, determine reasons for non-adherence (not remembering, side effects, etc), ways to improve, overadherence? Remind patient of ways to improve/maintain adherence    Confirmed dose of Tecfidera 240mg by mouth twice a day. Refill processed at [facility] and will be shipped to pt.      Side Effects/Adverse Reactions       patient on potential side effects, s/s, ways to manage, when to call MD/seek help       Determine if patient experiencing side effects, ways to manage  Patient denies adverse effects.      Miscellaneous      Food interactions, DDIs, financial issues Determine if patient started any new medications (analyze for DDI) Pt completed repeat CBC w/diff on 2021. Discussed with APRN and will continue to closely monitor and recheck in 2 months.      Additional Notes: Discussed aforementioned material with patient by phone. All questions and concerns addressed. Patient provided with my contact information and instructed to call if additional questions  should arise. Notified [facility] retail of refill request.

## 2021-02-03 NOTE — TELEPHONE ENCOUNTER
----- Message from QUINTON Melgar sent at 2/3/2021 10:17 AM EST -----  Please let her know that her labs are still ok at this point but I want to keep a close watch on them so we will recheck her again in 2 months.     Thanks!

## 2021-02-03 NOTE — TELEPHONE ENCOUNTER
Relayed results to Odalis who stated understanding.     She had also asked if Cheryl will be mailing her Tecfidera?

## 2021-03-03 ENCOUNTER — SPECIALTY PHARMACY (OUTPATIENT)
Dept: ONCOLOGY | Facility: HOSPITAL | Age: 71
End: 2021-03-03

## 2021-03-03 NOTE — PROGRESS NOTES
Oral Neurology Medication Follow-Up        Patient Name/:     Odalis Mir   1950  Oral Neurology Medication Regimen:  Tecfidera DR 120mg PO BID x 7 days, then 240mg PO BID thereafter           Initial Teaching Follow Up Comments      Safety      Storage instructions (away from children; away from heat/cold, sunlight, or moisture)       “How are you storing your medications?”, reminders on storage, proper handling (away from children, managing waste, etc.), disposal of medication with D/C or dosage change     Patient reports appropriate storage and handling.      Adherence       patient and/or caregiver on how to take medication, take with/without food, assess their adherence potential, stress importance of adherence, ways to manage adherence (pill boxes, phone reminders, calendars), what to do if miss a dose    “How are you taking your medication?” “How are you remembering to take your medication?”, “How many doses have you missed?”, determine reasons for non-adherence (not remembering, side effects, etc), ways to improve, overadherence? Remind patient of ways to improve/maintain adherence    Confirmed dose of Tecfidera 240mg by mouth twice a day. Refill processed at [facility] retail and will be shipped to pt.      Side Effects/Adverse Reactions       patient on potential side effects, s/s, ways to manage, when to call MD/seek help       Determine if patient experiencing side effects, ways to manage  Patient denies adverse effects.      Miscellaneous      Food interactions, DDIs, financial issues Determine if patient started any new medications (analyze for DDI) Pt completed repeat CBC w/diff on 2021. Discussed with APRN and will continue to closely monitor and recheck in 2 months.      Additional Notes: Discussed aforementioned material with patient by phone. All questions and concerns addressed. Patient provided with my contact information and instructed to call if additional questions  should arise. Notified [facility] retail of refill request.

## 2021-03-29 ENCOUNTER — SPECIALTY PHARMACY (OUTPATIENT)
Dept: ONCOLOGY | Facility: HOSPITAL | Age: 71
End: 2021-03-29

## 2021-03-29 NOTE — PROGRESS NOTES
Oral Neurology Medication Follow-Up        Patient Name/:     Odalis Mir   1950  Oral Neurology Medication Regimen:  Tecfidera DR 120mg PO BID x 7 days, then 240mg PO BID thereafter           Initial Teaching Follow Up Comments      Safety      Storage instructions (away from children; away from heat/cold, sunlight, or moisture)       “How are you storing your medications?”, reminders on storage, proper handling (away from children, managing waste, etc.), disposal of medication with D/C or dosage change     Patient reports appropriate storage and handling.      Adherence       patient and/or caregiver on how to take medication, take with/without food, assess their adherence potential, stress importance of adherence, ways to manage adherence (pill boxes, phone reminders, calendars), what to do if miss a dose    “How are you taking your medication?” “How are you remembering to take your medication?”, “How many doses have you missed?”, determine reasons for non-adherence (not remembering, side effects, etc), ways to improve, overadherence? Remind patient of ways to improve/maintain adherence    Confirmed dose of Tecfidera 240mg by mouth twice a day. Refill processed at [facility] and will be shipped to pt.      Side Effects/Adverse Reactions       patient on potential side effects, s/s, ways to manage, when to call MD/seek help       Determine if patient experiencing side effects, ways to manage  Patient denies adverse effects.      Miscellaneous      Food interactions, DDIs, financial issues Determine if patient started any new medications (analyze for DDI) Pt completed repeat CBC w/diff on 2021. Discussed with APRN and will continue to closely monitor and recheck in 2 months. Reminded patient to have repeat labs completed.      Additional Notes: Discussed aforementioned material with patient by phone. All questions and concerns addressed. Patient provided with my contact information  and instructed to call if additional questions should arise. Notified [facility] of refill request.

## 2021-04-01 ENCOUNTER — LAB (OUTPATIENT)
Dept: LAB | Facility: HOSPITAL | Age: 71
End: 2021-04-01

## 2021-04-01 DIAGNOSIS — G35 MULTIPLE SCLEROSIS (HCC): ICD-10-CM

## 2021-04-01 LAB
BASOPHILS # BLD AUTO: 0.04 10*3/MM3 (ref 0–0.2)
BASOPHILS NFR BLD AUTO: 0.6 % (ref 0–1.5)
DEPRECATED RDW RBC AUTO: 41.4 FL (ref 37–54)
EOSINOPHIL # BLD AUTO: 0.08 10*3/MM3 (ref 0–0.4)
EOSINOPHIL NFR BLD AUTO: 1.1 % (ref 0.3–6.2)
ERYTHROCYTE [DISTWIDTH] IN BLOOD BY AUTOMATED COUNT: 12.8 % (ref 12.3–15.4)
HCT VFR BLD AUTO: 37.3 % (ref 34–46.6)
HGB BLD-MCNC: 12.2 G/DL (ref 12–15.9)
IMM GRANULOCYTES # BLD AUTO: 0.02 10*3/MM3 (ref 0–0.05)
IMM GRANULOCYTES NFR BLD AUTO: 0.3 % (ref 0–0.5)
LYMPHOCYTES # BLD AUTO: 0.67 10*3/MM3 (ref 0.7–3.1)
LYMPHOCYTES NFR BLD AUTO: 9.3 % (ref 19.6–45.3)
MCH RBC QN AUTO: 28.9 PG (ref 26.6–33)
MCHC RBC AUTO-ENTMCNC: 32.7 G/DL (ref 31.5–35.7)
MCV RBC AUTO: 88.4 FL (ref 79–97)
MONOCYTES # BLD AUTO: 0.49 10*3/MM3 (ref 0.1–0.9)
MONOCYTES NFR BLD AUTO: 6.8 % (ref 5–12)
NEUTROPHILS NFR BLD AUTO: 5.87 10*3/MM3 (ref 1.7–7)
NEUTROPHILS NFR BLD AUTO: 81.9 % (ref 42.7–76)
NRBC BLD AUTO-RTO: 0 /100 WBC (ref 0–0.2)
PLATELET # BLD AUTO: 252 10*3/MM3 (ref 140–450)
PMV BLD AUTO: 10.1 FL (ref 6–12)
RBC # BLD AUTO: 4.22 10*6/MM3 (ref 3.77–5.28)
WBC # BLD AUTO: 7.17 10*3/MM3 (ref 3.4–10.8)

## 2021-04-01 PROCEDURE — 36415 COLL VENOUS BLD VENIPUNCTURE: CPT

## 2021-04-01 PROCEDURE — 85025 COMPLETE CBC W/AUTO DIFF WBC: CPT

## 2021-04-02 ENCOUNTER — TELEPHONE (OUTPATIENT)
Dept: NEUROLOGY | Facility: CLINIC | Age: 71
End: 2021-04-02

## 2021-04-02 NOTE — TELEPHONE ENCOUNTER
----- Message from QUINTON Melgar sent at 4/2/2021  8:34 AM EDT -----  Please let Odalis know her absolute lymphocyte count has come back up and she can continue on her Tecfidera. I recommend she keep her F/U in June.   Thanks!

## 2021-04-15 DIAGNOSIS — F33.1 MODERATE EPISODE OF RECURRENT MAJOR DEPRESSIVE DISORDER (HCC): ICD-10-CM

## 2021-04-15 RX ORDER — ARIPIPRAZOLE 10 MG/1
10 TABLET ORAL DAILY
Qty: 30 TABLET | Refills: 5 | Status: SHIPPED | OUTPATIENT
Start: 2021-04-15 | End: 2021-10-11

## 2021-04-28 ENCOUNTER — SPECIALTY PHARMACY (OUTPATIENT)
Dept: ONCOLOGY | Facility: HOSPITAL | Age: 71
End: 2021-04-28

## 2021-04-28 NOTE — PROGRESS NOTES
Oral Neurology Medication Follow-Up        Patient Name/:     Odalis Mir   1950  Oral Neurology Medication Regimen:  Tecfidera DR 120mg PO BID x 7 days, then 240mg PO BID thereafter           Initial Teaching Follow Up Comments      Safety      Storage instructions (away from children; away from heat/cold, sunlight, or moisture)       “How are you storing your medications?”, reminders on storage, proper handling (away from children, managing waste, etc.), disposal of medication with D/C or dosage change     Patient reports appropriate storage and handling.      Adherence       patient and/or caregiver on how to take medication, take with/without food, assess their adherence potential, stress importance of adherence, ways to manage adherence (pill boxes, phone reminders, calendars), what to do if miss a dose    “How are you taking your medication?” “How are you remembering to take your medication?”, “How many doses have you missed?”, determine reasons for non-adherence (not remembering, side effects, etc), ways to improve, overadherence? Remind patient of ways to improve/maintain adherence    Confirmed dose of Tecfidera 240mg by mouth twice a day. Refill processed at [facility] and will be shipped to pt.      Side Effects/Adverse Reactions       patient on potential side effects, s/s, ways to manage, when to call MD/seek help       Determine if patient experiencing side effects, ways to manage  Patient denies adverse effects.      Miscellaneous      Food interactions, DDIs, financial issues Determine if patient started any new medications (analyze for DDI)       Additional Notes: Discussed aforementioned material with patient by phone. All questions and concerns addressed. Patient provided with my contact information and instructed to call if additional questions should arise. Notified [facility] of refill request.

## 2021-06-07 ENCOUNTER — SPECIALTY PHARMACY (OUTPATIENT)
Dept: ONCOLOGY | Facility: HOSPITAL | Age: 71
End: 2021-06-07

## 2021-06-07 NOTE — PROGRESS NOTES
Oral Neurology Medication Follow-Up        Patient Name/:     Odalis Mir   1950  Oral Neurology Medication Regimen:  Tecfidera DR 120mg PO BID x 7 days, then 240mg PO BID thereafter           Initial Teaching Follow Up Comments      Safety      Storage instructions (away from children; away from heat/cold, sunlight, or moisture)       “How are you storing your medications?”, reminders on storage, proper handling (away from children, managing waste, etc.), disposal of medication with D/C or dosage change     Patient reports appropriate storage and handling.      Adherence       patient and/or caregiver on how to take medication, take with/without food, assess their adherence potential, stress importance of adherence, ways to manage adherence (pill boxes, phone reminders, calendars), what to do if miss a dose    “How are you taking your medication?” “How are you remembering to take your medication?”, “How many doses have you missed?”, determine reasons for non-adherence (not remembering, side effects, etc), ways to improve, overadherence? Remind patient of ways to improve/maintain adherence    Confirmed dose of Tecfidera 240mg by mouth twice a day. Refill processed at [facility] and will be shipped to pt.      Side Effects/Adverse Reactions       patient on potential side effects, s/s, ways to manage, when to call MD/seek help       Determine if patient experiencing side effects, ways to manage  Patient denies adverse effects.      Miscellaneous      Food interactions, DDIs, financial issues Determine if patient started any new medications (analyze for DDI)       Additional Notes: Discussed aforementioned material with patient by phone. All questions and concerns addressed. Patient provided with my contact information and instructed to call if additional questions should arise. Notified [facility] of refill request.

## 2021-06-14 RX ORDER — CARBAMAZEPINE 200 MG/1
TABLET ORAL
Qty: 450 TABLET | Refills: 2 | Status: SHIPPED | OUTPATIENT
Start: 2021-06-14 | End: 2022-03-07

## 2021-07-22 ENCOUNTER — SPECIALTY PHARMACY (OUTPATIENT)
Dept: ONCOLOGY | Facility: HOSPITAL | Age: 71
End: 2021-07-22

## 2021-07-22 NOTE — PROGRESS NOTES
Oral Neurology Medication Follow-Up        Patient Name/:     Odalis Mir   1950  Oral Neurology Medication Regimen:  Tecfidera DR 120mg PO BID x 7 days, then 240mg PO BID thereafter           Initial Teaching Follow Up Comments      Safety      Storage instructions (away from children; away from heat/cold, sunlight, or moisture)       “How are you storing your medications?”, reminders on storage, proper handling (away from children, managing waste, etc.), disposal of medication with D/C or dosage change     Patient reports appropriate storage and handling.      Adherence       patient and/or caregiver on how to take medication, take with/without food, assess their adherence potential, stress importance of adherence, ways to manage adherence (pill boxes, phone reminders, calendars), what to do if miss a dose    “How are you taking your medication?” “How are you remembering to take your medication?”, “How many doses have you missed?”, determine reasons for non-adherence (not remembering, side effects, etc), ways to improve, overadherence? Remind patient of ways to improve/maintain adherence    Confirmed dose of Tecfidera 240mg by mouth twice a day. Refill processed at [facility] and will be shipped to pt.      Side Effects/Adverse Reactions       patient on potential side effects, s/s, ways to manage, when to call MD/seek help       Determine if patient experiencing side effects, ways to manage  Patient denies adverse effects.      Miscellaneous      Food interactions, DDIs, financial issues Determine if patient started any new medications (analyze for DDI)       Additional Notes: Discussed aforementioned material with patient by phone. All questions and concerns addressed. Patient provided with my contact information and instructed to call if additional questions should arise. Notified [facility] of refill request.

## 2021-08-19 ENCOUNTER — SPECIALTY PHARMACY (OUTPATIENT)
Dept: ONCOLOGY | Facility: HOSPITAL | Age: 71
End: 2021-08-19

## 2021-08-19 NOTE — PROGRESS NOTES
Oral Neurology Medication Follow-Up        Patient Name/:     Odalis Mir   1950  Oral Neurology Medication Regimen:  Tecfidera DR 120mg PO BID x 7 days, then 240mg PO BID thereafter           Initial Teaching Follow Up Comments      Safety      Storage instructions (away from children; away from heat/cold, sunlight, or moisture)       “How are you storing your medications?”, reminders on storage, proper handling (away from children, managing waste, etc.), disposal of medication with D/C or dosage change     Patient reports appropriate storage and handling.      Adherence       patient and/or caregiver on how to take medication, take with/without food, assess their adherence potential, stress importance of adherence, ways to manage adherence (pill boxes, phone reminders, calendars), what to do if miss a dose    “How are you taking your medication?” “How are you remembering to take your medication?”, “How many doses have you missed?”, determine reasons for non-adherence (not remembering, side effects, etc), ways to improve, overadherence? Remind patient of ways to improve/maintain adherence    Confirmed dose of Tecfidera 240mg by mouth twice a day. Refill processed at [facility] and will be shipped to pt.      Side Effects/Adverse Reactions       patient on potential side effects, s/s, ways to manage, when to call MD/seek help       Determine if patient experiencing side effects, ways to manage  Patient denies adverse effects.      Miscellaneous      Food interactions, DDIs, financial issues Determine if patient started any new medications (analyze for DDI)       Additional Notes: Discussed aforementioned material with patient by phone. All questions and concerns addressed. Patient provided with my contact information and instructed to call if additional questions should arise. Notified [facility] of refill request.

## 2021-09-14 ENCOUNTER — SPECIALTY PHARMACY (OUTPATIENT)
Dept: ONCOLOGY | Facility: HOSPITAL | Age: 71
End: 2021-09-14

## 2021-09-14 NOTE — PROGRESS NOTES
Oral Neurology Medication Follow-Up        Patient Name/:     Odalis Mir   1950  Oral Neurology Medication Regimen:  Tecfidera DR 120mg PO BID x 7 days, then 240mg PO BID thereafter           Initial Teaching Follow Up Comments      Safety      Storage instructions (away from children; away from heat/cold, sunlight, or moisture)       “How are you storing your medications?”, reminders on storage, proper handling (away from children, managing waste, etc.), disposal of medication with D/C or dosage change     Patient reports appropriate storage and handling.      Adherence       patient and/or caregiver on how to take medication, take with/without food, assess their adherence potential, stress importance of adherence, ways to manage adherence (pill boxes, phone reminders, calendars), what to do if miss a dose    “How are you taking your medication?” “How are you remembering to take your medication?”, “How many doses have you missed?”, determine reasons for non-adherence (not remembering, side effects, etc), ways to improve, overadherence? Remind patient of ways to improve/maintain adherence    Confirmed dose of Tecfidera 240mg by mouth twice a day. Refill processed at [facility] and will be shipped to pt.      Side Effects/Adverse Reactions       patient on potential side effects, s/s, ways to manage, when to call MD/seek help       Determine if patient experiencing side effects, ways to manage  Patient denies adverse effects.      Miscellaneous      Food interactions, DDIs, financial issues Determine if patient started any new medications (analyze for DDI)       Additional Notes: Discussed aforementioned material with patient by phone. All questions and concerns addressed. Patient provided with my contact information and instructed to call if additional questions should arise. Notified [facility] [employer] of refill request.

## 2021-10-09 DIAGNOSIS — F33.1 MODERATE EPISODE OF RECURRENT MAJOR DEPRESSIVE DISORDER (HCC): ICD-10-CM

## 2021-10-11 RX ORDER — ARIPIPRAZOLE 10 MG/1
10 TABLET ORAL DAILY
Qty: 30 TABLET | Refills: 5 | Status: SHIPPED | OUTPATIENT
Start: 2021-10-11 | End: 2022-04-06

## 2021-10-11 NOTE — TELEPHONE ENCOUNTER
[facility] refill:     Last filled: 04/15/2021 with 5 additional refills    Last appt: 12/09/2020  Next appt: 12/08/2021

## 2021-10-14 ENCOUNTER — SPECIALTY PHARMACY (OUTPATIENT)
Dept: ONCOLOGY | Facility: HOSPITAL | Age: 71
End: 2021-10-14

## 2021-10-14 RX ORDER — DIMETHYL FUMARATE 240 MG/1
240 CAPSULE ORAL 2 TIMES DAILY
Qty: 60 CAPSULE | Refills: 11 | Status: SHIPPED | OUTPATIENT
Start: 2021-10-14 | End: 2022-09-21 | Stop reason: SDUPTHER

## 2021-10-14 NOTE — PROGRESS NOTES
Oral Neurology Medication Follow-Up        Patient Name/:     Odalis Mir   1950  Oral Neurology Medication Regimen:  Tecfidera DR 120mg PO BID x 7 days, then 240mg PO BID thereafter           Initial Teaching Follow Up Comments      Safety      Storage instructions (away from children; away from heat/cold, sunlight, or moisture)       “How are you storing your medications?”, reminders on storage, proper handling (away from children, managing waste, etc.), disposal of medication with D/C or dosage change     Patient reports appropriate storage and handling.      Adherence       patient and/or caregiver on how to take medication, take with/without food, assess their adherence potential, stress importance of adherence, ways to manage adherence (pill boxes, phone reminders, calendars), what to do if miss a dose    “How are you taking your medication?” “How are you remembering to take your medication?”, “How many doses have you missed?”, determine reasons for non-adherence (not remembering, side effects, etc), ways to improve, overadherence? Remind patient of ways to improve/maintain adherence    Confirmed dose of Tecfidera 240mg by mouth twice a day. Refill processed at [facility] and will be shipped to . New script submitted as previous .      Side Effects/Adverse Reactions       patient on potential side effects, s/s, ways to manage, when to call MD/seek help       Determine if patient experiencing side effects, ways to manage  Patient denies adverse effects.      Miscellaneous      Food interactions, DDIs, financial issues Determine if patient started any new medications (analyze for DDI)       Additional Notes: Discussed aforementioned material with patient by phone. All questions and concerns addressed. Patient provided with my contact information and instructed to call if additional questions should arise. Notified [facility] of refill request.

## 2021-11-09 ENCOUNTER — SPECIALTY PHARMACY (OUTPATIENT)
Dept: ONCOLOGY | Facility: HOSPITAL | Age: 71
End: 2021-11-09

## 2021-11-09 NOTE — PROGRESS NOTES
Specialty Pharmacy Refill Coordination Note     Odalis is a 71 y.o. female contacted today regarding refills of Dimethyl Fumarate 240mg. Patient has 1 week left (~14 capsules).  Reviewed and verified with patient: Yes  Specialty medication(s) and dose(s) confirmed: yes    Refill Questions      Most Recent Value   Changes to allergies? No   Changes to medications? No   New conditions since last clinic visit No   Unplanned office visit, urgent care, ED, or hospital admission in the last 4 weeks  No   How does patient/caregiver feel medication is working? Excellent   Financial problems or insurance changes  No   How many doses of your specialty medications were missed in the last 4 weeks? 0          Delivery Questions      Most Recent Value   Delivery method [employer]   Delivery address correct? Yes   Delivery phone number [phone]   Preferred delivery time? Anytime   Number of medications in delivery 1   Medication being filled and delivered Dimethyl Fumarate 240mg   Doses left of specialty medications 14 capsules, 7 days.   Is there any medication that is due not being filled? No   Supplies needed? No supplies needed   Cooler needed? No   Do any medications need mixed or dated? No   Copay form of payment Credit card on file   Questions or concerns for the pharmacist? No   Are any medications first time fills? No            Medication Adherence    Any gaps in refill history greater than 2 weeks in the last 3 months: no  Demonstrates understanding of importance of adherence: yes  Informant: patient  Reliability of informant: reliable  Provider-estimated medication adherence level: good          Follow-up: 28 days for refill.   Renata Ordaz, Pharmacy Technician  Specialty Pharmacy Technician

## 2021-12-08 ENCOUNTER — SPECIALTY PHARMACY (OUTPATIENT)
Dept: ONCOLOGY | Facility: HOSPITAL | Age: 71
End: 2021-12-08

## 2021-12-08 NOTE — PROGRESS NOTES
Specialty Pharmacy Refill Coordination Note     Odalis is a 71 y.o. female contacted today regarding refills of  Tecfidera specialty medication(s).    Reviewed and verified with patient: Yes  Specialty medication(s) and dose(s) confirmed: yes    Refill Questions      Most Recent Value   Changes to allergies? No   Changes to medications? No   New conditions since last clinic visit No   Unplanned office visit, urgent care, ED, or hospital admission in the last 4 weeks  No   How does patient/caregiver feel medication is working? Excellent   Financial problems or insurance changes  No   How many doses of your specialty medications were missed in the last 4 weeks? 0          Delivery Questions      Most Recent Value   Delivery method [employer]   Delivery address correct? Yes   Preferred delivery time? Anytime   Number of medications in delivery 1   Medication being filled and delivered Tecfidera   Doses left of specialty medications Unsure   Is there any medication that is due not being filled? No   Cooler needed? No   Do any medications need mixed or dated? No   Copay form of payment Payment plan already set up   Questions or concerns for the pharmacist? No   Are any medications first time fills? No            Medication Adherence    Any gaps in refill history greater than 2 weeks in the last 3 months: no  Demonstrates understanding of importance of adherence: yes  Informant: patient  Reliability of informant: reliable  Provider-estimated medication adherence level: %  Reasons for non-adherence: no problems identified  Support network for adherence: healthcare provider   Other support network: Pharmacy           Follow-up: Plan to reach out to patient in 1 month     Verona Levine, Pharmacy Technician  Specialty Pharmacy Technician

## 2022-01-05 ENCOUNTER — SPECIALTY PHARMACY (OUTPATIENT)
Dept: ONCOLOGY | Facility: HOSPITAL | Age: 72
End: 2022-01-05

## 2022-01-05 NOTE — PROGRESS NOTES
Specialty Pharmacy Refill Coordination Note     Odalis is a 71 y.o. female contacted today regarding refills of Dimethyl Fumarate specialty medication(s).    Reviewed and verified with patient: Yes  Specialty medication(s) and dose(s) confirmed: yes    Refill Questions      Most Recent Value   Changes to allergies? No   Changes to medications? No   New conditions since last clinic visit No   Unplanned office visit, urgent care, ED, or hospital admission in the last 4 weeks  No   How does patient/caregiver feel medication is working? Excellent   Financial problems or insurance changes  No   How many doses of your specialty medications were missed in the last 4 weeks? 0   Does this patient require a clinical escalation to a pharmacist? No          Delivery Questions      Most Recent Value   Delivery method [employer]   Delivery address correct? Yes   Preferred delivery time? Anytime   Number of medications in delivery 1   Medication being filled and delivered Dimethyl Fumarate 240mg   Doses left of specialty medications 1 week   Is there any medication that is due not being filled? No   Supplies needed? No supplies needed   Cooler needed? No   Do any medications need mixed or dated? No   Copay form of payment Credit card on file   Questions or concerns for the pharmacist? No   Are any medications first time fills? No            Medication Adherence    Support network for adherence: healthcare provider   Other support network: Pharmacy           Follow-up: 28 days     Renata Ordaz, Pharmacy Technician  Specialty Pharmacy Technician

## 2022-01-06 NOTE — PROGRESS NOTES
Specialty Pharmacy Patient Management Program  Neurology Initial Assessment       Odalis Mir is a 71 y.o. female with MS seen by a Neurology provider and enrolled in the Neurology Patient Management program offered by [facility].  An initial outreach was conducted, including assessment of therapy appropriateness and specialty medication education for Tecfidera. The patient was introduced to services offered by [facility], including: regular assessments, refill coordination, curbside pick-up or mail order delivery options, prior authorization maintenance, and financial assistance programs as applicable. The patient was also provided with contact information for the pharmacy team.     Insurance Coverage & Financial Support  Medicare/Medicaid     Relevant Past Medical History and Comorbidities  Relevant medical history and concomitant health conditions were discussed with the patient. The patient's chart has been reviewed for relevant past medical history and comorbid health conditions and updated as necessary.   Past Medical History:   Diagnosis Date   • Anxiety and depression    • Elevated cholesterol    • GERD (gastroesophageal reflux disease)    • Hypertension    • Multiple sclerosis (CMS/HCC)    • Osteoarthritis    • Seizure (CMS/[facility])      Social History     Socioeconomic History   • Marital status: Single   Tobacco Use   • Smoking status: Never Smoker   • Smokeless tobacco: Never Used   Substance and Sexual Activity   • Alcohol use: No   • Drug use: No   • Sexual activity: Defer       Allergies  Known allergies and reactions were discussed with the patient. The patient's chart has been reviewed for  allergy information and updated as necessary.   Shellfish-derived products and Sulfa antibiotics    Current Medication List  This medication list has been reviewed with the patient and evaluated for any interactions or necessary modifications/recommendations,  and updated to include all prescription medications, OTC medications, and supplements the patient is currently taking.  This list reflects what is contained in the patient's profile, which has also been marked as reviewed to communicate to other providers it is the most up to date version of the patient's current medication therapy.     Current Outpatient Medications:   •  alendronate (FOSAMAX) 70 MG tablet, Take 70 mg by mouth Every 7 (Seven) Days. Takes on MON, Disp: , Rfl:   •  ALPRAZolam (XANAX) 1 MG tablet, Take 1.5 mg by mouth 2 (Two) Times a Day As Needed for Anxiety., Disp: , Rfl:   •  amLODIPine (NORVASC) 10 MG tablet, Take  by mouth Daily., Disp: , Rfl:   •  ARIPiprazole (ABILIFY) 10 MG tablet, TAKE 1 TABLET BY MOUTH DAILY, Disp: 30 tablet, Rfl: 5  •  aspirin 81 MG chewable tablet, Chew 81 mg Daily., Disp: , Rfl:   •  atorvastatin (LIPITOR) 40 MG tablet, Take 40 mg by mouth Every Night., Disp: , Rfl:   •  carBAMazepine (TEGretol) 200 MG tablet, TAKE 2 TABLETS BY MOUTH EVERY MORNING, 1 TABLET AT MIDDAY, AND 2 TABLETS EVERY NIGHT AT BEDTIME, Disp: 450 tablet, Rfl: 2  •  Carboxymethylcellulose Sodium (STERILE LUBRICANT OP), Apply 1 dose to eye(s) as directed by provider As Needed., Disp: , Rfl:   •  carvedilol (COREG) 25 MG tablet, Take 25 mg by mouth Daily., Disp: , Rfl:   •  cetirizine (zyrTEC) 10 MG tablet, Take 10 mg by mouth Daily., Disp: , Rfl:   •  Cholecalciferol (VITAMIN D3 PO), Take 1,000 Units by mouth Daily., Disp: , Rfl:   •  Dimethyl Fumarate (Tecfidera) capsule delayed-release, Take 1 capsule by mouth 2 (Two) Times a Day., Disp: 60 capsule, Rfl: 11  •  FLUoxetine (PROzac) 60 MG tablet, Take  by mouth Daily., Disp: , Rfl:   •  fluticasone (VERAMYST) 27.5 MCG/SPRAY nasal spray, 2 sprays into the nostril(s) as directed by provider Daily., Disp: , Rfl:   •  gabapentin (NEURONTIN) 300 MG capsule, , Disp: , Rfl:   •  hydrALAZINE (APRESOLINE) 25 MG tablet, , Disp: , Rfl:   •  HYDROcodone-acetaminophen  (NORCO)  MG per tablet, Take 1 tablet by mouth Every 6 (Six) Hours As Needed for Moderate Pain . Every 6-8 hours prn, Disp: , Rfl:   •  hydrOXYzine (ATARAX) 25 MG tablet, TK 1 T PO TID PRA, Disp: , Rfl:   •  loratadine (CLARITIN) 10 MG tablet, Take 10 mg by mouth Daily., Disp: , Rfl:   •  montelukast (SINGULAIR) 10 MG tablet, TK 1 T PO QHS, Disp: , Rfl:   •  Multiple Vitamins-Minerals (CENTRUM SILVER PO), Take 1 tablet by mouth Daily., Disp: , Rfl:   •  nitroglycerin (NITROSTAT) 0.4 MG SL tablet, Place 0.4 mg under the tongue Every 5 (Five) Minutes As Needed for Chest Pain. Take no more than 3 doses in 15 minutes., Disp: , Rfl:   •  omeprazole (priLOSEC) 40 MG capsule, TAKE 1 CAPSULE BY MOUTH DAILY 30 MINUTES BEFORE MEALS, Disp: , Rfl:   •  polyethylene glycol (MIRALAX) packet, Take 17 g by mouth As Needed (constipation)., Disp: , Rfl:   •  spironolactone (ALDACTONE) 25 MG tablet, TAKE 3 TS PO D, Disp: , Rfl:   •  valsartan-hydrochlorothiazide (DIOVAN-HCT) 320-25 MG per tablet, Take 1 tablet by mouth Daily., Disp: , Rfl:   •  vitamin C (ASCORBIC ACID) 500 MG tablet, Take 500 mg by mouth Daily., Disp: , Rfl:     Drug Interactions  none     Recommended Medications Assessment: none      Relevant Laboratory Values    Labs every 6 months - Pt to schedule appt and have labs done at that time    Initial Education Provided for Specialty Medication  The patient has been provided with the following education and any applicable administration techniques (i.e. self-injection) have been demonstrated for the therapies indicated. All questions and concerns have been addressed prior to the patient receiving the medication, and the patient has verbalized understanding of the education and any materials provided.  Additional patient education shall be provided and documented upon request by the patient, provider or payer.      Tecfidera (dimethyl fumarate)   Medication Expectations   Why am I taking this medication? Tecfidera  is FDA approved to treat multiple sclerosis, relapsing forms.   What should I expect while on this medication? A reduction in the severity and frequency of relapses.   How does the medication work? The exact mechanism of action is unknown but it dampens down inflammation. This may be helpful in reducing the inflammation that causes damage in the brain and spinal cord of people with MS.   How long will I be on this medication for? The amount of time you will be on this medication will be determined by your doctor based on how your MS progresses.   How do I take this medication? Take as directed on your prescription label. Initial, 120 mg orally twice daily for 7 days, then increase to 240 mg twice daily. Patient experiencing flushing that taking drug with food or premedicating with non-enteric coated aspirin may help. Capsules may be administered with or without food; may be given with food to reduce the incidence of flushing. Capsules should be swallowed whole and not chewed, crushed, or opened .   What are some possible side effects? Side effects may include but not limited to abdominal pain, diarrhea, nausea, vomiting, pruritus, and rash.    What happens if I miss a dose? Take the medicine as soon as you can, but skip the missed dose if it is almost time for your next dose. Do not take two doses at one time.     Medication Safety   What are things I should warn my doctor immediately about? Allergic reaction: Itching or hives, swelling in your face or hands, swelling or tingling in your mouth or throat, chest tightness, trouble breathing. Chest pain, trouble breathing. Dark urine or pale stools, nausea, vomiting, loss of appetite, stomach pain, yellow skin or eyes, Fever or chills, cough, sore throat, body aches.   What are things that I should be cautious of? Tell your doctor if you are pregnant or breastfeeding, or if you have any type of infection (including herpes zoster). This medicine may cause the following  problems: May increase risk for infection (including progressive multifocal leukoencephalopathy) and Liver damage.   What are some medications that can interact with this one? Some products that may interact with this drug include: other drugs that weaken the immune system/increase the risk of infection (such as natalizumab, rituximab).  Check with your pharmacist before starting any new medications.     Medication Storage/Handling   How should I handle this medication? Keep out of reach of pets and children.   How does this medication need to be stored? Store between 15 and 30 degrees C (59 and 86 degrees F) protected from light, heat and moisture. Discard opened bottles after 90 days    How should I dispose of this medication? There should not be a need to dispose of this medication unless your provider decides to change the dose or therapy. If that is the case, take to your local police station for proper disposal. Some pharmacies also have take-back bins for medication drop-off.      Resources/Support   How can I remind myself to take this medication? You can download reminder apps to help you manage your refills. You may also set an alarm on your phone to remind you. The pharmacy carries pill boxes that you can place next to an area you pass everyday (such as where you place your car keys or where you charge your phone)   Is financial support available?  If using brand name [facility] may be able [employer] co-pay cards if you have commercial insurance or patient assistance if you have Medicare or no insurance.    Which vaccines are recommended for me? Talk to your doctor about these vaccines: Flu, Coronavirus (COVID-19), Pneumococcal (pneumonia), Tdap, Hepatitis B, Zoster (shingles)          Adherence and Self-Administration  • Barriers to Patient Adherence and/or Self-Administration: none   • Methods for Supporting Patient Adherence and/or Self-Administration: none required    Goals of Therapy  • Patient Goals of  Therapy: Symptom management  • Clinical Goals or Therapeutic Targets, If Applicable: reduction of frequency and severity of replases      Quality of Life Assessment   The patient's current (pre-therapy) quality of life was discussed with the patient. The QOL segment of this outreach has been reviewed and updated.   • Quality of Life Score: 5  • The patient does not feel the Tecfidera is working anymore, and inquired about Ocrevus treatment. I referred to make an appointment with the provider to discuss treatment options and that is needs lab work done. She has missed several provider appointments in the past year.    Reassessment Plan & Follow-Up  1. Pharmacist to perform regular reassessments no more than (6) months from the previous assessment.  2. Welcome information and patient satisfaction survey to be sent by retail team with patient's initial fill.  3. Care Coordinator to set up future refill outreaches, coordinate prescription delivery, and escalate clinical questions to pharmacist.     Additional Plans, Therapy Recommendations or Therapy Problems to Be Addressed: Plan is to follow up in 30 days to see if an appointment was made with provider.   Recent Provider Changes: none    Attestation  I attest that the initiated specialty medication(s) is appropriate for the patient based on my assessment.  If the prescribed therapy is at any point deemed not appropriate based on the current or future assessments, a consultation will be initiated with the patient's specialty care provider to determine the best course of action. The revised plan of therapy will be documented along with any additional patient education provided.     Elvia Mallory, PharmD  1/6/2022  15:14 EST

## 2022-02-01 ENCOUNTER — SPECIALTY PHARMACY (OUTPATIENT)
Dept: ONCOLOGY | Facility: HOSPITAL | Age: 72
End: 2022-02-01

## 2022-02-01 NOTE — PROGRESS NOTES
Specialty Pharmacy Refill Coordination Note     Odalis is a 71 y.o. female contacted today regarding refills of Dimethyl Fumarate specialty medication(s).    Reviewed and verified with patient: Yes  Specialty medication(s) and dose(s) confirmed: yes    Refill Questions      Most Recent Value   Changes to allergies? No   Changes to medications? No   New conditions since last clinic visit No   Unplanned office visit, urgent care, ED, or hospital admission in the last 4 weeks  No   How does patient/caregiver feel medication is working? Excellent   Financial problems or insurance changes  No   If yes, describe changes in insurance or financial issues. N/A   How many doses of your specialty medications were missed in the last 4 weeks? None   Why were doses missed? N/A   Does this patient require a clinical escalation to a pharmacist? No          Delivery Questions      Most Recent Value   Delivery method FedEx   Delivery address correct? Yes   Preferred delivery time? Anytime   Number of medications in delivery 1   Medication being filled and delivered Dimethyl Fumarate   Doses left of specialty medications 6 days   Is there any medication that is due not being filled? No   Supplies needed? No supplies needed   Cooler needed? No   Do any medications need mixed or dated? No   Copay form of payment Credit card on file   Questions or concerns for the pharmacist? No   Are any medications first time fills? No            Medication Adherence    Support network for adherence: healthcare provider   Other support network: Pharmacy           Follow-up: 28 days.     Renata Ordaz, Pharmacy Technician  Specialty Pharmacy Technician

## 2022-02-10 ENCOUNTER — TELEPHONE (OUTPATIENT)
Dept: NEUROLOGY | Facility: CLINIC | Age: 72
End: 2022-02-10

## 2022-02-10 NOTE — TELEPHONE ENCOUNTER
----- Message from Elvia Mallory, Kayden sent at 2/10/2022 12:06 PM EST -----  Regarding: Patient needs appointment  FYLIU  I spoke to patient last month during our refill outreach, and noticed that she is overdue for an appointment and labs.  She said she would schedule an appointment and have the required labs drawn then, but I don't see where she has done so.  Her last appointment was 12/9/2020.  Thank you!

## 2022-02-28 ENCOUNTER — SPECIALTY PHARMACY (OUTPATIENT)
Dept: ONCOLOGY | Facility: HOSPITAL | Age: 72
End: 2022-02-28

## 2022-03-07 RX ORDER — CARBAMAZEPINE 200 MG/1
TABLET ORAL
Qty: 450 TABLET | Refills: 2 | Status: SHIPPED | OUTPATIENT
Start: 2022-03-07 | End: 2022-12-12

## 2022-03-07 NOTE — TELEPHONE ENCOUNTER
Rx Refill Note  Requested Prescriptions     Pending Prescriptions Disp Refills   • carBAMazepine (TEGretol) 200 MG tablet [Pharmacy Med Name: CARBAMAZEPINE 200MG TABLETS] 450 tablet 2     Sig: TAKE 2 TABLETS BY MOUTH EVERY MORNING, 1 TABLET BY MOUTH AT MIDDAY, AND 2 TABLETS BY MOUTH EVERY NIGHT AT BEDTIME      Last office visit with prescribing clinician: 12/9/2020      Next office visit with prescribing clinician: 3/25/2022            Huong Goldberg MA  03/07/22, 10:46 EST   Last filled: 6/14/21, 90 day with 2 refills  Sent this in. Thanks!

## 2022-03-24 ENCOUNTER — SPECIALTY PHARMACY (OUTPATIENT)
Dept: ONCOLOGY | Facility: HOSPITAL | Age: 72
End: 2022-03-24

## 2022-03-24 NOTE — PROGRESS NOTES
Specialty Pharmacy Refill Coordination Note     Odalis is a 71 y.o. female contacted today regarding refills of Dimethyl Fumarate specialty medication(s).    Reviewed and verified with patient: Yes  Specialty medication(s) and dose(s) confirmed: yes    Refill Questions    Flowsheet Row Most Recent Value   Changes to allergies? No   Changes to medications? No   New conditions since last clinic visit No   Unplanned office visit, urgent care, ED, or hospital admission in the last 4 weeks  No   How does patient/caregiver feel medication is working? Excellent   Financial problems or insurance changes  No   If yes, describe changes in insurance or financial issues. N/A   How many doses of your specialty medications were missed in the last 4 weeks? None   Why were doses missed? N/A   Does this patient require a clinical escalation to a pharmacist? No          Delivery Questions    Flowsheet Row Most Recent Value   Delivery method FedEx   Delivery address correct? Yes   Preferred delivery time? Anytime   Number of medications in delivery 1   Medication being filled and delivered Dimethyl Fumarate   Doses left of specialty medications About 11 days.   Is there any medication that is due not being filled? No   Supplies needed? No supplies needed   Cooler needed? No   Do any medications need mixed or dated? No   Copay form of payment Credit card on file   Questions or concerns for the pharmacist? No   Are any medications first time fills? No            Medication Adherence    Adherence tools used: directed education  Support network for adherence: healthcare provider   Other support network: Pharmacy           Follow-up: 28 days.     Renata Ordaz, Pharmacy Technician  Specialty Pharmacy Technician

## 2022-04-04 ENCOUNTER — TELEPHONE (OUTPATIENT)
Dept: NEUROLOGY | Facility: CLINIC | Age: 72
End: 2022-04-04

## 2022-04-04 NOTE — TELEPHONE ENCOUNTER
Caller: Odalis Mir    Relationship: Self    Best call back number: [phone]    What was the call regarding: THIS ENCOUNTER HAS BEEN CREATED, PER [facility] PROT[facility]OL, TO MAKE THE OFFICE AWARE THAT PT CALLED TO CANCEL HER F/U APPT W/ QUINTON WRIGHT TODAY, 4/4/22. PT STATES SHE WILL CALL BACK LATER TODAY TO RESCHEDULE HER APPT.    Do you require a callback: NO    DOCUMENTING PER [facility] AS PT OPTED TO CANCEL HER F/U VISIT & NOT RESCHEDULE AT THE TIME OF CALL.

## 2022-04-06 DIAGNOSIS — F33.1 MODERATE EPISODE OF RECURRENT MAJOR DEPRESSIVE DISORDER: ICD-10-CM

## 2022-04-06 RX ORDER — ARIPIPRAZOLE 10 MG/1
10 TABLET ORAL DAILY
Qty: 30 TABLET | Refills: 5 | Status: SHIPPED | OUTPATIENT
Start: 2022-04-06

## 2022-04-06 NOTE — TELEPHONE ENCOUNTER
Rx Refill Note  Requested Prescriptions     Pending Prescriptions Disp Refills   • ARIPiprazole (ABILIFY) 10 MG tablet [Pharmacy Med Name: ARIPIPRAZOLE 10MG TABLETS] 30 tablet 5     Sig: TAKE 1 TABLET BY MOUTH DAILY      Last filled: 10/11/2021 30 with 5 refills.  Last office visit with prescribing clinician: 12/9/2020      Next office visit with prescribing clinician: Visit date not found     Sent this in.    Lois Bocanegra MA  04/06/22, 08:16 EDT

## 2022-04-26 ENCOUNTER — SPECIALTY PHARMACY (OUTPATIENT)
Dept: ONCOLOGY | Facility: HOSPITAL | Age: 72
End: 2022-04-26

## 2022-04-26 NOTE — PROGRESS NOTES
Specialty Pharmacy Refill Coordination Note     Odalis is a 71 y.o. female contacted today regarding refills of Dimethyl Fumarate specialty medication(s).    Reviewed and verified with patient: Yes  Specialty medication(s) and dose(s) confirmed: yes    Refill Questions    Flowsheet Row Most Recent Value   Changes to allergies? No   Changes to medications? No   New conditions since last clinic visit No   Unplanned office visit, urgent care, ED, or hospital admission in the last 4 weeks  No   How does patient/caregiver feel medication is working? Excellent   Financial problems or insurance changes  No   If yes, describe changes in insurance or financial issues. N/A   How many doses of your specialty medications were missed in the last 4 weeks? None   Why were doses missed? N/A   Does this patient require a clinical escalation to a pharmacist? No          Delivery Questions    Flowsheet Row Most Recent Value   Delivery method FedEx   Delivery address correct? Yes   Preferred delivery time? Anytime   Number of medications in delivery 1   Medication being filled and delivered Dimethyl Fumarate   Doses left of specialty medications About 5 days   Is there any medication that is due not being filled? No   Supplies needed? No supplies needed   Cooler needed? No   Do any medications need mixed or dated? No   Copay form of payment Credit card on file   Questions or concerns for the pharmacist? No   Are any medications first time fills? No            Medication Adherence    Adherence tools used: directed education  Support network for adherence: healthcare provider   Other support network: Pharmacy           Follow-up:  28 days.     Renata Ordaz, Pharmacy Technician  Specialty Pharmacy Technician

## 2022-05-05 ENCOUNTER — TELEPHONE (OUTPATIENT)
Dept: NEUROLOGY | Facility: CLINIC | Age: 72
End: 2022-05-05

## 2022-05-05 NOTE — TELEPHONE ENCOUNTER
----- Message from Elvia Mallory, Kayden sent at 5/5/2022  9:25 AM EDT -----  Regarding: Overdue for appointment and labs  Odalis is overdue for an appointment and labs - I left her a message on 5/3/22, but she did not return my call.  (It looks like her last visit was 12/9/20, last labs in 4/21)  Would you be able to reach out to get her scheduled?  Thank you!  Elvia

## 2022-05-24 ENCOUNTER — SPECIALTY PHARMACY (OUTPATIENT)
Dept: ONCOLOGY | Facility: HOSPITAL | Age: 72
End: 2022-05-24

## 2022-05-24 NOTE — PROGRESS NOTES
Specialty Pharmacy Refill Coordination Note     Odalis is a 71 y.o. female contacted today regarding refills of Dimethyl Fumarate specialty medication(s).    Reviewed and verified with patient: Yes  Specialty medication(s) and dose(s) confirmed: yes    Refill Questions    Flowsheet Row Most Recent Value   Changes to allergies? No   Changes to medications? No   New conditions since last clinic visit No   Unplanned office visit, urgent care, ED, or hospital admission in the last 4 weeks  No   How does patient/caregiver feel medication is working? Very good   Financial problems or insurance changes  No   If yes, describe changes in insurance or financial issues. N/A   Since the previous refill, were any specialty medication doses or scheduled injections missed or delayed?  No   If yes, please provide the amount N/A   Why were doses missed? N/A   Does this patient require a clinical escalation to a pharmacist? No          Delivery Questions    Flowsheet Row Most Recent Value   Delivery method FedEx   Delivery address correct? Yes   Preferred delivery time? Anytime   Number of medications in delivery 1   Medication being filled and delivered Dimethyl Fumarate   Doses left of specialty medications About 1 week   Is there any medication that is due not being filled? No   Supplies needed? No supplies needed   Cooler needed? No   Do any medications need mixed or dated? No   Copay form of payment Credit card on file   Questions or concerns for the pharmacist? No   Are any medications first time fills? No            Medication Adherence    Adherence tools used: directed education  Support network for adherence: healthcare provider   Other support network: Pharmacy           Follow-up:  28 days.     Renata Ordaz, Pharmacy Technician  Specialty Pharmacy Technician

## 2022-06-07 ENCOUNTER — SPECIALTY PHARMACY (OUTPATIENT)
Dept: ONCOLOGY | Facility: HOSPITAL | Age: 72
End: 2022-06-07

## 2022-06-21 ENCOUNTER — SPECIALTY PHARMACY (OUTPATIENT)
Dept: ONCOLOGY | Facility: HOSPITAL | Age: 72
End: 2022-06-21

## 2022-06-21 RX ORDER — ENOXAPARIN SODIUM 100 MG/ML
INJECTION SUBCUTANEOUS EVERY 12 HOURS SCHEDULED
COMMUNITY
Start: 2022-06-20 | End: 2022-07-20

## 2022-06-21 NOTE — PROGRESS NOTES
Specialty Pharmacy Refill Coordination Note     Odalis is a 71 y.o. female contacted today regarding refills of Dimethyl Fumarate specialty medication(s).    Reviewed and verified with patient: Yes  Specialty medication(s) and dose(s) confirmed: yes    Refill Questions    Flowsheet Row Most Recent Value   Changes to allergies? No   Changes to medications? Yes  [Recent hospitalization for PE. Released yesterday 6/20/2022. Escalated to pharmacist.]   New conditions since last clinic visit Yes  [Recent hospitalization for PE. Released yesterday 6/20/2022. Escalated to pharmacist.]   Unplanned office visit, urgent care, ED, or hospital admission in the last 4 weeks  Yes  [Recent hospitalization for PE. Released yesterday 6/20/2022. Escalated to pharmacist.]   How does patient/caregiver feel medication is working? Excellent   Financial problems or insurance changes  No   If yes, describe changes in insurance or financial issues. N/A   Since the previous refill, were any specialty medication doses or scheduled injections missed or delayed?  No  [Patient took her meds to hospital with her.]   If yes, please provide the amount N/A   Why were doses missed? N/A   Does this patient require a clinical escalation to a pharmacist? Yes  [Escalated to pharmacist due to recent hospitalization.]          Delivery Questions    Flowsheet Row Most Recent Value   Delivery method FedEx   Delivery address correct? Yes   Preferred delivery time? Anytime   Number of medications in delivery 1   Medication being filled and delivered Dimethyl Fumarate   Doses left of specialty medications About 1 week.   Is there any medication that is due not being filled? No   Supplies needed? No supplies needed   Cooler needed? No   Do any medications need mixed or dated? No   Copay form of payment Credit card on file   Questions or concerns for the pharmacist? No   Are any medications first time fills? No            Medication Adherence    Adherence tools  used: directed education  Support network for adherence: healthcare provider   Other support network: Pharmacy           Follow-up:  28 days.     Renata Ordaz, Pharmacy Technician  Specialty Pharmacy Technician

## 2022-06-21 NOTE — PROGRESS NOTES
Specialty Pharmacy Patient Management Program  Neurology Reassessment     Odalis Mir is a 71 y.o. female with multiple sclerosis seen by a Neurology provider and enrolled in the Neurology Patient Management program offered by [facility].  A follow-up outreach was conducted, including assessment of continued therapy appropriateness, medication adherence, and side effect incidence and management for  Dimethyl fumarate.     Changes to Insurance Coverage or Financial Support  Medicare/[place] Medicaid     Relevant Past Medical History and Comorbidities  Relevant medical history and concomitant health conditions were discussed with the patient. The patient's chart has been reviewed for relevant past medical history and comorbid health conditions and updated as necessary.   Past Medical History:   Diagnosis Date   • Anxiety and depression    • Elevated cholesterol    • GERD (gastroesophageal reflux disease)    • Hypertension    • Multiple sclerosis (CMS/HCC)    • Osteoarthritis    • Seizure (CMS/HCC)      Social History     Socioeconomic History   • Marital status: Single   Tobacco Use   • Smoking status: Never Smoker   • Smokeless tobacco: Never Used   Substance and Sexual Activity   • Alcohol use: No   • Drug use: No   • Sexual activity: Defer       Problem list reviewed by Elvia Mallory, PharmD on 6/21/2022 at 11:16 AM    Allergies  Known allergies and reactions were discussed with the patient. The patient's chart has been reviewed for allergy information and updated as necessary.   Shellfish-derived products and Sulfa antibiotics    Allergies reviewed by Elvia Mallory, PharmD on 6/21/2022 at 11:01 AM    Relevant Laboratory Values          Current Medication List  This medication list has been reviewed with the patient and evaluated for any interactions or necessary modifications/recommendations, and updated to include all prescription medications, OTC medications, and supplements  the patient is currently taking.  This list reflects what is contained in the patient's profile, which has also been marked as reviewed to communicate to other providers it is the most up to date version of the patient's current medication therapy.     Current Outpatient Medications:   •  alendronate (FOSAMAX) 70 MG tablet, Take 70 mg by mouth Every 7 (Seven) Days. Takes on MON, Disp: , Rfl:   •  ALPRAZolam (XANAX) 1 MG tablet, Take 1.5 mg by mouth 2 (Two) Times a Day As Needed for Anxiety., Disp: , Rfl:   •  amLODIPine (NORVASC) 10 MG tablet, Take  by mouth Daily., Disp: , Rfl:   •  ARIPiprazole (ABILIFY) 10 MG tablet, TAKE 1 TABLET BY MOUTH DAILY, Disp: 30 tablet, Rfl: 5  •  aspirin 81 MG chewable tablet, Chew 81 mg Daily., Disp: , Rfl:   •  atorvastatin (LIPITOR) 40 MG tablet, Take 40 mg by mouth Every Night., Disp: , Rfl:   •  carBAMazepine (TEGretol) 200 MG tablet, TAKE 2 TABLETS BY MOUTH EVERY MORNING, 1 TABLET BY MOUTH AT MIDDAY, AND 2 TABLETS BY MOUTH EVERY NIGHT AT BEDTIME, Disp: 450 tablet, Rfl: 2  •  Carboxymethylcellulose Sodium (STERILE LUBRICANT OP), Apply 1 dose to eye(s) as directed by provider As Needed., Disp: , Rfl:   •  carvedilol (COREG) 25 MG tablet, Take 25 mg by mouth Daily., Disp: , Rfl:   •  cetirizine (zyrTEC) 10 MG tablet, Take 10 mg by mouth Daily., Disp: , Rfl:   •  Cholecalciferol (VITAMIN D3 PO), Take 1,000 Units by mouth Daily., Disp: , Rfl:   •  Dimethyl Fumarate (Tecfidera) capsule delayed-release, Take 1 capsule by mouth 2 (Two) Times a Day., Disp: 60 capsule, Rfl: 11  •  Enoxaparin Sodium (LOVENOX) 80 MG/0.8ML solution prefilled syringe syringe, Inject  under the skin into the appropriate area as directed Every 12 (Twelve) Hours. Patient is unsure of dose (treatment is for 30 days), Disp: , Rfl:   •  FLUoxetine (PROzac) 60 MG tablet, Take  by mouth Daily., Disp: , Rfl:   •  fluticasone (VERAMYST) 27.5 MCG/SPRAY nasal spray, 2 sprays into the nostril(s) as directed by provider  Daily., Disp: , Rfl:   •  gabapentin (NEURONTIN) 300 MG capsule, , Disp: , Rfl:   •  hydrALAZINE (APRESOLINE) 25 MG tablet, , Disp: , Rfl:   •  HYDROcodone-acetaminophen (NORCO)  MG per tablet, Take 1 tablet by mouth Every 6 (Six) Hours As Needed for Moderate Pain . Every 6-8 hours prn, Disp: , Rfl:   •  hydrOXYzine (ATARAX) 25 MG tablet, TK 1 T PO TID PRA, Disp: , Rfl:   •  montelukast (SINGULAIR) 10 MG tablet, TK 1 T PO QHS, Disp: , Rfl:   •  Multiple Vitamins-Minerals (CENTRUM SILVER PO), Take 1 tablet by mouth Daily., Disp: , Rfl:   •  nitroglycerin (NITROSTAT) 0.4 MG SL tablet, Place 0.4 mg under the tongue Every 5 (Five) Minutes As Needed for Chest Pain. Take no more than 3 doses in 15 minutes., Disp: , Rfl:   •  omeprazole (priLOSEC) 40 MG capsule, TAKE 1 CAPSULE BY MOUTH DAILY 30 MINUTES BEFORE MEALS, Disp: , Rfl:   •  polyethylene glycol (MIRALAX) packet, Take 17 g by mouth As Needed (constipation)., Disp: , Rfl:   •  spironolactone (ALDACTONE) 25 MG tablet, TAKE 3 TS PO D, Disp: , Rfl:   •  valsartan-hydrochlorothiazide (DIOVAN-HCT) 320-25 MG per tablet, Take 1 tablet by mouth Daily., Disp: , Rfl:   •  vitamin C (ASCORBIC ACID) 500 MG tablet, Take 500 mg by mouth Daily., Disp: , Rfl:     Medicines reviewed by Elvia Mallory, PharmD on 6/21/2022 at 11:03 AM  Medicines reviewed by Elvia Mallory, PharmD on 6/21/2022 at 11:16 AM    Drug Interactions  none     Adverse Drug Reactions  • Adverse Reactions Experienced: none   • Plan for ADR Management: not required    Hospitalizations and Urgent Care Since Last Assessment          Hospitalizations or Admissions: Recent hospitalization for PE.  Discharged home on 6/20/22 on Lovenox for 30 days - added to patient's medication profile and DUR ran.  Ok to proceed with Tecfidera refill.  • ED Visits: none  • Urgent Office Visits: none     Adherence and Self-Administration  Adherence related patient's specialty therapy was discussed with the patient. The  Adherence segment of this outreach has been reviewed and updated.     Medication Adherence    Patient reported X missed doses in the last 7 days: 0  Any gaps in refill history greater than 2 weeks in the last 3 months: no  Demonstrates understanding of importance of adherence: yes  Informant: patient  Reliability of informant: reliable  Estimated medication adherence level: %  Adherence estimation source: claims history  Reasons for non-adherence: no problems identified  Adherence tools used: directed education  Support network for adherence: healthcare provider   Other support networks: Pharmacy         • Ongoing or New Barriers to Patient Adherence and/or Self-Administration: none   • Methods for Supporting Patient Adherence and/or Self-Administration: none     Goals of Therapy  Goals related to the patient's specialty therapy was discussed with the patient. The Patient Goals segment of this outreach has been reviewed and updated.    Goals     • Specialty Pharmacy General Goal      Control of MS exacerbation - reduce frequency and severity of relapses (no signs or symptoms of relapsed MS)               Quality of Life Assessment   Quality of Life related to the patient's specialty therapy was discussed with the patient. The QOL segment of this outreach has been reviewed and updated.     Quality of Life Assessment  Quality of Life Improvement Scale: No change  Comments on Quality of Life: no signs or symptoms of relapsed MS    Reassessment Plan & Follow-Up  1. Medication Therapy Changes: none  2. Additional Plans, Therapy Recommendations, or Therapy Problems to Be Addressed: Patient's next visit with neurology provider is on 7/12/22. Labwork was completed on 6/16/22 at .  3. Pharmacist to perform regular reassessments no more than (6) months from the previous assessment.  4. Care Coordinator to set up future refill outreaches, coordinate prescription delivery, and escalate clinical questions to pharmacist.      Attestation  I attest that the specialty medication(s) addressed above are appropriate for the patient based on my reassessment.  If the prescribed therapy is at any point deemed not appropriate based on the current or future assessments, a consultation will be initiated with the patient's specialty care provider to determine the best course of action. The revised plan of therapy will be documented along with any additional patient education provided.     Elvia Mallory, Kayden  6/21/2022  11:20 EDT

## 2022-07-20 ENCOUNTER — SPECIALTY PHARMACY (OUTPATIENT)
Dept: ONCOLOGY | Facility: HOSPITAL | Age: 72
End: 2022-07-20

## 2022-07-20 NOTE — PROGRESS NOTES
Specialty Pharmacy Refill Coordination Note     Odalis is a 71 y.o. female contacted today regarding refills of Dimethyl Fumarate specialty medication(s).    Reviewed and verified with patient: Yes  Specialty medication(s) and dose(s) confirmed: yes    Refill Questions    Flowsheet Row Most Recent Value   Changes to allergies? No   Changes to medications? No   New conditions since last clinic visit No   Unplanned office visit, urgent care, ED, or hospital admission in the last 4 weeks  No   How does patient/caregiver feel medication is working? Very good   Financial problems or insurance changes  No   If yes, describe changes in insurance or financial issues. N/A   Since the previous refill, were any specialty medication doses or scheduled injections missed or delayed?  No   If yes, please provide the amount N/A   Why were doses missed? N/A   Does this patient require a clinical escalation to a pharmacist? No          Delivery Questions    Flowsheet Row Most Recent Value   Delivery method FedEx   Delivery address correct? Yes   Preferred delivery time? Anytime   Number of medications in delivery 1   Medication being filled and delivered Dimethyl Fumarate   Doses left of specialty medications Just over 1 week   Is there any medication that is due not being filled? No   Supplies needed? No supplies needed   Cooler needed? No   Do any medications need mixed or dated? No   Copay form of payment Credit card on file   Questions or concerns for the pharmacist? No   Are any medications first time fills? No            Medication Adherence    Adherence tools used: directed education  Support network for adherence: healthcare provider   Other support network: Pharmacy           Follow-up:  28 days.     Renata Ordaz, Pharmacy Technician  Specialty Pharmacy Technician

## 2022-08-22 ENCOUNTER — SPECIALTY PHARMACY (OUTPATIENT)
Dept: ONCOLOGY | Facility: HOSPITAL | Age: 72
End: 2022-08-22

## 2022-08-22 NOTE — PROGRESS NOTES
Specialty Pharmacy Refill Coordination Note     Odalis is a 72 y.o. female contacted today regarding refills of  Dimethyl Fumarate specialty medication(s).    Reviewed and verified with patient:       Specialty medication(s) and dose(s) confirmed: yes    Refill Questions    Flowsheet Row Most Recent Value   Changes to allergies? No   Changes to medications? No   New conditions since last clinic visit No   Unplanned office visit, urgent care, ED, or hospital admission in the last 4 weeks  No   How does patient/caregiver feel medication is working? Very good   Financial problems or insurance changes  No   If yes, describe changes in insurance or financial issues. N/A   Since the previous refill, were any specialty medication doses or scheduled injections missed or delayed?  No   If yes, please provide the amount N/A   Why were doses missed? N/A   Does this patient require a clinical escalation to a pharmacist? No          Delivery Questions    Flowsheet Row Most Recent Value   Delivery method FedEx   Delivery address correct? Yes   Preferred delivery time? Anytime   Number of medications in delivery 1   Medication being filled and delivered Dimethyl Fumarate   Doses left of specialty medications 5-7 days left   Is there any medication that is due not being filled? No   Supplies needed? No supplies needed   Cooler needed? No   Do any medications need mixed or dated? No   Copay form of payment Payment plan already set up   Questions or concerns for the pharmacist? No   Are any medications first time fills? No            Medication Adherence    Adherence tools used: directed education  Support network for adherence: healthcare provider   Other support network: Pharmacy           Follow-up: 1 month(s)     Verona Levine, Pharmacy Technician  Specialty Pharmacy Technician

## 2022-09-19 ENCOUNTER — TELEPHONE (OUTPATIENT)
Dept: NEUROLOGY | Facility: CLINIC | Age: 72
End: 2022-09-19

## 2022-09-19 NOTE — TELEPHONE ENCOUNTER
Spoke with patient to try and get her scheduled for an appointment. Patient stated she would like to call us back to get scheduled. Informed her to reach out to the hub to get scheduled. I will reach back out in a few days if patient does not call to get scheduled.

## 2022-09-19 NOTE — TELEPHONE ENCOUNTER
----- Message from Elvia Mallory, Kayden sent at 9/19/2022  1:36 PM EDT -----  Regarding: Patient overdue for appt  Ms. Mir is overdue for an appointment, and will soon need a refill of Tecfidera.  We have asked her to reschedule since she missed her 7/12 appointment without success.  Would you care to reach out and see if you can get her scheduled again?  Thank you!  Elvia

## 2022-09-21 ENCOUNTER — SPECIALTY PHARMACY (OUTPATIENT)
Dept: ONCOLOGY | Facility: HOSPITAL | Age: 72
End: 2022-09-21

## 2022-09-21 RX ORDER — DIMETHYL FUMARATE 240 MG/1
240 CAPSULE ORAL 2 TIMES DAILY
Qty: 60 CAPSULE | Refills: 2 | Status: SHIPPED | OUTPATIENT
Start: 2022-09-21 | End: 2022-12-12 | Stop reason: SDUPTHER

## 2022-09-21 NOTE — PROGRESS NOTES
Specialty Pharmacy Refill Coordination Note     Odalis is a 72 y.o. female contacted today regarding refills of  Dimethyl Fumarate specialty medication(s).    Reviewed and verified with patient:       Specialty medication(s) and dose(s) confirmed: yes    Refill Questions    Flowsheet Row Most Recent Value   Changes to allergies? No   Changes to medications? No   New conditions since last clinic visit No   Unplanned office visit, urgent care, ED, or hospital admission in the last 4 weeks  No   How does patient/caregiver feel medication is working? Very good   Financial problems or insurance changes  No   If yes, describe changes in insurance or financial issues. N/A   Since the previous refill, were any specialty medication doses or scheduled injections missed or delayed?  No   If yes, please provide the amount N/A   Why were doses missed? N/A   Does this patient require a clinical escalation to a pharmacist? No          Delivery Questions    Flowsheet Row Most Recent Value   Delivery method FedEx   Delivery address correct? Yes   Preferred delivery time? Anytime   Number of medications in delivery 1   Medication being filled and delivered Dimethyl Fumarate   Doses left of specialty medications 3 days left   Is there any medication that is due not being filled? No   Supplies needed? No supplies needed   Cooler needed? No   Do any medications need mixed or dated? No   Copay form of payment Payment plan already set up   Questions or concerns for the pharmacist? No   Are any medications first time fills? No            Medication Adherence    Adherence tools used: directed education  Support network for adherence: healthcare provider   Other support network: Pharmacy           Follow-up: 1 month(s)     Verona Levine, Pharmacy Technician  Specialty Pharmacy Technician

## 2022-10-18 ENCOUNTER — SPECIALTY PHARMACY (OUTPATIENT)
Dept: ONCOLOGY | Facility: HOSPITAL | Age: 72
End: 2022-10-18

## 2022-10-18 NOTE — PROGRESS NOTES
Specialty Pharmacy Refill Coordination Note     Odalis is a 72 y.o. female contacted today regarding refills of  Dimethyl Fumarate specialty medication(s).    Reviewed and verified with patient:       Specialty medication(s) and dose(s) confirmed: yes    Refill Questions    Flowsheet Row Most Recent Value   Changes to allergies? No   Changes to medications? No   New conditions since last clinic visit No   Unplanned office visit, urgent care, ED, or hospital admission in the last 4 weeks  No   How does patient/caregiver feel medication is working? Very good   Financial problems or insurance changes  No   If yes, describe changes in insurance or financial issues. N/A   Since the previous refill, were any specialty medication doses or scheduled injections missed or delayed?  No   If yes, please provide the amount N/A   Why were doses missed? N/A   Does this patient require a clinical escalation to a pharmacist? No          Delivery Questions    Flowsheet Row Most Recent Value   Delivery method FedEx   Delivery address correct? Yes   Preferred delivery time? Anytime   Number of medications in delivery 1   Medication being filled and delivered Dimethyl Fumarate   Doses left of specialty medications about a week left   Is there any medication that is due not being filled? No   Supplies needed? No supplies needed   Do any medications need mixed or dated? No   Copay form of payment Payment plan already set up   Questions or concerns for the pharmacist? No   Are any medications first time fills? No            Medication Adherence    Adherence tools used: directed education  Support network for adherence: healthcare provider   Other support network: Pharmacy           Follow-up: 1 month(s)     Verona Levine, Pharmacy Technician  Specialty Pharmacy Technician

## 2022-11-17 ENCOUNTER — SPECIALTY PHARMACY (OUTPATIENT)
Dept: ONCOLOGY | Facility: HOSPITAL | Age: 72
End: 2022-11-17

## 2022-11-17 NOTE — PROGRESS NOTES
Specialty Pharmacy Refill Coordination Note     Odalis is a 72 y.o. female contacted today regarding refills of Dimethyl Fumarate specialty medication(s).    Reviewed and verified with patient: Yes  Specialty medication(s) and dose(s) confirmed: yes    Refill Questions    Flowsheet Row Most Recent Value   Changes to allergies? No   Changes to medications? No   New conditions since last clinic visit No   Unplanned office visit, urgent care, ED, or hospital admission in the last 4 weeks  No   How does patient/caregiver feel medication is working? Very good   Financial problems or insurance changes  No   If yes, describe changes in insurance or financial issues. N/A   Since the previous refill, were any specialty medication doses or scheduled injections missed or delayed?  No   If yes, please provide the amount N/A   Why were doses missed? N/A   Does this patient require a clinical escalation to a pharmacist? No          Delivery Questions    Flowsheet Row Most Recent Value   Delivery method FedEx   Delivery address correct? Yes   Preferred delivery time? Anytime   Number of medications in delivery 1   Medication being filled and delivered Dimethyl Fumarate   Doses left of specialty medications Just over 1 week   Is there any medication that is due not being filled? No   Supplies needed? No supplies needed   Cooler needed? No   Do any medications need mixed or dated? No   Copay form of payment Credit card on file   Questions or concerns for the pharmacist? No   Are any medications first time fills? No            Medication Adherence    Adherence tools used: directed education  Support network for adherence: healthcare provider   Other support network: Pharmacy           Follow-up:  28 days     Renata Ordaz, Pharmacy Technician  Specialty Pharmacy Technician

## 2022-12-07 ENCOUNTER — SPECIALTY PHARMACY (OUTPATIENT)
Dept: ONCOLOGY | Facility: HOSPITAL | Age: 72
End: 2022-12-07

## 2022-12-07 DIAGNOSIS — G35 MULTIPLE SCLEROSIS: Primary | ICD-10-CM

## 2022-12-12 ENCOUNTER — SPECIALTY PHARMACY (OUTPATIENT)
Dept: ONCOLOGY | Facility: HOSPITAL | Age: 72
End: 2022-12-12

## 2022-12-12 RX ORDER — DIMETHYL FUMARATE 240 MG/1
240 CAPSULE ORAL 2 TIMES DAILY
Qty: 60 CAPSULE | Refills: 1 | Status: SHIPPED | OUTPATIENT
Start: 2022-12-12 | End: 2023-02-13 | Stop reason: SDUPTHER

## 2022-12-12 NOTE — PROGRESS NOTES
Specialty Pharmacy Patient Management Program  Neurology Reassessment     Odalis Mir is a 72 y.o. female with multiple sclerosis seen by a Neurology provider and enrolled in the Neurology Patient Management program offered by [facility].  A follow-up outreach was conducted, including assessment of continued therapy appropriateness, medication adherence, and side effect incidence and management for  Tecfidera.     Changes to Insurance Coverage or Financial Support  Medicare     Relevant Past Medical History and Comorbidities  Relevant medical history and concomitant health conditions were discussed with the patient. The patient's chart has been reviewed for relevant past medical history and comorbid health conditions and updated as necessary.   Past Medical History:   Diagnosis Date   • Anxiety and depression    • Elevated cholesterol    • GERD (gastroesophageal reflux disease)    • Hypertension    • Multiple sclerosis (CMS/HCC)    • Osteoarthritis    • Seizure (CMS/[facility])      Social History     Socioeconomic History   • Marital status: Single   Tobacco Use   • Smoking status: Never   • Smokeless tobacco: Never   Substance and Sexual Activity   • Alcohol use: No   • Drug use: No   • Sexual activity: Defer       Problem list reviewed by Elvia Mallory, PharmD on 12/12/2022 at 11:43 AM    Allergies  Known allergies and reactions were discussed with the patient. The patient's chart has been reviewed for allergy information and updated as necessary.   Shellfish-derived products and Sulfa antibiotics    Allergies reviewed by Elvia Mallory, PharmD on 12/12/2022 at 11:31 AM    Relevant Laboratory Values    Common labs    Common Labs 6/16/22 6/16/22 6/16/22 6/17/22 6/17/22 6/17/22    1832 1832 1832 0423 0423 0423   Glucose 85        Glucose   87  113 (A)    BUN   13  12    Creatinine   0.80  0.78    eGFR Non African Am   >60  >60    eGFR  Am   >60  >60    Sodium   137  137     Potassium 3.7  3.7  3.6 (A)    Chloride 104  99  102    Calcium   9.6  9.3    Albumin   4.6      Total Bilirubin   0.2      Alkaline Phosphatase   81      AST (SGOT)   15      ALT (SGPT)   10      WBC  7.93  6.52     Hemoglobin  12.4  11.3     Hematocrit  36.9  33.5 (A)     Platelets  190  177     Hemoglobin A1C      6.3 (A)   (A) Abnormal value       Comments are available for some flowsheets but are not being displayed.               Current Medication List  This medication list has been reviewed with the patient and evaluated for any interactions or necessary modifications/recommendations, and updated to include all prescription medications, OTC medications, and supplements the patient is currently taking.  This list reflects what is contained in the patient's profile, which has also been marked as reviewed to communicate to other providers it is the most up to date version of the patient's current medication therapy.     Current Outpatient Medications:   •  alendronate (FOSAMAX) 70 MG tablet, Take 70 mg by mouth Every 7 (Seven) Days. Takes on MON, Disp: , Rfl:   •  amLODIPine (NORVASC) 10 MG tablet, Take  by mouth Daily., Disp: , Rfl:   •  ARIPiprazole (ABILIFY) 10 MG tablet, TAKE 1 TABLET BY MOUTH DAILY, Disp: 30 tablet, Rfl: 5  •  atorvastatin (LIPITOR) 40 MG tablet, Take 40 mg by mouth Every Night., Disp: , Rfl:   •  Carboxymethylcellulose Sodium (STERILE LUBRICANT OP), Apply 1 dose to eye(s) as directed by provider As Needed., Disp: , Rfl:   •  carvedilol (COREG) 25 MG tablet, Take 25 mg by mouth Daily., Disp: , Rfl:   •  cetirizine (zyrTEC) 10 MG tablet, Take 10 mg by mouth Daily., Disp: , Rfl:   •  Cholecalciferol (VITAMIN D3 PO), Take 1,000 Units by mouth Daily., Disp: , Rfl:   •  Dimethyl Fumarate (Tecfidera) capsule delayed-release, Take 1 capsule by mouth 2 (Two) Times a Day., Disp: 60 capsule, Rfl: 2  •  FLUoxetine (PROzac) 60 MG tablet, Take  by mouth Daily., Disp: , Rfl:   •  fluticasone  (VERAMYST) 27.5 MCG/SPRAY nasal spray, 2 sprays into the nostril(s) as directed by provider Daily., Disp: , Rfl:   •  gabapentin (NEURONTIN) 300 MG capsule, , Disp: , Rfl:   •  hydrALAZINE (APRESOLINE) 25 MG tablet, , Disp: , Rfl:   •  montelukast (SINGULAIR) 10 MG tablet, Take 10 mg by mouth Every Night., Disp: , Rfl:   •  Multiple Vitamins-Minerals (CENTRUM SILVER PO), Take 1 tablet by mouth Daily., Disp: , Rfl:   •  polyethylene glycol (MIRALAX) packet, Take 17 g by mouth As Needed (constipation)., Disp: , Rfl:   •  vitamin C (ASCORBIC ACID) 500 MG tablet, Take 500 mg by mouth Daily., Disp: , Rfl:     Medicines reviewed by Elvia Mallory, PharmD on 12/12/2022 at 11:36 AM    Drug Interactions  none     Adverse Drug Reactions  • Adverse Reactions Experienced: none   • Plan for ADR Management: not required    Hospitalizations and Urgent Care Since Last Assessment  • Hospitalizations or Admissions: none   • ED Visits: none  • Urgent Office Visits: none     Adherence and Self-Administration  Adherence related patient's specialty therapy was discussed with the patient. The Adherence segment of this outreach has been reviewed and updated.     Medication Adherence    Adherence tools used: directed education  Support network for adherence: healthcare provider   Other support networks: Pharmacy         • Ongoing or New Barriers to Patient Adherence and/or Self-Administration: none   • Methods for Supporting Patient Adherence and/or Self-Administration: none     Goals of Therapy  Goals related to the patient's specialty therapy was discussed with the patient. The Patient Goals segment of this outreach has been reviewed and updated.    Goals     • Specialty Pharmacy General Goal      Control of MS exacerbation - reduce frequency and severity of relapses (no signs or symptoms of relapsed MS)             Quality of Life Assessment   Quality of Life related to the patient's specialty therapy was discussed with the patient. The  QOL segment of this outreach has been reviewed and updated.     Quality of Life Assessment  Quality of Life Improvement Scale: Somewhat better    Reassessment Plan & Follow-Up  1. Medication Therapy Changes: none  2. Additional Plans, Therapy Recommendations, or Therapy Problems to Be Addressed: Patient is due lab work this month, and overdue for an appointment with the provider.  Patient was able to schedule next appointment on 2/2/2023, and want to have labs done at that time as she has trouble getting out.   3. Pharmacist to perform regular reassessments no more than (6) months from the previous assessment.  4. Care Coordinator to set up future refill outreaches, coordinate prescription delivery, and escalate clinical questions to pharmacist.     Attestation  I attest that the specialty medication(s) addressed above are appropriate for the patient based on my reassessment.  If the prescribed therapy is at any point deemed not appropriate based on the current or future assessments, a consultation will be initiated with the patient's specialty care provider to determine the best course of action. The revised plan of therapy will be documented along with any additional patient education provided.     Elvia Mallory, PharmD  12/12/2022  11:44 EST

## 2022-12-12 NOTE — PROGRESS NOTES
Specialty Pharmacy Refill Coordination Note     Odalis is a 72 y.o. female contacted today regarding refills of Dimethyl Fumarate specialty medication(s).    Reviewed and verified with patient: Yes  Specialty medication(s) and dose(s) confirmed: yes    Refill Questions    Flowsheet Row Most Recent Value   Changes to allergies? No   Changes to medications? No   New conditions since last clinic visit No   Unplanned office visit, urgent care, ED, or hospital admission in the last 4 weeks  No   How does patient/caregiver feel medication is working? Excellent   Financial problems or insurance changes  No   If yes, describe changes in insurance or financial issues. N/A   Since the previous refill, were any specialty medication doses or scheduled injections missed or delayed?  No   If yes, please provide the amount N/A   Why were doses missed? N/A   Does this patient require a clinical escalation to a pharmacist? No          Delivery Questions    Flowsheet Row Most Recent Value   Delivery method FedEx   Delivery address correct? Yes   Preferred delivery time? Anytime   Number of medications in delivery 1   Medication being filled and delivered Dimethyl Fumarate   Doses left of specialty medications About 1 week.   Is there any medication that is due not being filled? No   Supplies needed? No supplies needed   Cooler needed? No   Do any medications need mixed or dated? No   Copay form of payment Credit card on file   Questions or concerns for the pharmacist? No   Are any medications first time fills? No            Medication Adherence    Adherence tools used: directed education  Support network for adherence: healthcare provider   Other support network: Pharmacy           Follow-up:  28 days     Renata Ordaz, Pharmacy Technician  Specialty Pharmacy Technician

## 2023-01-16 ENCOUNTER — SPECIALTY PHARMACY (OUTPATIENT)
Dept: ONCOLOGY | Facility: HOSPITAL | Age: 73
End: 2023-01-16
Payer: MEDICARE

## 2023-01-16 NOTE — PROGRESS NOTES
Specialty Pharmacy Refill Coordination Note     Odalis is a 72 y.o. female contacted today regarding refills of Dimethyl Fumarate specialty medication(s).    Reviewed and verified with patient: Yes  Specialty medication(s) and dose(s) confirmed: yes    Refill Questions    Flowsheet Row Most Recent Value   Changes to allergies? No   Changes to medications? No   New conditions since last clinic visit No   Unplanned office visit, urgent care, ED, or hospital admission in the last 4 weeks  No   How does patient/caregiver feel medication is working? Excellent   Financial problems or insurance changes  No   If yes, describe changes in insurance or financial issues. N/A   Since the previous refill, were any specialty medication doses or scheduled injections missed or delayed?  No   If yes, please provide the amount N/A   Why were doses missed? N/A   Does this patient require a clinical escalation to a pharmacist? No          Delivery Questions    Flowsheet Row Most Recent Value   Delivery method FedEx   Delivery address correct? Yes   Preferred delivery time? Anytime   Number of medications in delivery 1   Medication being filled and delivered Dimethyl Fumarate   Doses left of specialty medications About 3 days   Is there any medication that is due not being filled? No   Supplies needed? No supplies needed   Cooler needed? No   Do any medications need mixed or dated? No   Copay form of payment Credit card on file   Questions or concerns for the pharmacist? No   Are any medications first time fills? No            Medication Adherence    Adherence tools used: directed education  Support network for adherence: healthcare provider   Other support network: Pharmacy           Follow-up:  28 days     Renata Ordaz, Pharmacy Technician  Specialty Pharmacy Technician

## 2023-02-13 ENCOUNTER — SPECIALTY PHARMACY (OUTPATIENT)
Dept: ONCOLOGY | Facility: HOSPITAL | Age: 73
End: 2023-02-13
Payer: MEDICARE

## 2023-02-13 RX ORDER — DIMETHYL FUMARATE 240 MG/1
240 CAPSULE ORAL 2 TIMES DAILY
Qty: 60 CAPSULE | Refills: 2 | Status: SHIPPED | OUTPATIENT
Start: 2023-02-13

## 2023-02-13 NOTE — PROGRESS NOTES
Specialty Pharmacy Refill Coordination Note     Odalis is a 72 y.o. female contacted today regarding refills of Dimethyl Fumarate specialty medication(s).    Reviewed and verified with patient: Yes  Specialty medication(s) and dose(s) confirmed: yes    Refill Questions    Flowsheet Row Most Recent Value   Changes to allergies? No   Changes to medications? No   New conditions since last clinic visit No   Unplanned office visit, urgent care, ED, or hospital admission in the last 4 weeks  No   How does patient/caregiver feel medication is working? Excellent   Financial problems or insurance changes  No   If yes, describe changes in insurance or financial issues. N/A   Since the previous refill, were any specialty medication doses or scheduled injections missed or delayed?  No   If yes, please provide the amount N/A   Why were doses missed? N/A   Does this patient require a clinical escalation to a pharmacist? No          Delivery Questions    Flowsheet Row Most Recent Value   Delivery method FedEx   Delivery address correct? Yes   Preferred delivery time? Anytime   Number of medications in delivery 1   Medication being filled and delivered Dimethyl Fumarate   Doses left of specialty medications About 1 week left.   Is there any medication that is due not being filled? No   Supplies needed? No supplies needed   Cooler needed? No   Do any medications need mixed or dated? No   Copay form of payment Credit card on file   Questions or concerns for the pharmacist? No   Are any medications first time fills? No            Medication Adherence    Adherence tools used: directed education  Support network for adherence: healthcare provider   Other support network: Pharmacy           Follow-up:  28 days     Renata Ordaz, Pharmacy Technician  Specialty Pharmacy Technician

## 2023-03-14 ENCOUNTER — SPECIALTY PHARMACY (OUTPATIENT)
Dept: ONCOLOGY | Facility: HOSPITAL | Age: 73
End: 2023-03-14
Payer: MEDICARE

## 2023-03-14 NOTE — PROGRESS NOTES
Specialty Pharmacy Refill Coordination Note     Odalis is a 72 y.o. female contacted today regarding refills of Dimethyl Fumarate specialty medication(s).    Reviewed and verified with patient: Yes  Specialty medication(s) and dose(s) confirmed: yes    Refill Questions    Flowsheet Row Most Recent Value   Changes to allergies? No   Changes to medications? No   New conditions since last clinic visit No   Unplanned office visit, urgent care, ED, or hospital admission in the last 4 weeks  No   How does patient/caregiver feel medication is working? Excellent   Financial problems or insurance changes  No   If yes, describe changes in insurance or financial issues. N/A   Since the previous refill, were any specialty medication doses or scheduled injections missed or delayed?  No   If yes, please provide the amount N/A   Why were doses missed? N/A   Does this patient require a clinical escalation to a pharmacist? No          Delivery Questions    Flowsheet Row Most Recent Value   Delivery method FedEx   Delivery address correct? Yes   Preferred delivery time? Anytime   Number of medications in delivery 1   Medication being filled and delivered Dimethyl Fumarate   Doses left of specialty medications Just under 1 week   Is there any medication that is due not being filled? No   Supplies needed? No supplies needed   Cooler needed? No   Do any medications need mixed or dated? No   Copay form of payment Credit card on file   Questions or concerns for the pharmacist? No   Are any medications first time fills? No            Medication Adherence    Adherence tools used: directed education  Support network for adherence: healthcare provider   Other support network: Pharmacy           Follow-up: 28 days     Renata Ordaz, Pharmacy Technician  Specialty Pharmacy Technician

## 2023-04-18 ENCOUNTER — SPECIALTY PHARMACY (OUTPATIENT)
Dept: ONCOLOGY | Facility: HOSPITAL | Age: 73
End: 2023-04-18
Payer: MEDICARE

## 2023-04-18 NOTE — PROGRESS NOTES
Specialty Pharmacy Refill Coordination Note     Odalis is a 72 y.o. female contacted today regarding refills of Dimethyl Fumarate specialty medication(s).    Reviewed and verified with patient: Yes  Specialty medication(s) and dose(s) confirmed: yes    Refill Questions    Flowsheet Row Most Recent Value   Changes to allergies? No   Changes to medications? No   New conditions since last clinic visit No   Unplanned office visit, urgent care, ED, or hospital admission in the last 4 weeks  No   How does patient/caregiver feel medication is working? Excellent   Financial problems or insurance changes  No   If yes, describe changes in insurance or financial issues. N/A   Since the previous refill, were any specialty medication doses or scheduled injections missed or delayed?  No   If yes, please provide the amount N/A   Why were doses missed? N/A   Does this patient require a clinical escalation to a pharmacist? No          Delivery Questions    Flowsheet Row Most Recent Value   Delivery method FedEx   Delivery address correct? Yes   Preferred delivery time? Anytime   Number of medications in delivery 1   Medication being filled and delivered Dimethyl Fumarate   Doses left of specialty medications Just a few days left.   Is there any medication that is due not being filled? No   Supplies needed? No supplies needed   Cooler needed? No   Do any medications need mixed or dated? No   Copay form of payment Credit card on file   Questions or concerns for the pharmacist? No   Are any medications first time fills? No            Medication Adherence    Adherence tools used: directed education  Support network for adherence: healthcare provider   Other support network: Pharmacy           Follow-up:  28 days     Renata Ordaz, Pharmacy Technician  Specialty Pharmacy Technician

## 2023-05-16 ENCOUNTER — SPECIALTY PHARMACY (OUTPATIENT)
Dept: ONCOLOGY | Facility: HOSPITAL | Age: 73
End: 2023-05-16
Payer: MEDICARE

## 2023-05-16 RX ORDER — DIMETHYL FUMARATE 240 MG/1
240 CAPSULE ORAL 2 TIMES DAILY
Qty: 60 CAPSULE | Refills: 2 | Status: SHIPPED | OUTPATIENT
Start: 2023-05-16

## 2023-05-16 NOTE — PROGRESS NOTES
Specialty Pharmacy Patient Management Program  Neurology Reassessment     Odalis Mir is a 72 y.o. female with multiple sclerosis seen by a Neurology provider and enrolled in the Neurology Patient Management program offered by [facility].  A follow-up outreach was conducted, including assessment of continued therapy appropriateness, medication adherence, and side effect incidence and management for  dimethyl fumarate.     Changes to Insurance Coverage or Financial Support  [employer]     Relevant Past Medical History and Comorbidities  Relevant medical history and concomitant health conditions were discussed with the patient. The patient's chart has been reviewed for relevant past medical history and comorbid health conditions and updated as necessary.   Past Medical History:   Diagnosis Date   • Anxiety and depression    • Elevated cholesterol    • GERD (gastroesophageal reflux disease)    • Hypertension    • Multiple sclerosis    • Osteoarthritis    • Seizure      Social History     Socioeconomic History   • Marital status: Single   Tobacco Use   • Smoking status: Never   • Smokeless tobacco: Never   Substance and Sexual Activity   • Alcohol use: No   • Drug use: No   • Sexual activity: Defer       Problem list reviewed by Elvia Mallory, PharmD on 5/16/2023 at  1:20 PM    Allergies  Known allergies and reactions were discussed with the patient. The patient's chart has been reviewed for allergy information and updated as necessary.   Allergies   Allergen Reactions   • Shellfish-Derived Products Anaphylaxis   • Sulfa Antibiotics Anaphylaxis         Allergies reviewed by Elvia Mallory, PharmD on 5/16/2023 at  1:18 PM    Relevant Laboratory Values    Common labs        6/16/2022    18:32 6/17/2022    04:23   Common Labs   Glucose 85         Glucose 87      113        BUN 13      12        Creatinine 0.80      0.78        EGFR Non  Am >60      >60        EGFR  Am >60       >60        Sodium 137      137        Potassium 3.7         3.7      3.6        Chloride 104         99      102        Calcium 9.6      9.3        Albumin 4.6         Total Bilirubin 0.2         Alkaline Phosphatase 81         AST (SGOT) 15         ALT (SGPT) 10         WBC 7.93      6.52        Hemoglobin 12.4      11.3        Hematocrit 36.9      33.5        Platelets 190      177        Hemoglobin A1C  6.3            This result is from an external source.         Current Medication List  This medication list has been reviewed with the patient and evaluated for any interactions or necessary modifications/recommendations, and updated to include all prescription medications, OTC medications, and supplements the patient is currently taking.  This list reflects what is contained in the patient's profile, which has also been marked as reviewed to communicate to other providers it is the most up to date version of the patient's current medication therapy.     Current Outpatient Medications:   •  alendronate (FOSAMAX) 70 MG tablet, Take 1 tablet by mouth Every 7 (Seven) Days. Takes on MON, Disp: , Rfl:   •  amLODIPine (NORVASC) 10 MG tablet, Take  by mouth Daily., Disp: , Rfl:   •  atorvastatin (LIPITOR) 40 MG tablet, Take 1 tablet by mouth Every Night., Disp: , Rfl:   •  Carboxymethylcellulose Sodium (STERILE LUBRICANT OP), Apply 1 dose to eye(s) as directed by provider As Needed., Disp: , Rfl:   •  carvedilol (COREG) 25 MG tablet, Take 1 tablet by mouth Daily., Disp: , Rfl:   •  cetirizine (zyrTEC) 10 MG tablet, Take 1 tablet by mouth Daily., Disp: , Rfl:   •  Dimethyl Fumarate (Tecfidera) capsule delayed-release, Take 1 capsule by mouth 2 (Two) Times a Day., Disp: 60 capsule, Rfl: 2  •  FLUoxetine (PROzac) 60 MG tablet, Take  by mouth Daily., Disp: , Rfl:   •  gabapentin (NEURONTIN) 300 MG capsule, , Disp: , Rfl:   •  hydrALAZINE (APRESOLINE) 25 MG tablet, , Disp: , Rfl:   •  montelukast (SINGULAIR) 10 MG  tablet, Take 1 tablet by mouth Every Night., Disp: , Rfl:   •  Multiple Vitamins-Minerals (CENTRUM SILVER PO), Take 1 tablet by mouth Daily., Disp: , Rfl:   •  polyethylene glycol (MIRALAX) packet, Take 17 g by mouth As Needed (constipation)., Disp: , Rfl:     Medicines reviewed by Elvia Mallory, PharmD on 5/16/2023 at  1:20 PM    Drug Interactions  none     Adverse Drug Reactions  • Adverse Reactions Experienced: none   • Plan for ADR Management: not required    Hospitalizations and Urgent Care Since Last Assessment  • Hospitalizations or Admissions: none   • ED Visits: none  • Urgent Office Visits: none     Adherence and Self-Administration  Adherence related patient's specialty therapy was discussed with the patient. The Adherence segment of this outreach has been reviewed and updated.     Medication Adherence    Adherence tools used: directed education  Support network for adherence: healthcare provider   Other support networks: Pharmacy         • Ongoing or New Barriers to Patient Adherence and/or Self-Administration: none   • Methods for Supporting Patient Adherence and/or Self-Administration: not required     Goals of Therapy  Goals related to the patient's specialty therapy was discussed with the patient. The Patient Goals segment of this outreach has been reviewed and updated.    Goals     • Specialty Pharmacy General Goal      Control of MS exacerbation - reduce frequency and severity of relapses (no signs or symptoms of relapsed MS)             Quality of Life Assessment   Quality of Life related to the patient's specialty therapy was discussed with the patient. The QOL segment of this outreach has been reviewed and updated.     Quality of Life Assessment  Quality of Life Improvement Scale: Moderately better    Reassessment Plan & Follow-Up  1. Medication Therapy Changes: none  2. Additional Plans, Therapy Recommendations, or Therapy Problems to Be Addressed: none  3. Pharmacist to perform regular  reassessments no more than (6) months from the previous assessment.  4. Care Coordinator to set up future refill outreaches, coordinate prescription delivery, and escalate clinical questions to pharmacist.     Attestation  I attest that the specialty medication(s) addressed above are appropriate for the patient based on my reassessment.  If the prescribed therapy is at any point deemed not appropriate based on the current or future assessments, a consultation will be initiated with the patient's specialty care provider to determine the best course of action. The revised plan of therapy will be documented along with any additional patient education provided.     Elvia Mallory, PharmD  5/16/2023  13:26 EDT

## 2023-05-16 NOTE — PROGRESS NOTES
Specialty Pharmacy Refill Coordination Note     Odalis is a 72 y.o. female contacted today regarding refills of  Dimethyl fumarate specialty medication(s).    Reviewed and verified with patient:  Allergies  Meds  Problems       Specialty medication(s) and dose(s) confirmed: yes    Refill Questions    Flowsheet Row Most Recent Value   Changes to allergies? No   Changes to medications? No   New conditions since last clinic visit No   Unplanned office visit, urgent care, ED, or hospital admission in the last 4 weeks  No   How does patient/caregiver feel medication is working? Excellent   Financial problems or insurance changes  No   If yes, describe changes in insurance or financial issues. N/A   Since the previous refill, were any specialty medication doses or scheduled injections missed or delayed?  No   If yes, please provide the amount N/A   Why were doses missed? N/A   Does this patient require a clinical escalation to a pharmacist? No          Delivery Questions    Flowsheet Row Most Recent Value   Delivery method FedEx   Delivery address correct? Yes   Preferred delivery time? Anytime   Number of medications in delivery 1   Medication being filled and delivered Dimethyl Fumarate   Doses left of specialty medications Just a few days left.   Is there any medication that is due not being filled? No   Supplies needed? No supplies needed   Do any medications need mixed or dated? No   Copay form of payment Credit card on file   Questions or concerns for the pharmacist? No   Are any medications first time fills? No            Medication Adherence    Adherence tools used: directed education  Support network for adherence: healthcare provider   Other support network: Pharmacy           Follow-up: 28 day(s)     Elvia Mallory, PegD

## 2023-06-14 ENCOUNTER — SPECIALTY PHARMACY (OUTPATIENT)
Dept: ONCOLOGY | Facility: HOSPITAL | Age: 73
End: 2023-06-14
Payer: MEDICARE

## 2023-06-14 NOTE — PROGRESS NOTES
Specialty Pharmacy Refill Coordination Note     Odalis is a 72 y.o. female contacted today regarding refills of [employer] specialty medication(s).    Reviewed and verified with patient:       Specialty medication(s) and dose(s) confirmed: yes    Refill Questions      Flowsheet Row Most Recent Value   Changes to allergies? No   Changes to medications? No   New conditions since last clinic visit No   Unplanned office visit, urgent care, ED, or hospital admission in the last 4 weeks  No   How does patient/caregiver feel medication is working? Excellent   Financial problems or insurance changes  No   If yes, describe changes in insurance or financial issues. N/A   Since the previous refill, were any specialty medication doses or scheduled injections missed or delayed?  No   If yes, please provide the amount N/A   Why were doses missed? N/A   Does this patient require a clinical escalation to a pharmacist? No            Delivery Questions      Flowsheet Row Most Recent Value   Delivery method FedEx   Delivery address correct? Yes   Preferred delivery time? Anytime   Medication being filled and delivered Dimethyl Fumarate   Doses left of specialty medications Just a few days left.   Is there any medication that is due not being filled? No   Supplies needed? No supplies needed   Cooler needed? No   Do any medications need mixed or dated? No   Copay form of payment Credit card on file   Additional comments gate code for entry 3975   Questions or concerns for the pharmacist? No   Explain any questions or concerns for the pharmacist n/a   Are any medications first time fills? No              Medication Adherence    Adherence tools used: directed education  Support network for adherence: healthcare provider   Other support network: Pharmacy             Follow-up: 28 day(s)     Elvia Mallory, PegD

## 2023-08-15 ENCOUNTER — SPECIALTY PHARMACY (OUTPATIENT)
Dept: ONCOLOGY | Facility: HOSPITAL | Age: 73
End: 2023-08-15
Payer: MEDICARE

## 2023-08-15 RX ORDER — DIMETHYL FUMARATE 240 MG/1
240 CAPSULE ORAL 2 TIMES DAILY
Qty: 60 CAPSULE | Refills: 0 | Status: SHIPPED | OUTPATIENT
Start: 2023-08-15

## 2023-08-15 NOTE — PROGRESS NOTES
Specialty Pharmacy Refill Coordination Note     Odalis is a 73 y.o. female contacted today regarding refills of  Dimethyl Fumarate specialty medication(s).    Reviewed and verified with patient:       Specialty medication(s) and dose(s) confirmed: yes    Refill Questions      Flowsheet Row Most Recent Value   Changes to allergies? No   Changes to medications? No   New conditions since last clinic visit No   Unplanned office visit, urgent care, ED, or hospital admission in the last 4 weeks  No   How does patient/caregiver feel medication is working? Excellent   Financial problems or insurance changes  No   If yes, describe changes in insurance or financial issues. N/A   Since the previous refill, were any specialty medication doses or scheduled injections missed or delayed?  No   If yes, please provide the amount N/A   Why were doses missed? N/A   Does this patient require a clinical escalation to a pharmacist? No            Delivery Questions      Flowsheet Row Most Recent Value   Delivery method FedEx   Delivery address correct? Yes   Preferred delivery time? Anytime   Number of medications in delivery 1   Medication being filled and delivered Dimethyl Fumarate   Doses left of specialty medications 3-4 days left   Is there any medication that is due not being filled? No   Supplies needed? No supplies needed   Cooler needed? No   Do any medications need mixed or dated? No   Copay form of payment Payment plan already set up   Additional comments Dimethyl Fumarate=$0   Questions or concerns for the pharmacist? No   Are any medications first time fills? No              Medication Adherence    Adherence tools used: directed education  Support network for adherence: healthcare provider   Other support network: Pharmacy             Follow-up: 1 month(s)     Verona Levine, Pharmacy Technician  Specialty Pharmacy Technician

## 2023-08-30 ENCOUNTER — TELEPHONE (OUTPATIENT)
Dept: NEUROLOGY | Facility: CLINIC | Age: 73
End: 2023-08-30

## 2023-08-30 NOTE — TELEPHONE ENCOUNTER
Caller: Odalis Mir    Relationship: Self    Best call back number: [phone]    WWhat was the call regarding: R/S APPT.   I RESCHEDULED APPT FOR 11-30-23    Is it okay if the provider responds through MyChart:

## 2023-08-30 NOTE — TELEPHONE ENCOUNTER
Caller: Odalis Mir    Relationship to patient: Self    Best call back number: [phone]    Chief complaint: [facility]    Type of visit: [facility]    Requested date: ANY     If rescheduling, when is the original appointment: 8/31/23 - CANCELED VIA TEXT     Additional notes: PATIENT HAS FAMILY IN TOWN & CAN'T MAKE APPT - HUB UNABLE TO RESCHEDULE

## 2023-09-07 ENCOUNTER — SPECIALTY PHARMACY (OUTPATIENT)
Dept: ONCOLOGY | Facility: HOSPITAL | Age: 73
End: 2023-09-07
Payer: MEDICARE

## 2023-09-07 RX ORDER — DIMETHYL FUMARATE 240 MG/1
240 CAPSULE ORAL 2 TIMES DAILY
Qty: 60 CAPSULE | Refills: 1 | Status: SHIPPED | OUTPATIENT
Start: 2023-09-07

## 2023-09-08 ENCOUNTER — SPECIALTY PHARMACY (OUTPATIENT)
Dept: ONCOLOGY | Facility: HOSPITAL | Age: 73
End: 2023-09-08
Payer: MEDICARE

## 2023-09-12 ENCOUNTER — SPECIALTY PHARMACY (OUTPATIENT)
Dept: ONCOLOGY | Facility: HOSPITAL | Age: 73
End: 2023-09-12
Payer: MEDICARE

## 2023-09-12 NOTE — PROGRESS NOTES
Specialty Pharmacy Refill Coordination Note     Odalis is a 73 y.o. female contacted today regarding refills of  Dimethyl Fumarate specialty medication(s).    Reviewed and verified with patient:       Specialty medication(s) and dose(s) confirmed: yes    Refill Questions      Flowsheet Row Most Recent Value   Changes to allergies? No   Changes to medications? No   New conditions since last clinic visit No   Unplanned office visit, urgent care, ED, or hospital admission in the last 4 weeks  No   How does patient/caregiver feel medication is working? Excellent   Financial problems or insurance changes  No   If yes, describe changes in insurance or financial issues. N/A   Since the previous refill, were any specialty medication doses or scheduled injections missed or delayed?  No   If yes, please provide the amount N/A   Why were doses missed? N/A   Does this patient require a clinical escalation to a pharmacist? No            Delivery Questions      Flowsheet Row Most Recent Value   Delivery method FedEx   Delivery address correct? Yes   Preferred delivery time? Anytime   Number of medications in delivery 1   Medication being filled and delivered Dimethyl Fumarate   Doses left of specialty medications almost a week left   Is there any medication that is due not being filled? No   Supplies needed? No supplies needed   Cooler needed? No   Do any medications need mixed or dated? No   Copay form of payment Payment plan already set up   Additional comments Dimethyl Fumarate=$0   Questions or concerns for the pharmacist? No   Are any medications first time fills? No              Medication Adherence    Adherence tools used: directed education  Support network for adherence: healthcare provider   Other support network: Pharmacy             Follow-up: 1 month(s)     Verona Levine, Pharmacy Technician  Specialty Pharmacy Technician

## 2023-10-06 NOTE — ASSESSMENT & PLAN NOTE
Physical Therapy Evaluation    Visit Type: Initial Evaluation -  Daily Treatment Note  Visit: 1  Referring Provider: Tianna Collins MD  Medical Diagnosis (from order): Diagnosis Information    Diagnosis  N81.89 (ICD-10-CM) - Pelvic floor weakness       Treatment Diagnosis: pelvic health - increased pain/symptoms, impaired strength, impaired activity tolerance, impaired sexual health, impaired tissue mobility and impaired muscle length/flexibility.  Onset  - Date of onset:  consulted with physician 9/27/2023    SUBJECTIVE                                                                                                               Patient states she had her first baby 3 months ago via vaginal delivery. She feels like her \"insides are going to fall out.\" Delivery was not complicated. Patient had a second degree tear. She is feeling like she has to clench her muscles all the time. She feels like her vaginal is going to fall out. She feels like things look different. Workouts feel different. She denies pain or pressure in the vagina or pelvic floor. She does not see a bulge in the vagina.   Patient returned to working out about 6 weeks after delivery. She has eased into activities.   Patient has mild and rare bladder leaking episodes. No changes in bowel movements. Patient is currently breastfeeding.   [place] is painful. She is now afraid to try penetration due to feeling a ripping pain at entry.  Patient works as a hospice nurse.     Pain / Symptoms  - Patient denies pain / symptoms.  - Quality / Description:        • Bladder: bulge    Function:   Limitations / Exacerbation Factors:   - Patient reports pain with function reported below.  - , stairs, during sex and insertive intercourse  Prior Level of Function: onset following child birth. no limitation in involved area,    Patient Goals: decreased pain and decrease symptoms.    Prior treatment  - no therapies  - Discharged from hospital, home health, or skilled  Psychological condition is improving with treatment.  Continue current treatment regimen.  Psychological condition  will be reassessed at the next regular appointment.   nursing facility in last 30 days: no  Home Environment   - Patient lives with: significant other and young children  - Assistance available: as needed  - Denies 2 or more falls or an unexplained fall with injury in the last year.  - Feel safe at home / work / school: yes      OBJECTIVE                                                                                                                    Observation   Sitting comfortably               Pelvic Health  Internal Vaginal Exam  - Ischiocavernosus:        • Left: tenderness and tightness       • Right: tenderness and tightness  - Superficial transverse perineal:        • Left: tightness and tenderness       • Right: tenderness and tightness  -  Bulbocavernosus:        • Left: tightness and tenderness       • Right: tenderness and tightness  Pelvic Floor  Strength testing not formally assessed due to tightness and pain    Outcome/Assessments  Pelvic Floor Distress Inventory - 20: 58 (scored 0-300, higher score indicates higher impairment)       Treatment     Therapeutic Exercise  - happy baby x 1 minute  - lower trunk rotation x 10  - butterfly stretching x 1 minute  - hip rotator stretching with 1 minute holds each way, bilateral (4 minutes total)  - supine hip flexor stretching x 1 minute each side    Manual Therapy   Soft tissue mobilization to Pelvic floor muscles until softening noted  Gentle introitus stretching    Skilled input: verbal instruction/cues, tactile instruction/cues and as detailed above    Writer verbally educated and received verbal consent for hand placement, positioning of patient, and techniques to be performed today from patient for clothing adjustments for techniques, hand placement and palpation for techniques and therapist position for techniques as described above and how they are pertinent to the patient's plan of care.  Verbal consent received today for internal, external and external visualization pelvic floor muscle assessment and  treatment.   Patient provided continued consent during evaluation and treatment.  Home Exercise Program  Access Code: [email or web address]  URL: [email or web address]  Date: 10/09/2023  Prepared by: Gloria Castro    Exercises  - Lower Trunk Rotations  - 1-2 x daily - 10 reps  - Supine Pelvic Floor Stretch  - 1 x daily - 1 sets - 1-2 reps - 60 second hold  - Modified Mitchel Stretch  - 1-2 x daily - 1-2 reps - 1 minute hold  - Seated Hip External Rotation Stretch  - 1 x daily - 1-2 reps - 30 second hold  - Seated Piriformis Stretch  - 1 x daily - 1-2 reps - 30 second hold  - Butterfly Groin Stretch  - 1-2 x daily - 1-2 reps        ASSESSMENT                                                                                                          27 year old patient has reported functional limitations listed above impacted by signs and symptoms consistent with treatment diagnosis below.  Treatment Diagnosis:   - Involved: pelvic health.  - Symptoms/impairments: increased pain/symptoms, impaired strength, impaired activity tolerance, impaired sexual health, impaired tissue mobility and impaired muscle length/flexibility.    Patient complains of feeling discomfort in the pelvic floor after a vaginal delivery. She demonstrates significant tightness with tenderness at the introitus and in the Pelvic floor muscles. Patient responded well to initial treatment today and tightness softened with manual therapy. Introduced stretching exercises and patient responded well to these. Home program provided and patient encouraged to stretch daily.     Prognosis: Patient will benefit from skilled therapy.  Rehabilitative potential is: good.  Predicted patient presentation: Low (stable) - Patient comorbidities and complexities, as defined above, will have little effect on progress for prescribed plan of care.  Education:   - Present and ready to learn: patient  - Results of above outlined education: Verbalizes  understanding    PLAN                                                                                                                         The following skilled interventions to be implemented to achieve goals listed below:  Neuromuscular Re-Education (54204)  Therapeutic Activity (74145)  Therapeutic Exercise (21406)  Manual Therapy (96723)  Activities of Daily Living/Self Care (14450)    Frequency / Duration  1 times per week tapering as patient progresses for 6 weeks for an estimated total of 5 visits    Patient involved in and agreed to plan of care and goals.    Suggestions for next session as indicated: Progress per plan of care      Goals  No pain with vaginal penetration  No pain with Genecology exams  Reduce tightness and pain    The above improvements in impairments to assist in obtaining goals listed below  Long Term Goals: to be met by end of plan of care  1. patient will tolerate full gynecological exam without pain.  2. patient will be independent with pelvic floor stretching in order to maintain progress and improvements in flexibility contributing to reduced pain.  3. patient will return to desired sexual activity without pain   4. Patient will walk up and down stairs without discomfort to enable her to complete work and household tasks without limitation.       Therapy procedure time and total treatment time can be found documented on the Time Entry flowsheet

## 2023-10-10 ENCOUNTER — SPECIALTY PHARMACY (OUTPATIENT)
Dept: ONCOLOGY | Facility: HOSPITAL | Age: 73
End: 2023-10-10
Payer: MEDICARE

## 2023-10-10 NOTE — PROGRESS NOTES
Specialty Pharmacy Refill Coordination Note     Odalis is a 73 y.o. female contacted today regarding refills of  Dimethyl Fumarate specialty medication(s).    Reviewed and verified with patient:       Specialty medication(s) and dose(s) confirmed: yes    Refill Questions      Flowsheet Row Most Recent Value   Changes to allergies? No   Changes to medications? No   New conditions since last clinic visit No   Unplanned office visit, urgent care, ED, or hospital admission in the last 4 weeks  No   How does patient/caregiver feel medication is working? Excellent   Financial problems or insurance changes  No   If yes, describe changes in insurance or financial issues. N/A   Since the previous refill, were any specialty medication doses or scheduled injections missed or delayed?  No   If yes, please provide the amount N/A   Why were doses missed? N/A   Does this patient require a clinical escalation to a pharmacist? No            Delivery Questions      Flowsheet Row Most Recent Value   Delivery method FedEx   Delivery address correct? Yes   Preferred delivery time? Anytime   Number of medications in delivery 1   Medication(s) being filled and delivered Dimethyl Fumarate (Multiple Sclerosis Agents)   Doses left of specialty medications around a week left   Is there any medication that is due not being filled? No   Supplies needed? No supplies needed   Cooler needed? No   Do any medications need mixed or dated? No   Copay form of payment Payment plan already set up   Additional comments Dimethyl Fumarate=$0   Questions or concerns for the pharmacist? No   Are any medications first time fills? No              Medication Adherence          Adherence tools used: directed education      Support network for adherence: healthcare provider   Other support network: Pharmacy             Follow-up: 1 month(s)     Verona Levine, Pharmacy Technician  Specialty Pharmacy Technician

## 2023-11-02 ENCOUNTER — SPECIALTY PHARMACY (OUTPATIENT)
Dept: ONCOLOGY | Facility: HOSPITAL | Age: 73
End: 2023-11-02
Payer: MEDICARE

## 2023-11-02 RX ORDER — DIMETHYL FUMARATE 240 MG/1
240 CAPSULE ORAL 2 TIMES DAILY
Qty: 60 CAPSULE | Refills: 0 | Status: SHIPPED | OUTPATIENT
Start: 2023-11-02

## 2023-11-02 NOTE — PROGRESS NOTES
Specialty Pharmacy Refill Coordination Note     Odalis is a 73 y.o. female contacted today regarding refills of  dimethyl fumarate specialty medication(s).    Reviewed and verified with patient:  Allergies  Meds  Problems       Specialty medication(s) and dose(s) confirmed: yes    Refill Questions      Flowsheet Row Most Recent Value   Changes to allergies? No   Changes to medications? No   New conditions since last clinic visit No   Unplanned office visit, urgent care, ED, or hospital admission in the last 4 weeks  No   Financial problems or insurance changes  No   If yes, describe changes in insurance or financial issues. N/A   Since the previous refill, were any specialty medication doses or scheduled injections missed or delayed?  No   If yes, please provide the amount N/A   Why were doses missed? N/A   Does this patient require a clinical escalation to a pharmacist? No            Delivery Questions      Flowsheet Row Most Recent Value   Delivery method FedEx   Delivery address correct? Yes   Preferred delivery time? Anytime   Medication(s) being filled and delivered Dimethyl Fumarate (Multiple Sclerosis Agents)   Doses left of specialty medications around a week left   Is there any medication that is due not being filled? No   Supplies needed? No supplies needed   Cooler needed? No   Do any medications need mixed or dated? No   Copay form of payment Payment plan already set up   Additional comments Dimethyl Fumarate=$0   Questions or concerns for the pharmacist? No   Explain any questions or concerns for the pharmacist n/a   Are any medications first time fills? No              Medication Adherence          Adherence tools used: directed education      Support network for adherence: healthcare provider   Other support network: Pharmacy             Follow-up: 25 day(s)     Elvia Mallory, PegD

## 2023-11-02 NOTE — PROGRESS NOTES
Specialty Pharmacy Patient Management Program  Neurology Reassessment     Odalis Mir is a 73 y.o. female with multiple sclerosis seen by a Neurology provider and enrolled in the Neurology Patient Management program offered by [facility].  A follow-up outreach was conducted, including assessment of continued therapy appropriateness, medication adherence, and side effect incidence and management for dimethyl fumarate.     Changes to Insurance Coverage or Financial Support  [employer]     Relevant Past Medical History and Comorbidities  Relevant medical history and concomitant health conditions were discussed with the patient. The patient's chart has been reviewed for relevant past medical history and comorbid health conditions and updated as necessary.   Past Medical History:   Diagnosis Date    Anxiety and depression     Elevated cholesterol     GERD (gastroesophageal reflux disease)     Hypertension     Multiple sclerosis     Osteoarthritis     Seizure      Social History     Socioeconomic History    Marital status: Single   Tobacco Use    Smoking status: Never    Smokeless tobacco: Never   Substance and Sexual Activity    Alcohol use: No    Drug use: No    Sexual activity: Defer     Problem list reviewed by Elvia Mallory, PharmD on 11/2/2023 at 11:50 AM    Hospitalizations and Urgent Care Since Last Assessment  ED Visits, Admissions, or Hospitalizations: none   Urgent Office Visits: none     Allergies  Known allergies and reactions were discussed with the patient. The patient's chart has been reviewed for allergy information and updated as necessary.   Allergies   Allergen Reactions    Shellfish-Derived Products Anaphylaxis    Sulfa Antibiotics Anaphylaxis     Allergies reviewed by Elvia Mallory, PharmD on 11/2/2023 at 11:38 AM    Relevant Laboratory Values      Lab Assessment.   Labs are over due - scheduled to have drawn on 11/30/23 at next provider's appt.  Current  Medication List  This medication list has been reviewed with the patient and evaluated for any interactions or necessary modifications/recommendations, and updated to include all prescription medications, OTC medications, and supplements the patient is currently taking.  This list reflects what is contained in the patient's profile, which has also been marked as reviewed to communicate to other providers it is the most up to date version of the patient's current medication therapy.     Current Outpatient Medications:     amLODIPine (NORVASC) 10 MG tablet, Take  by mouth Daily., Disp: , Rfl:     carvedilol (COREG) 25 MG tablet, Take 1 tablet by mouth 2 (Two) Times a Day With Meals., Disp: , Rfl:     cetirizine (zyrTEC) 10 MG tablet, Take 1 tablet by mouth Daily., Disp: , Rfl:     Dimethyl Fumarate (Tecfidera) capsule delayed-release, Take 1 capsule by mouth 2 (Two) Times a Day., Disp: 60 capsule, Rfl: 1    FLUoxetine (PROzac) 60 MG tablet, Take  by mouth Daily., Disp: , Rfl:     gabapentin (NEURONTIN) 300 MG capsule, Take 2 capsules by mouth Every Night., Disp: , Rfl:     montelukast (SINGULAIR) 10 MG tablet, Take 1 tablet by mouth Every Night., Disp: , Rfl:     Multiple Vitamins-Minerals (CENTRUM SILVER PO), Take 1 tablet by mouth Daily., Disp: , Rfl:     polyethylene glycol (MIRALAX) packet, Take 17 g by mouth As Needed (constipation)., Disp: , Rfl:     Medicines reviewed by Elvia Mallory, PharmD on 11/2/2023 at 11:42 AM    Drug Interactions  No relevant drug-drug interactions noted     Adverse Drug Reactions  Medication tolerability: Tolerating with no to minimal ADRs          Medication plan: Continue therapy with normal follow-up  Plan for ADR Management: not required      Adherence, Self-Administration, and Current Therapy Problems  Adherence related patient's specialty therapy was discussed with the patient. The Adherence segment of this outreach has been reviewed and updated.   Adherence Questions  Linked  Medication(s) Assessed: Dimethyl Fumarate (Multiple Sclerosis Agents)  On average, how many doses/injections does the patient miss per month?: 0  What are the identified reasons for non-adherence or missed doses? : no problems identfied  What is the estimated medication adherence level?: %  Based on the patient/caregiver response and refill history, does this patient require an MTP to track adherence improvements?: no    Additional Barriers to Patient Self-Administration: none  Methods for Supporting Patient Self-Administration: n/a    Recently Close Medication Therapy Problems  No medication therapy recommendations to display  Open Medication Therapy Problems  No medication therapy recommendations to display     Goals of Therapy  Goals related to the patient's specialty therapy was discussed with the patient. The Patient Goals segment of this outreach has been reviewed and updated.    Goals Addressed Today        Specialty Pharmacy General Goal      Control of MS exacerbation - reduce frequency and severity of relapses (no signs or symptoms of relapsed MS)               Quality of Life Assessment   Quality of Life related to the patient's enrollment in the patient management program and services provided was discussed with the patient. The QOL segment of this outreach has been reviewed and updated.   Quality of Life Improvement Scale: Moderately better    Reassessment Plan & Follow-Up  Medication Therapy Changes: continue dimethyl fumarate 240 mg po bid  Related Plans, Therapy Recommendations, or Issues to Be Addressed: Pt is overdue for follow-up appt and lab work.  Pt's next appt is 11/30/23, but cancelled and rescheduled several of her previous appts.  Discussed with patient that we can't continue filling her dimethyl fumarate if she doesn't see the provider and have labs drawn.    Pharmacist to perform regular reassessments no more than (6) months from the previous assessment.  Care Coordinator to set up  future refill outreaches, coordinate prescription delivery, and escalate clinical questions to pharmacist.     Attestation  Therapeutic appropriateness: Appropriate  I attest the patient was actively involved in and has agreed to the above plan of care. If the prescribed therapy is at any point deemed not appropriate based on the current or future assessments, a consultation will be initiated with the patient's specialty care provider to determine the best course of action. The revised plan of therapy will be documented along with any additional patient education provided. Discussed aforementioned material with patient via telemedicine.    Elvia Mallory, PharmD, [facility]  Clinic Specialty Pharmacist, Neurology  11/2/2023  11:56 EDT

## 2023-11-08 ENCOUNTER — TELEPHONE (OUTPATIENT)
Age: 73
End: 2023-11-08
Payer: MEDICARE

## 2023-11-08 NOTE — TELEPHONE ENCOUNTER
Caller: Mundo Mir    Relationship: Self    Best call back number [phone]    What is the best time to reach you: ANYTIME    Who are you requesting to speak with (clinical staff, provider,  specific staff member): MOUNIKA SUE     Do you know the name of the person who called: MUNDO     What was the call regarding: PATIENT IS REQUESTING A CALL BACK  FROM DR SUE.    Is it okay if the provider responds through [facility]: NO PLEASE CALL

## 2023-11-09 NOTE — TELEPHONE ENCOUNTER
Spoke with patient. She has not been seen for awhile. She has a new caregiver. Her chronic pain is better. She has had worse depression since we last saw one another. I offered to see her sooner for her first visit (had availability this week), but she thought Dec appt would be ok. She said that she would call if needed us sooner, but would like to work on mood at our first appointment.

## 2023-11-30 ENCOUNTER — LAB (OUTPATIENT)
Dept: LAB | Facility: HOSPITAL | Age: 73
End: 2023-11-30
Payer: MEDICARE

## 2023-11-30 ENCOUNTER — OFFICE VISIT (OUTPATIENT)
Dept: NEUROLOGY | Facility: CLINIC | Age: 73
End: 2023-11-30
Payer: MEDICARE

## 2023-11-30 VITALS
BODY MASS INDEX: 31.08 KG/M2 | HEIGHT: 63 IN | HEART RATE: 68 BPM | WEIGHT: 175.4 LBS | DIASTOLIC BLOOD PRESSURE: 88 MMHG | SYSTOLIC BLOOD PRESSURE: 138 MMHG | OXYGEN SATURATION: 94 %

## 2023-11-30 DIAGNOSIS — G35 MULTIPLE SCLEROSIS: ICD-10-CM

## 2023-11-30 DIAGNOSIS — G35 MULTIPLE SCLEROSIS: Primary | ICD-10-CM

## 2023-11-30 DIAGNOSIS — R26.9 GAIT ABNORMALITY: ICD-10-CM

## 2023-11-30 DIAGNOSIS — F48.2 PBA (PSEUDOBULBAR AFFECT): ICD-10-CM

## 2023-11-30 DIAGNOSIS — F33.1 MODERATE EPISODE OF RECURRENT MAJOR DEPRESSIVE DISORDER: ICD-10-CM

## 2023-11-30 DIAGNOSIS — G40.109 SEIZURE, TEMPORAL LOBE: ICD-10-CM

## 2023-11-30 LAB
ALBUMIN SERPL-MCNC: 4.5 G/DL (ref 3.5–5.2)
ALBUMIN/GLOB SERPL: 1.6 G/DL
ALP SERPL-CCNC: 79 U/L (ref 39–117)
ALT SERPL W P-5'-P-CCNC: 11 U/L (ref 1–33)
ANION GAP SERPL CALCULATED.3IONS-SCNC: 14 MMOL/L (ref 5–15)
AST SERPL-CCNC: 14 U/L (ref 1–32)
BASOPHILS # BLD AUTO: 0.04 10*3/MM3 (ref 0–0.2)
BASOPHILS NFR BLD AUTO: 0.6 % (ref 0–1.5)
BILIRUB SERPL-MCNC: 0.3 MG/DL (ref 0–1.2)
BUN SERPL-MCNC: 11 MG/DL (ref 8–23)
BUN/CREAT SERPL: 17.2 (ref 7–25)
CALCIUM SPEC-SCNC: 9.3 MG/DL (ref 8.6–10.5)
CHLORIDE SERPL-SCNC: 102 MMOL/L (ref 98–107)
CO2 SERPL-SCNC: 24 MMOL/L (ref 22–29)
CREAT SERPL-MCNC: 0.64 MG/DL (ref 0.57–1)
DEPRECATED RDW RBC AUTO: 44.4 FL (ref 37–54)
EGFRCR SERPLBLD CKD-EPI 2021: 92.9 ML/MIN/1.73
EOSINOPHIL # BLD AUTO: 0.06 10*3/MM3 (ref 0–0.4)
EOSINOPHIL NFR BLD AUTO: 0.9 % (ref 0.3–6.2)
ERYTHROCYTE [DISTWIDTH] IN BLOOD BY AUTOMATED COUNT: 13.6 % (ref 12.3–15.4)
GLOBULIN UR ELPH-MCNC: 2.8 GM/DL
GLUCOSE SERPL-MCNC: 97 MG/DL (ref 65–99)
HCT VFR BLD AUTO: 39.5 % (ref 34–46.6)
HGB BLD-MCNC: 13.6 G/DL (ref 12–15.9)
IMM GRANULOCYTES # BLD AUTO: 0.02 10*3/MM3 (ref 0–0.05)
IMM GRANULOCYTES NFR BLD AUTO: 0.3 % (ref 0–0.5)
LYMPHOCYTES # BLD AUTO: 0.58 10*3/MM3 (ref 0.7–3.1)
LYMPHOCYTES NFR BLD AUTO: 9.1 % (ref 19.6–45.3)
MCH RBC QN AUTO: 31 PG (ref 26.6–33)
MCHC RBC AUTO-ENTMCNC: 34.4 G/DL (ref 31.5–35.7)
MCV RBC AUTO: 90 FL (ref 79–97)
MONOCYTES # BLD AUTO: 0.41 10*3/MM3 (ref 0.1–0.9)
MONOCYTES NFR BLD AUTO: 6.5 % (ref 5–12)
NEUTROPHILS NFR BLD AUTO: 5.24 10*3/MM3 (ref 1.7–7)
NEUTROPHILS NFR BLD AUTO: 82.6 % (ref 42.7–76)
NRBC BLD AUTO-RTO: 0 /100 WBC (ref 0–0.2)
PLATELET # BLD AUTO: 254 10*3/MM3 (ref 140–450)
PMV BLD AUTO: 9.9 FL (ref 6–12)
POTASSIUM SERPL-SCNC: 3.6 MMOL/L (ref 3.5–5.2)
PROT SERPL-MCNC: 7.3 G/DL (ref 6–8.5)
RBC # BLD AUTO: 4.39 10*6/MM3 (ref 3.77–5.28)
SODIUM SERPL-SCNC: 140 MMOL/L (ref 136–145)
WBC NRBC COR # BLD AUTO: 6.35 10*3/MM3 (ref 3.4–10.8)

## 2023-11-30 PROCEDURE — 1159F MED LIST DOCD IN RCRD: CPT | Performed by: PSYCHIATRY & NEUROLOGY

## 2023-11-30 PROCEDURE — 80053 COMPREHEN METABOLIC PANEL: CPT

## 2023-11-30 PROCEDURE — 85025 COMPLETE CBC W/AUTO DIFF WBC: CPT

## 2023-11-30 PROCEDURE — 99214 OFFICE O/P EST MOD 30 MIN: CPT | Performed by: PSYCHIATRY & NEUROLOGY

## 2023-11-30 PROCEDURE — 36415 COLL VENOUS BLD VENIPUNCTURE: CPT

## 2023-11-30 PROCEDURE — 1160F RVW MEDS BY RX/DR IN RCRD: CPT | Performed by: PSYCHIATRY & NEUROLOGY

## 2023-11-30 RX ORDER — CARBAMAZEPINE 200 MG/1
200 TABLET ORAL 3 TIMES DAILY
Qty: 270 TABLET | Refills: 3 | Status: SHIPPED | OUTPATIENT
Start: 2023-11-30

## 2023-11-30 RX ORDER — DIPHENHYDRAMINE HCL 25 MG
25 CAPSULE ORAL EVERY 6 HOURS PRN
COMMUNITY
End: 2023-11-30

## 2023-11-30 RX ORDER — ATORVASTATIN CALCIUM 40 MG/1
40 TABLET, FILM COATED ORAL DAILY
COMMUNITY

## 2023-11-30 RX ORDER — CARBAMAZEPINE 200 MG/1
200 TABLET ORAL 3 TIMES DAILY
COMMUNITY
End: 2023-11-30 | Stop reason: SDUPTHER

## 2023-11-30 RX ORDER — VALSARTAN 320 MG/1
320 TABLET ORAL DAILY
COMMUNITY
Start: 2023-10-06

## 2023-11-30 RX ORDER — FLUOXETINE HYDROCHLORIDE 60 MG/1
60 TABLET, FILM COATED ORAL; ORAL DAILY
Qty: 90 TABLET | Refills: 3 | Status: SHIPPED | OUTPATIENT
Start: 2023-11-30
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
## 2023-12-07 ENCOUNTER — LAB (OUTPATIENT)
Age: 73
End: 2023-12-07
Payer: MEDICARE

## 2023-12-07 ENCOUNTER — OFFICE VISIT (OUTPATIENT)
Age: 73
End: 2023-12-07
Payer: MEDICARE

## 2023-12-07 VITALS
HEIGHT: 63 IN | RESPIRATION RATE: 14 BRPM | DIASTOLIC BLOOD PRESSURE: 80 MMHG | WEIGHT: 175 LBS | OXYGEN SATURATION: 95 % | BODY MASS INDEX: 31.01 KG/M2 | HEART RATE: 68 BPM | SYSTOLIC BLOOD PRESSURE: 138 MMHG

## 2023-12-07 DIAGNOSIS — G62.9 PERIPHERAL POLYNEUROPATHY: ICD-10-CM

## 2023-12-07 DIAGNOSIS — E78.49 OTHER HYPERLIPIDEMIA: ICD-10-CM

## 2023-12-07 DIAGNOSIS — J30.9 ALLERGIC RHINITIS, UNSPECIFIED SEASONALITY, UNSPECIFIED TRIGGER: ICD-10-CM

## 2023-12-07 DIAGNOSIS — F41.9 ANXIETY AND DEPRESSION: ICD-10-CM

## 2023-12-07 DIAGNOSIS — Z23 ENCOUNTER FOR ADMINISTRATION OF VACCINE: ICD-10-CM

## 2023-12-07 DIAGNOSIS — R73.03 PRE-DIABETES: ICD-10-CM

## 2023-12-07 DIAGNOSIS — F41.1 GENERALIZED ANXIETY DISORDER: ICD-10-CM

## 2023-12-07 DIAGNOSIS — E55.9 VITAMIN D DEFICIENCY: ICD-10-CM

## 2023-12-07 DIAGNOSIS — R73.03 PREDIABETES: Primary | ICD-10-CM

## 2023-12-07 DIAGNOSIS — I10 PRIMARY HYPERTENSION: ICD-10-CM

## 2023-12-07 DIAGNOSIS — K63.5 POLYP OF COLON, UNSPECIFIED PART OF COLON, UNSPECIFIED TYPE: ICD-10-CM

## 2023-12-07 DIAGNOSIS — F32.A ANXIETY AND DEPRESSION: ICD-10-CM

## 2023-12-07 DIAGNOSIS — Z79.899 MEDICATION MANAGEMENT: ICD-10-CM

## 2023-12-07 DIAGNOSIS — K21.9 GASTROESOPHAGEAL REFLUX DISEASE WITHOUT ESOPHAGITIS: ICD-10-CM

## 2023-12-07 DIAGNOSIS — K59.00 CONSTIPATION, UNSPECIFIED CONSTIPATION TYPE: ICD-10-CM

## 2023-12-07 DIAGNOSIS — R73.03 PREDIABETES: ICD-10-CM

## 2023-12-07 PROBLEM — I26.99 PULMONARY EMBOLUS: Status: ACTIVE | Noted: 2022-06-17

## 2023-12-07 PROBLEM — M81.0 OSTEOPOROSIS: Status: ACTIVE | Noted: 2022-10-10

## 2023-12-07 LAB
CHOLEST SERPL-MCNC: 193 MG/DL (ref 0–200)
HDLC SERPL-MCNC: 69 MG/DL (ref 40–60)
LDLC SERPL CALC-MCNC: 105 MG/DL (ref 0–100)
LDLC/HDLC SERPL: 1.48 {RATIO}
TRIGL SERPL-MCNC: 109 MG/DL (ref 0–150)
VLDLC SERPL-MCNC: 19 MG/DL (ref 5–40)

## 2023-12-07 PROCEDURE — 80061 LIPID PANEL: CPT | Performed by: INTERNAL MEDICINE

## 2023-12-07 PROCEDURE — 36415 COLL VENOUS BLD VENIPUNCTURE: CPT | Performed by: INTERNAL MEDICINE

## 2023-12-07 PROCEDURE — 84443 ASSAY THYROID STIM HORMONE: CPT | Performed by: INTERNAL MEDICINE

## 2023-12-07 PROCEDURE — 84439 ASSAY OF FREE THYROXINE: CPT | Performed by: INTERNAL MEDICINE

## 2023-12-07 PROCEDURE — 83036 HEMOGLOBIN GLYCOSYLATED A1C: CPT | Performed by: INTERNAL MEDICINE

## 2023-12-07 PROCEDURE — 82607 VITAMIN B-12: CPT | Performed by: INTERNAL MEDICINE

## 2023-12-07 PROCEDURE — 82306 VITAMIN D 25 HYDROXY: CPT | Performed by: INTERNAL MEDICINE

## 2023-12-07 RX ORDER — AMLODIPINE BESYLATE 10 MG/1
10 TABLET ORAL DAILY
Qty: 90 TABLET | Refills: 1 | Status: SHIPPED | OUTPATIENT
Start: 2023-12-07

## 2023-12-07 RX ORDER — POLYETHYLENE GLYCOL 3350 17 G/17G
17 POWDER, FOR SOLUTION ORAL DAILY
Qty: 510 G | Refills: 1 | Status: SHIPPED | OUTPATIENT
Start: 2023-12-07

## 2023-12-07 RX ORDER — OMEPRAZOLE 20 MG/1
20 CAPSULE, DELAYED RELEASE ORAL DAILY
Qty: 90 CAPSULE | Refills: 1 | Status: SHIPPED | OUTPATIENT
Start: 2023-12-07

## 2023-12-07 RX ORDER — VALSARTAN 320 MG/1
320 TABLET ORAL DAILY
Qty: 90 TABLET | Refills: 1 | Status: SHIPPED | OUTPATIENT
Start: 2023-12-07

## 2023-12-07 RX ORDER — MONTELUKAST SODIUM 10 MG/1
10 TABLET ORAL NIGHTLY
Qty: 90 TABLET | Refills: 1 | Status: SHIPPED | OUTPATIENT
Start: 2023-12-07

## 2023-12-07 RX ORDER — EPINEPHRINE 0.3 MG/.3ML
0.3 INJECTION SUBCUTANEOUS ONCE
COMMUNITY

## 2023-12-07 RX ORDER — GABAPENTIN 300 MG/1
300 CAPSULE ORAL 3 TIMES DAILY
Qty: 90 CAPSULE | Refills: 0 | Status: SHIPPED | OUTPATIENT
Start: 2023-12-07

## 2023-12-07 RX ORDER — ATORVASTATIN CALCIUM 40 MG/1
40 TABLET, FILM COATED ORAL NIGHTLY
Qty: 90 TABLET | Refills: 1 | Status: SHIPPED | OUTPATIENT
Start: 2023-12-07

## 2023-12-07 RX ORDER — NITROGLYCERIN 0.4 MG/1
0.4 TABLET SUBLINGUAL
COMMUNITY

## 2023-12-07 RX ORDER — CARVEDILOL 25 MG/1
25 TABLET ORAL 2 TIMES DAILY WITH MEALS
Qty: 180 TABLET | Refills: 1 | Status: SHIPPED | OUTPATIENT
Start: 2023-12-07

## 2023-12-07 RX ORDER — BUSPIRONE HYDROCHLORIDE 7.5 MG/1
7.5 TABLET ORAL 2 TIMES DAILY
Qty: 60 TABLET | Refills: 1 | Status: SHIPPED | OUTPATIENT
Start: 2023-12-07

## 2023-12-07 NOTE — LETTER
[facility]  Vaccine Consent Form    Patient Name:  Odalis Mir  Patient :  1950     Vaccine(s) Ordered    Pneumococcal Conjugate Vaccine 20-Valent All          Screening Checklist  The following questions should be completed prior to vaccination. If you answer “yes” to any question, it does not necessarily mean you should not be vaccinated. It just means we may need to clarify or ask more questions. If a question is unclear, please ask your healthcare provider to explain it.    Yes No   Any fever or moderate to severe illness today (mild illness and/or antibiotic treatment are not contraindications)?     Do you have a history of a serious reaction to any previous vaccinations, such as anaphylaxis, encephalopathy within 7 days, Guillain-[place] syndrome within 6 weeks, seizure?     Have you received any live vaccine(s) (e.g MMR, VENANCIO) or any other vaccines in the last month (to ensure duplicate doses aren't given)?     Do you have an anaphylactic allergy to latex (DTaP, DTaP-IPV, Hep A, Hep B, MenB, RV, Td, Tdap), baker’s yeast (Hep B, HPV), polysorbates (RSV, nirsevimab, PCV 20, Rotavirrus, Tdap, Shingrix), or gelatin (VENANCIO, MMR)?     Do you have an anaphylactic allergy to neomycin (Rabies, VENANCIO, MMR, IPV, Hep A), polymyxin B (IPV), or streptomycin (IPV)?      Any cancer, leukemia, AIDS, or other immune system disorder? (VENANCIO, MMR, RV)     Do you have a parent, brother, or sister with an immune system problem (if immune competence of vaccine recipient clinically verified, can proceed)? (MMR, VENANCIO)     Any recent steroid treatments for >2 weeks, chemotherapy, or radiation treatment? (VENANCIO, MMR)     Have you received antibody-containing blood transfusions or IVIG in the past 11 months (recommended interval is dependent on product)? (MMR, VENANCIO)     Have you taken antiviral drugs (acyclovir, famciclovir, valacyclovir for VENANCIO) in the last 24 or 48 hours, respectively?      Are you pregnant or planning to become  pregnant within 1 month? (VENANCIO, MMR, HPV, IPV, MenB, Abrexvy; For Hep B- refer to Engerix-B; For RSV - Abrysvo is indicated for 32-36 weeks of pregnancy from September to January)     For infants, have you ever been told your child has had intussusception or a medical emergency involving obstruction of the intestine (Rotavirus)? If not for an infant, can skip this question.         *Ordering Physician/APC should be consulted if “yes” is checked by the patient or guardian above.      I have received, read, and understand the Vaccine Information Statement (VIS) for each vaccine ordered above.  I have considered my health status as well as the health status of my close contacts.  I have taken the opportunity to discuss my vaccine questions with my health care provider.   I have requested that the ordered vaccine(s) be given to me.  I understand the benefits and risks of the vaccines.  I understand that I should remain in the clinic for 15 minutes after receiving the vaccine(s).  _________________________________________________________  Signature of Patient or Parent/Legal Guardian ____________________  Date

## 2023-12-07 NOTE — PROGRESS NOTES
New Patient Note    Cleveland Jacobo is a 73 y.o. female.    Chief Complaint   Patient presents with    [facility] Care    Fall     Fell two days ago. Fell in apartment. Fell on the left side.     Back Pain    Shoulder Pain       HPI    HTN   - currently on Amlodipine and Coreg  - no CP, edema, HA     Depression/Anxiety  - previously on Prozac 40, Xanax, and Abilify  - Neuro recently stopped her Abilify due to pseudobulbar affect  - no SI/HI  - feels like anxiety is not well controlled and is most problematic  - her son and his wife just went back to [place] and she has really missed them, she has cried a lot and this has been going on for 1 week  - she has been with them for 6 months and now they will be gone for 6 months  - she does really like her new caregiver  - she does feel sad but is enjoying tv and reading  - not seeing the ladies from [employer]  - not really getting out and about  - would be willing to try Buspar but still not willing to commit to therapy       MS   - due to pseudobulbar affective disorder Tecfidera stopped per patient  - she was not placed on any new medication for MS  - weakness worst on the left and in LE  - using rolator when leaves apartment and using cane in her apartment  - she does not have any sensation issues right now other than chronic neuropathy in feet  - no longer using orthotic  - she had quit seeing all physicians but she got back in with Neurology and plan is for her to go back in 1 year  - fell day before yesterday which hurt her back and shoulder but she is improving and able to bear weight etc, doesn't feel like she needs imaging     Low Back Pain  - no longer on Hydrocodone and not really having significant back pain  - has been off for some time  - does have neuropathy in feet unclear source, no pain radiating down leg    HPL  - continues on statin    H/O of Seizure  - continues on Carbmazepine    Allergic Rhinitis  - continues on Singulair and  Zyrtec    Constipation  - using Miralax    GERD  - using Omeprazole in passed not now and having some reflux and burning, would like to resume    Osteoporosis   - previously on Fosamax, but not currently taking, will discuss more at next appointment    Past Medical History:  Patient Active Problem List   Diagnosis    Closed fracture of lower end of right radius with routine healing    Closed fracture of right distal radius    Moderate episode of recurrent major depressive disorder    PBA (pseudobulbar affect)    Seizure, temporal lobe    Gait abnormality    Chronic fatigue    Allergic rhinitis    Anxiety and depression    CAD (coronary artery disease)    Colon polyp    Gastroesophageal reflux disease without esophagitis    Hyperlipidemia    Hypertension    Multiple sclerosis, secondary progressive    Osteoporosis    Pre-diabetes    Pulmonary embolus    Spasticity    Urgency incontinence    Vitamin D deficiency        Medications:    Current Outpatient Medications:     amLODIPine (NORVASC) 10 MG tablet, Take 1 tablet by mouth Daily., Disp: 90 tablet, Rfl: 1    atorvastatin (LIPITOR) 40 MG tablet, Take 1 tablet by mouth Every Night., Disp: 90 tablet, Rfl: 1    carBAMazepine (TEGretol) 200 MG tablet, Take 1 tablet by mouth 3 (Three) Times a Day., Disp: 270 tablet, Rfl: 3    carvedilol (COREG) 25 MG tablet, Take 1 tablet by mouth 2 (Two) Times a Day With Meals., Disp: 180 tablet, Rfl: 1    cetirizine (zyrTEC) 10 MG tablet, Take 1 tablet by mouth Daily., Disp: , Rfl:     EPINEPHrine (EpiPen 2-Mic) 0.3 MG/0.3ML solution auto-injector injection, Inject 0.3 mL under the skin into the appropriate area as directed 1 (One) Time., Disp: , Rfl:     FLUoxetine (PROzac) 60 MG tablet, Take 1 tablet by mouth Daily., Disp: 90 tablet, Rfl: 3    gabapentin (NEURONTIN) 300 MG capsule, Take 1 capsule by mouth 3 (Three) Times a Day., Disp: 90 capsule, Rfl: 0    montelukast (SINGULAIR) 10 MG tablet, Take 1 tablet by mouth Every Night.,  Disp: 90 tablet, Rfl: 1    polyethylene glycol (MIRALAX) packet, Take 17 g by mouth As Needed (constipation)., Disp: , Rfl:     valsartan (DIOVAN) 320 MG tablet, Take 1 tablet by mouth Daily., Disp: 90 tablet, Rfl: 1    busPIRone (BUSPAR) 7.5 MG tablet, Take 1 tablet by mouth 2 (Two) Times a Day., Disp: 60 tablet, Rfl: 1    nitroglycerin (Nitrostat) 0.4 MG SL tablet, Place 1 tablet under the tongue Every 5 (Five) Minutes As Needed. (Patient not taking: Reported on 12/7/2023), Disp: , Rfl:     omeprazole (priLOSEC) 20 MG capsule, Take 1 capsule by mouth Daily., Disp: 90 capsule, Rfl: 1    polyethylene glycol (MIRALAX) 17 GM/SCOOP powder, Take 17 g by mouth Daily., Disp: 510 g, Rfl: 1     Allergy to Medications:  Allergies   Allergen Reactions    Shellfish-Derived Products Anaphylaxis    Sulfa Antibiotics Anaphylaxis and Unknown - Low Severity        Immunizations:  Immunization History   Administered Date(s) Administered    COVID-19 (PFIZER) Purple Cap Monovalent 01/20/2021    DTaP 11/04/2019    Fluzone (or Fluarix & Flulaval for VFC) >6mos 10/21/2013, 08/31/2015    Fluzone High Dose =>65 Years ([facility] ONLY) 09/15/2016, 09/07/2018, 10/28/2019    Fluzone High-Dose 65+yrs 09/15/2016, 09/07/2018, 10/28/2019, 11/23/2021, 12/07/2023    Influenza Quad Vaccine (Inpatient) 10/21/2013    Influenza Seasonal Injectable 11/07/2007, 11/03/2008, 09/30/2010, 09/01/2017, 10/01/2019    Influenza, Unspecified 10/31/2011    Pneumococcal Conjugate 13-Valent (PCV13) 08/27/2012, 08/31/2015    Pneumococcal Conjugate 20-Valent (PCV20) 12/07/2023    Pneumococcal Polysaccharide (PPSV23) 08/27/2012    Tdap 01/30/2012, 10/01/2019       Surgeries:  Past Surgical History:   Procedure Laterality Date    APPENDECTOMY      COLONOSCOPY      EYE SURGERY Right     Bleeding behind the retina     HAND SURGERY Right     thumb    HYSTERECTOMY      ORIF WRIST FRACTURE Right 11/14/2018    Procedure: RIGHT WRIST INTRA  ARTICULAR  DISTAL RADIUS FRACTURE OPEN  "REDUCTION INTERNAL FIXATION;  Surgeon: El Parks MD;  Location: [facility];  Service: Orthopedics        Family History:  Family History   Problem Relation Age of Onset    COPD Mother     Hypertension Father     Alzheimer's disease Father     Dementia Father     Cancer Paternal Grandmother         Social:  Tobacco Use: never  ETOH Use:no  Drug Use:no  Household:Lives at [facility]  Occupation:  Hobbies:  Exercise:    Review of Systems    Objective   /80   Pulse 68   Resp 14   Ht 160 cm (62.99\")   Wt 79.4 kg (175 lb)   SpO2 95%   BMI 31.01 kg/m²        Physical Exam  Vitals reviewed.   Constitutional:       General: She is not in acute distress.     Appearance: Normal appearance.   HENT:      Mouth/Throat:      Mouth: Mucous membranes are moist.   Eyes:      Conjunctiva/sclera: Conjunctivae normal.   Cardiovascular:      Rate and Rhythm: Normal rate and regular rhythm.      Heart sounds: Normal heart sounds. No murmur heard.  Pulmonary:      Effort: Pulmonary effort is normal. No respiratory distress.      Breath sounds: Normal breath sounds.   Abdominal:      General: Abdomen is flat. Bowel sounds are normal.      Palpations: Abdomen is soft.   Neurological:      Mental Status: She is alert.      Motor: Weakness present.      Comments: Strength 4/5 on Left UE/LE though, LE less strength relatively to the UE, 5/5 on Right UE/LE, using rolator today   Psychiatric:      Comments: flat         Assessment & Plan     1. Secondary Progressive MS   - Following at [facility] with Neuro.   - Recently Tecfidera stopped. Did not tolerate Nudexta.   - Neuro previously had stopped Amitriptyline, Baclofen.   - FU with Neuro at [facility] in 1year    2. Prediabetes  - Hemoglobin A1c; Future    3. Other hyperlipidemia  - Lipid Panel; Future  - atorvastatin (LIPITOR) 40 MG tablet; Take 1 tablet by mouth Every Night.  Dispense: 90 tablet; Refill: 1    4. Vitamin D deficiency  - Vitamin " D,25-Hydroxy; Future    5. Peripheral polyneuropathy  - neuropathic pain in feet likely due to MS  - Vitamin B12; Future  - TSH+Free T4; Future  - gabapentin (NEURONTIN) 300 MG capsule; Take 1 capsule by mouth 3 (Three) Times a Day.  Dispense: 90 capsule; Refill: 0  - will continue longterm script  - signed contract  - sent UDS  - [facility] queried without irregularities    6. Encounter for administration of vaccine  - Flu vaccine  - Pneumococcal Conjugate Vaccine 20-Valent All    7. Generalized anxiety disorder, Depression  - uncontrolled  - TSH+Free T4; Future  - busPIRone (BUSPAR) 7.5 MG tablet; Take 1 tablet by mouth 2 (Two) Times a Day.  Dispense: 60 tablet; Refill: 1  - continue Prozac 40mg  - discussed therapy with Cecilia Suarez, will continue to think about talk therapy  - previously on Xanax and Hydroxyzine and no longer taking    8. Primary hypertension  - controlled  - amLODIPine (NORVASC) 10 MG tablet; Take 1 tablet by mouth Daily.  Dispense: 90 tablet; Refill: 1  - carvedilol (COREG) 25 MG tablet; Take 1 tablet by mouth 2 (Two) Times a Day With Meals.  Dispense: 180 tablet; Refill: 1  - valsartan (DIOVAN) 320 MG tablet; Take 1 tablet by mouth Daily.  Dispense: 90 tablet; Refill: 1  - previously on Hydralazine and Spironolactone which are no longer required    9. Allergic rhinitis, unspecified seasonality, unspecified trigger  - continue Zyrtec  - montelukast (SINGULAIR) 10 MG tablet; Take 1 tablet by mouth Every Night.  Dispense: 90 tablet; Refill: 1    10. Constipation, unspecified constipation type  - polyethylene glycol (MIRALAX) 17 GM/SCOOP powder; Take 17 g by mouth Daily.  Dispense: 510 g; Refill: 1    11. Gastroesophageal reflux disease without esophagitis  - uncontrolled, will resume medication  - omeprazole (priLOSEC) 20 MG capsule; Take 1 capsule by mouth Daily.  Dispense: 90 capsule; Refill: 1      12. Osteoporosis   - Last DEXA October 2018.   - Previously on  Vit D. Ca and Alendronate. Will  discuss at next visit  - will check vitamin d     13. Tubular Adenoma   - 10mm. Recommended fu in 3 years from 7/2019. Discuss next visit.      HCM  - mammo completed 5/2019. Discussed and not interesting in obtaining today  - cscope 7/2019, 10 mm tubular adenoma with 3 yr follow up  - will discuss Shingrix at next visit  - DEXA discuss next visit  - Discuss COVID vaccine at next viist     FU in 1mo     Irene Bailey MD, FAAP, FACP  Internal Medicine and Pediatrics  [facility]

## 2023-12-08 DIAGNOSIS — E55.9 VITAMIN D DEFICIENCY: Primary | ICD-10-CM

## 2023-12-08 DIAGNOSIS — E53.8 B12 DEFICIENCY: ICD-10-CM

## 2023-12-08 LAB
25(OH)D3 SERPL-MCNC: 12.9 NG/ML (ref 30–100)
HBA1C MFR BLD: 6.1 % (ref 4.8–5.6)
T4 FREE SERPL-MCNC: 0.86 NG/DL (ref 0.93–1.7)
TSH SERPL DL<=0.05 MIU/L-ACNC: 1.86 UIU/ML (ref 0.27–4.2)
VIT B12 BLD-MCNC: 271 PG/ML (ref 211–946)

## 2023-12-08 RX ORDER — ERGOCALCIFEROL 1.25 MG/1
50000 CAPSULE ORAL WEEKLY
Qty: 8 CAPSULE | Refills: 0 | Status: SHIPPED | OUTPATIENT
Start: 2023-12-08

## 2023-12-08 RX ORDER — LANOLIN ALCOHOL/MO/W.PET/CERES
1000 CREAM (GRAM) TOPICAL DAILY
Qty: 30 TABLET | Refills: 2 | Status: SHIPPED | OUTPATIENT
Start: 2023-12-08

## 2023-12-11 LAB
1OH-MIDAZOLAM UR QL SCN: NOT DETECTED NG/MG CREAT
6MAM UR QL SCN: NEGATIVE NG/ML
7AMINOCLONAZEPAM/CREAT UR: NOT DETECTED NG/MG CREAT
A-OH ALPRAZ/CREAT UR: NOT DETECTED NG/MG CREAT
A-OH-TRIAZOLAM/CREAT UR CFM: NOT DETECTED NG/MG CREAT
ACP UR QL CFM: NOT DETECTED
ALPRAZ/CREAT UR CFM: NOT DETECTED NG/MG CREAT
AMPHETAMINES UR QL SCN: NEGATIVE NG/ML
APAP UR QL SCN: NEGATIVE UG/ML
BARBITURATES UR QL SCN: NEGATIVE NG/ML
BENZODIAZ SCN METH UR: NEGATIVE
BUPRENORPHINE UR QL SCN: NEGATIVE
BUPRENORPHINE/CREAT UR: NOT DETECTED NG/MG CREAT
CANNABINOIDS UR QL SCN: NEGATIVE NG/ML
CARISOPRODOL UR QL: NEGATIVE NG/ML
CLONAZEPAM/CREAT UR CFM: NOT DETECTED NG/MG CREAT
COCAINE+BZE UR QL SCN: NEGATIVE NG/ML
CREAT UR-MCNC: 80 MG/DL
D-METHORPHAN UR-MCNC: NOT DETECTED NG/ML
D-METHORPHAN+LEVORPHANOL UR QL: NOT DETECTED
DESALKYLFLURAZ/CREAT UR: NOT DETECTED NG/MG CREAT
DIAZEPAM/CREAT UR: NOT DETECTED NG/MG CREAT
ETHANOL UR QL SCN: NEGATIVE G/DL
ETHANOL UR QL SCN: NEGATIVE NG/ML
FENTANYL CTO UR SCN-MCNC: NEGATIVE NG/ML
FENTANYL/CREAT UR: NOT DETECTED NG/MG CREAT
FLUNITRAZEPAM UR QL SCN: NOT DETECTED NG/MG CREAT
GABAPENTIN UR CFM-MCNC: PRESENT NG/ML
GABAPENTIN UR QL CFM: NORMAL
GABAPENTIN UR-MCNC: NORMAL UG/ML
HYPNOTICS UR QL SCN: NEGATIVE
KETAMINE UR QL: NOT DETECTED
LORAZEPAM/CREAT UR: NOT DETECTED NG/MG CREAT
MEPERIDINE UR QL SCN: NEGATIVE NG/ML
METHADONE UR QL SCN: NEGATIVE NG/ML
METHADONE+METAB UR QL SCN: NEGATIVE NG/ML
MIDAZOLAM/CREAT UR CFM: NOT DETECTED NG/MG CREAT
MISCELLANEOUS, UR: NEGATIVE
NORBUPRENORPHINE/CREAT UR: NOT DETECTED NG/MG CREAT
NORDIAZEPAM/CREAT UR: NOT DETECTED NG/MG CREAT
NORFENTANYL/CREAT UR: NOT DETECTED NG/MG CREAT
NORFLUNITRAZEPAM UR-MCNC: NOT DETECTED NG/MG CREAT
NORKETAMINE UR-MCNC: NOT DETECTED UG/ML
OPIATES UR SCN-MCNC: NEGATIVE NG/ML
OTHER HALLUCINOGENS UR: NEGATIVE
OXAZEPAM/CREAT UR: NOT DETECTED NG/MG CREAT
OXYCODONE CTO UR SCN-MCNC: NEGATIVE NG/ML
PCP UR QL SCN: NEGATIVE NG/ML
PRESCRIBED MEDICATIONS: NORMAL
PRESCRIBED MEDICATIONS: NORMAL
PROPOXYPH UR QL SCN: NEGATIVE NG/ML
TAPENTADOL CTO UR SCN-MCNC: NEGATIVE NG/ML
TEMAZEPAM/CREAT UR: NOT DETECTED NG/MG CREAT
TRAMADOL UR QL SCN: NEGATIVE NG/ML
ZALEPLON UR-MCNC: NOT DETECTED NG/ML
ZOLPIDEM PHENYL-4-CARB UR QL SCN: NOT DETECTED
ZOLPIDEM UR QL SCN: NOT DETECTED
ZOPICLONE-N-OXIDE UR-MCNC: NOT DETECTED NG/ML

## 2024-01-25 ENCOUNTER — OFFICE VISIT (OUTPATIENT)
Age: 74
End: 2024-01-25
Payer: MEDICARE

## 2024-01-25 VITALS
HEIGHT: 63 IN | BODY MASS INDEX: 31.89 KG/M2 | WEIGHT: 180 LBS | DIASTOLIC BLOOD PRESSURE: 82 MMHG | SYSTOLIC BLOOD PRESSURE: 126 MMHG | HEART RATE: 63 BPM | OXYGEN SATURATION: 95 %

## 2024-01-25 DIAGNOSIS — J30.9 ALLERGIC RHINITIS, UNSPECIFIED SEASONALITY, UNSPECIFIED TRIGGER: ICD-10-CM

## 2024-01-25 DIAGNOSIS — G35 MULTIPLE SCLEROSIS, SECONDARY PROGRESSIVE: ICD-10-CM

## 2024-01-25 DIAGNOSIS — Z12.31 ENCOUNTER FOR SCREENING MAMMOGRAM FOR MALIGNANT NEOPLASM OF BREAST: ICD-10-CM

## 2024-01-25 DIAGNOSIS — M81.0 AGE-RELATED OSTEOPOROSIS WITHOUT CURRENT PATHOLOGICAL FRACTURE: ICD-10-CM

## 2024-01-25 DIAGNOSIS — F32.A ANXIETY AND DEPRESSION: ICD-10-CM

## 2024-01-25 DIAGNOSIS — I10 PRIMARY HYPERTENSION: ICD-10-CM

## 2024-01-25 DIAGNOSIS — F41.1 GENERALIZED ANXIETY DISORDER: Primary | ICD-10-CM

## 2024-01-25 DIAGNOSIS — E55.9 VITAMIN D DEFICIENCY: ICD-10-CM

## 2024-01-25 DIAGNOSIS — K21.9 GASTROESOPHAGEAL REFLUX DISEASE WITHOUT ESOPHAGITIS: ICD-10-CM

## 2024-01-25 DIAGNOSIS — F41.9 ANXIETY AND DEPRESSION: ICD-10-CM

## 2024-01-25 DIAGNOSIS — F33.1 MODERATE EPISODE OF RECURRENT MAJOR DEPRESSIVE DISORDER: Chronic | ICD-10-CM

## 2024-01-25 DIAGNOSIS — E78.2 MIXED HYPERLIPIDEMIA: ICD-10-CM

## 2024-01-25 DIAGNOSIS — R73.03 PRE-DIABETES: ICD-10-CM

## 2024-01-25 RX ORDER — MONTELUKAST SODIUM 10 MG/1
10 TABLET ORAL NIGHTLY
Qty: 90 TABLET | Refills: 1 | Status: SHIPPED | OUTPATIENT
Start: 2024-01-25

## 2024-01-25 RX ORDER — BUSPIRONE HYDROCHLORIDE 7.5 MG/1
TABLET ORAL
Qty: 90 TABLET | Refills: 1 | Status: SHIPPED | OUTPATIENT
Start: 2024-01-25

## 2024-01-25 NOTE — PROGRESS NOTES
"Progress Note    Subjective      Odalis \"JESSIKA\" is a 73 y.o. female.    Chief Complaint   Patient presents with    Follow-up   Follow up for HTN, mood, MS    HPI  HTN   - currently on Amlodipine and Coreg  - no CP, edema, HA     Depression/Anxiety  - previously on Prozac 60, Xanax, and Abilify  - Neuro stopped her Abilify due to pseudobulbar affect concern   - no SI/HI  - feels like anxiety is not well controlled and is most problematic  - she has been worried about her sister that just had surgery on parathyroid  - she does really like her new caregiver  - she does feel sad but is enjoying tv and reading  - not seeing the ladies from [employer]  - not really getting out and about  - would be willing to try increase of Buspar but still not willing to commit to therapy    MS   - due to pseudobulbar affective disorder Tecfidera stopped per patient  - she was not placed on any new medication for MS  - weakness worst on the left and in LE  - using rolator when leaves apartment and using cane in her apartment  - she does not have any sensation issues right now other than chronic neuropathy in feet  - no longer using orthotic  - she had quit seeing all physicians but she got back in with Neurology and plan is for her to go back in 1 year     Low Back Pain  - no longer on Hydrocodone and not really having significant back pain  - has been off for some time  - does have neuropathy in feet unclear source, no pain radiating down leg     HPL  - continues on statin     H/O of Seizure  - continues on Carbmazepine     Allergic Rhinitis  - continues on Singulair and Zyrtec     Constipation  - using Miralax     GERD  - using Omeprazole again since last visit and feel that this has helped     Osteoporosis   - previously on Fosamax, but not currently taking     Past Medical History:  Patient Active Problem List   Diagnosis    Closed fracture of lower end of right radius with routine healing    Closed fracture of right distal radius    " Moderate episode of recurrent major depressive disorder    PBA (pseudobulbar affect)    Seizure, temporal lobe    Gait abnormality    Chronic fatigue    Allergic rhinitis    Anxiety and depression    CAD (coronary artery disease)    Colon polyp    Gastroesophageal reflux disease without esophagitis    Hyperlipidemia    Hypertension    Multiple sclerosis, secondary progressive    Osteoporosis    Pre-diabetes    Pulmonary embolus    Spasticity    Urgency incontinence    Vitamin D deficiency       Medications:  Current Outpatient Medications on File Prior to Visit   Medication Sig Dispense Refill    amLODIPine (NORVASC) 10 MG tablet Take 1 tablet by mouth Daily. 90 tablet 1    atorvastatin (LIPITOR) 40 MG tablet Take 1 tablet by mouth Every Night. 90 tablet 1    busPIRone (BUSPAR) 7.5 MG tablet Take 1 tablet by mouth 2 (Two) Times a Day. 60 tablet 1    carBAMazepine (TEGretol) 200 MG tablet Take 1 tablet by mouth 3 (Three) Times a Day. 270 tablet 3    carvedilol (COREG) 25 MG tablet Take 1 tablet by mouth 2 (Two) Times a Day With Meals. 180 tablet 1    cetirizine (zyrTEC) 10 MG tablet Take 1 tablet by mouth Daily.      EPINEPHrine (EpiPen 2-Mic) 0.3 MG/0.3ML solution auto-injector injection Inject 0.3 mL under the skin into the appropriate area as directed 1 (One) Time.      FLUoxetine (PROzac) 60 MG tablet Take 1 tablet by mouth Daily. 90 tablet 3    gabapentin (NEURONTIN) 300 MG capsule Take 1 capsule by mouth 3 (Three) Times a Day. 90 capsule 0    montelukast (SINGULAIR) 10 MG tablet Take 1 tablet by mouth Every Night. 90 tablet 1    nitroglycerin (Nitrostat) 0.4 MG SL tablet Place 1 tablet under the tongue Every 5 (Five) Minutes As Needed.      omeprazole (priLOSEC) 20 MG capsule Take 1 capsule by mouth Daily. 90 capsule 1    polyethylene glycol (MIRALAX) 17 GM/SCOOP powder Take 17 g by mouth Daily. 510 g 1    polyethylene glycol (MIRALAX) packet Take 17 g by mouth As Needed (constipation).      valsartan (DIOVAN)  "320 MG tablet Take 1 tablet by mouth Daily. 90 tablet 1    vitamin B-12 (CYANOCOBALAMIN) 1000 MCG tablet Take 1 tablet by mouth Daily. 30 tablet 2    vitamin D (ERGOCALCIFEROL) 1.25 MG (98637 UT) capsule capsule Take 1 capsule by mouth 1 (One) Time Per Week. 8 capsule 0     No current facility-administered medications on file prior to visit.       Allergies:   Allergies   Allergen Reactions    Shellfish-Derived Products Anaphylaxis    Sulfa Antibiotics Anaphylaxis and Unknown - Low Severity       Immunizations:  Immunization History   Administered Date(s) Administered    COVID-19 (PFIZER) Purple Cap Monovalent 01/20/2021    DTaP 11/04/2019    Fluzone (or Fluarix & Flulaval for VFC) >6mos 10/21/2013, 08/31/2015    Fluzone High Dose =>65 Years ([facility] ONLY) 09/15/2016, 09/07/2018, 10/28/2019    Fluzone High-Dose 65+yrs 09/15/2016, 09/07/2018, 10/28/2019, 11/23/2021, 12/07/2023    Influenza Quad Vaccine (Inpatient) 10/21/2013    Influenza Seasonal Injectable 11/07/2007, 11/03/2008, 09/30/2010, 09/01/2017, 10/01/2019    Influenza, Unspecified 10/31/2011    Pneumococcal Conjugate 13-Valent (PCV13) 08/27/2012, 08/31/2015    Pneumococcal Conjugate 20-Valent (PCV20) 12/07/2023    Pneumococcal Polysaccharide (PPSV23) 08/27/2012    Tdap 01/30/2012, 10/01/2019        Family History:  Family History   Problem Relation Age of Onset    COPD Mother     Hypertension Father     Alzheimer's disease Father     Dementia Father     Cancer Paternal Grandmother        Objective   /82   Pulse 63   Ht 160 cm (62.99\")   Wt 81.6 kg (180 lb)   SpO2 95%   BMI 31.89 kg/m²          Physical Exam  Vitals reviewed.   Constitutional:       General: She is not in acute distress.     Appearance: Normal appearance.   HENT:      Mouth/Throat:      Mouth: Mucous membranes are moist.   Eyes:      Conjunctiva/sclera: Conjunctivae normal.   Cardiovascular:      Rate and Rhythm: Normal rate and regular rhythm.      Heart sounds: Normal heart " sounds. No murmur heard.  Pulmonary:      Effort: Pulmonary effort is normal. No respiratory distress.      Breath sounds: Normal breath sounds.   Abdominal:      General: Abdomen is flat. Bowel sounds are normal.      Palpations: Abdomen is soft.   Neurological:      Mental Status: She is alert.      Motor: Weakness present.      Comments: Strength 4/5 on Left UE/LE though, LE less strength relatively to the UE, 5/5 on Right UE/LE, using rolator today   Psychiatric:      Comments: flat         Assessment & Plan       1. Secondary Progressive MS   - Following at [facility] with Neuro.   - Tecfidera stopped at her last appt. Did not tolerate Nudexta.   - Neuro previously had stopped Amitriptyline, Baclofen.   - FU with Neuro at [facility] in 1year     2. Prediabetes  - last checked 12/2023, will check in 1 year     3. Other hyperlipidemia  - Lipid Panel; 12/2023  - atorvastatin (LIPITOR) 40 MG tablet; Take 1 tablet by mouth Every Night.  Dispense: 90 tablet; Refill: 1     4. Vitamin D deficiency  - Vitamin D high dose currently, will check at next appointment     5. Peripheral polyneuropathy and B12 Deficiency  - neuropathic pain in feet likely due to MS  - Vitamin B12; check last visit, resumed B12  - gabapentin continue, not due for refill yet  - will continue longterm script  - signed contract on file  - sent UDS last visit  - [facility] queried without irregularities     6. Abnormal Thyroid Labs   - Thyroid slightly off, will check next visit    7. Generalized anxiety disorder, Depression  - uncontrolled  - Buspar increased to 15 AM and 7.5 PM  - continue Prozac 60mg  - discussed therapy with Cecilia Suarez, will continue to think about talk therapy  - previously on Xanax and Hydroxyzine and no longer taking     8. Primary hypertension  - controlled  - amLODIPine (NORVASC) 10 MG tablet; Take 1 tablet by mouth Daily.  Dispense: 90 tablet; Refill: 1  - carvedilol (COREG) 25 MG tablet; Take 1 tablet by mouth 2  (Two) Times a Day With Meals.  Dispense: 180 tablet; Refill: 1  - valsartan (DIOVAN) 320 MG tablet; Take 1 tablet by mouth Daily.  Dispense: 90 tablet; Refill: 1  - previously on Hydralazine and Spironolactone which are no longer required     9. Allergic rhinitis, unspecified seasonality, unspecified trigger  - continue Zyrtec  - montelukast (SINGULAIR) 10 MG tablet; Take 1 tablet by mouth Every Night.  Dispense: 90 tablet; Refill: 1     10. Constipation, unspecified constipation type  - polyethylene glycol (MIRALAX), continue     11. Gastroesophageal reflux disease without esophagitis  - uncontrolled, will resume medication  - omeprazole (priLOSEC) 20 MG capsule; continue      12. Osteoporosis   - Last DEXA October 2018. Ordered DEXA today  - continue high dose vit d, once vit d corrected. Will resume Fosamx  - will discuss Ca supplement next visit     13. Tubular Adenoma   - 10mm. Recommended fu in 3 years from 7/2019. Discuss next visit.      HCM  - mammo completed 5/2019. Ordered today  - cscope 7/2019, 10 mm tubular adenoma with 3 yr follow up  - will discuss Shingrix at next visit  - Discuss COVID vaccine at next viist     FU in 1mo     Irene Bailey MD, FAAP, FACP  Internal Medicine and Pediatrics  [facility]

## 2024-02-01 DIAGNOSIS — E55.9 VITAMIN D DEFICIENCY: ICD-10-CM

## 2024-02-01 RX ORDER — ERGOCALCIFEROL 1.25 MG/1
50000 CAPSULE ORAL WEEKLY
Qty: 8 CAPSULE | Refills: 0 | OUTPATIENT
Start: 2024-02-01

## 2024-02-01 NOTE — TELEPHONE ENCOUNTER
Rx Refill Note  Requested Prescriptions     Pending Prescriptions Disp Refills    vitamin D (ERGOCALCIFEROL) 1.25 MG (17214 UT) capsule capsule 8 capsule 0     Sig: Take 1 capsule by mouth 1 (One) Time Per Week.      Last office visit with prescribing clinician: 1/25/2024   Last telemedicine visit with prescribing clinician: Visit date not found   Next office visit with prescribing clinician: 2/22/2024     Jeri Neff Rep  02/01/24, 07:50 EST    Is this something you are wanting her to continue routinely or are you wanting to have another lab completed and revaluate before prescribing this again?

## 2024-02-14 DIAGNOSIS — G62.9 PERIPHERAL POLYNEUROPATHY: ICD-10-CM

## 2024-02-16 RX ORDER — GABAPENTIN 300 MG/1
300 CAPSULE ORAL 3 TIMES DAILY
Qty: 90 CAPSULE | Refills: 0 | Status: SHIPPED | OUTPATIENT
Start: 2024-02-16

## 2024-02-23 ENCOUNTER — HOSPITAL ENCOUNTER (OUTPATIENT)
Dept: BONE DENSITY | Facility: HOSPITAL | Age: 74
Discharge: HOME | End: 2024-02-23
Payer: MEDICARE

## 2024-02-23 DIAGNOSIS — M81.0 AGE-RELATED OSTEOPOROSIS WITHOUT CURRENT PATHOLOGICAL FRACTURE: ICD-10-CM

## 2024-02-23 PROCEDURE — 77080 DXA BONE DENSITY AXIAL: CPT

## 2024-02-29 ENCOUNTER — LAB (OUTPATIENT)
Age: 74
End: 2024-02-29
Payer: MEDICARE

## 2024-02-29 ENCOUNTER — OFFICE VISIT (OUTPATIENT)
Age: 74
End: 2024-02-29
Payer: MEDICARE

## 2024-02-29 VITALS
HEIGHT: 63 IN | SYSTOLIC BLOOD PRESSURE: 128 MMHG | OXYGEN SATURATION: 95 % | HEART RATE: 66 BPM | BODY MASS INDEX: 31.36 KG/M2 | DIASTOLIC BLOOD PRESSURE: 80 MMHG | WEIGHT: 177 LBS

## 2024-02-29 DIAGNOSIS — R94.6 ABNORMAL THYROID FUNCTION TEST: ICD-10-CM

## 2024-02-29 DIAGNOSIS — K63.5 POLYP OF COLON, UNSPECIFIED PART OF COLON, UNSPECIFIED TYPE: ICD-10-CM

## 2024-02-29 DIAGNOSIS — F33.1 MODERATE EPISODE OF RECURRENT MAJOR DEPRESSIVE DISORDER: Chronic | ICD-10-CM

## 2024-02-29 DIAGNOSIS — R94.6 NONSPECIFIC ABNORMAL RESULTS OF THYROID FUNCTION STUDY: ICD-10-CM

## 2024-02-29 DIAGNOSIS — E55.9 AVITAMINOSIS D: Primary | ICD-10-CM

## 2024-02-29 DIAGNOSIS — E55.9 VITAMIN D DEFICIENCY: ICD-10-CM

## 2024-02-29 DIAGNOSIS — R73.03 PRE-DIABETES: ICD-10-CM

## 2024-02-29 DIAGNOSIS — K21.9 GASTROESOPHAGEAL REFLUX DISEASE WITHOUT ESOPHAGITIS: ICD-10-CM

## 2024-02-29 DIAGNOSIS — M85.89 OSTEOPENIA OF MULTIPLE SITES: ICD-10-CM

## 2024-02-29 DIAGNOSIS — E78.2 MIXED HYPERLIPIDEMIA: ICD-10-CM

## 2024-02-29 DIAGNOSIS — M85.89 OSTEOPENIA OF MULTIPLE SITES: Primary | ICD-10-CM

## 2024-02-29 DIAGNOSIS — I10 PRIMARY HYPERTENSION: ICD-10-CM

## 2024-02-29 DIAGNOSIS — F41.1 GENERALIZED ANXIETY DISORDER: ICD-10-CM

## 2024-02-29 PROBLEM — M81.0 OSTEOPOROSIS: Status: RESOLVED | Noted: 2022-10-10 | Resolved: 2024-02-29

## 2024-02-29 LAB
25(OH)D3 SERPL-MCNC: 27.3 NG/ML (ref 30–100)
T4 FREE SERPL-MCNC: 0.76 NG/DL (ref 0.93–1.7)
TSH SERPL DL<=0.05 MIU/L-ACNC: 2.4 UIU/ML (ref 0.27–4.2)

## 2024-02-29 PROCEDURE — 3074F SYST BP LT 130 MM HG: CPT | Performed by: INTERNAL MEDICINE

## 2024-02-29 PROCEDURE — 84439 ASSAY OF FREE THYROXINE: CPT | Performed by: INTERNAL MEDICINE

## 2024-02-29 PROCEDURE — 36415 COLL VENOUS BLD VENIPUNCTURE: CPT | Performed by: INTERNAL MEDICINE

## 2024-02-29 PROCEDURE — 99214 OFFICE O/P EST MOD 30 MIN: CPT | Performed by: INTERNAL MEDICINE

## 2024-02-29 PROCEDURE — 84443 ASSAY THYROID STIM HORMONE: CPT | Performed by: INTERNAL MEDICINE

## 2024-02-29 PROCEDURE — 3079F DIAST BP 80-89 MM HG: CPT | Performed by: INTERNAL MEDICINE

## 2024-02-29 PROCEDURE — 82306 VITAMIN D 25 HYDROXY: CPT | Performed by: INTERNAL MEDICINE

## 2024-02-29 RX ORDER — BUSPIRONE HYDROCHLORIDE 15 MG/1
15 TABLET ORAL 2 TIMES DAILY
Qty: 60 TABLET | Refills: 2 | Status: SHIPPED | OUTPATIENT
Start: 2024-02-29

## 2024-02-29 NOTE — PROGRESS NOTES
"Progress Note    Subjective      Odalis \"JESSIKA\" is a 73 y.o. female.    Chief Complaint   Patient presents with    Anxiety       HPI  HTN   - currently on Amlodipine and Coreg  - no CP, edema, HA     Depression/Anxiety  - previously on Prozac 60, Xanax, and Abilify  - Neuro stopped her Abilify due to pseudobulbar affect concern   - no SI/HI  - feels like anxiety is not well controlled and is most problematic  - she has been worried about her sister that just had surgery on parathyroid  - she does really like her new caregiver  - she does feel sad but is enjoying tv and reading  - not seeing the ladies from [place]  - not really getting out and about  - would be willing to try increase of Buspar but still not willing to commit to therapy, after increasing to 15mg in AM and 7.5 PM, not noticed much change, would like to try going on up  - can't remember if Hydroxyzine made her tired in the past    MS   - due to pseudobulbar affective disorder Tecfidera stopped per patient  - she was not placed on any new medication for MS  - weakness worst on the left and in LE  - using rolator when leaves apartment and using cane in her apartment  - she does not have any sensation issues right now other than chronic neuropathy in feet  - no longer using orthotic  - she had quit seeing all physicians but she got back in with Neurology and plan is for her to go back in 1 year  - weakness, gait and sensation unchanged     Low Back Pain  - no longer on Hydrocodone and not really having significant back pain  - has been off for some time  - does have neuropathy in feet unclear source, no pain radiating down leg     HPL  - continues on statin     H/O of Seizure  - continues on Carbmazepine     Allergic Rhinitis  - continues on Singulair and Zyrtec     Constipation  - using Miralax     GERD  - using Omeprazole again since last visit and feel that this has helped     Osteopenia  - previously on Fosamax for osteoporosis, but not currently " taking  - since last visit DEXA shows only osteopenia    Past Medical History:  Patient Active Problem List   Diagnosis    Closed fracture of lower end of right radius with routine healing    Closed fracture of right distal radius    Moderate episode of recurrent major depressive disorder    PBA (pseudobulbar affect)    Seizure, temporal lobe    Gait abnormality    Chronic fatigue    Allergic rhinitis    Anxiety and depression    CAD (coronary artery disease)    Colon polyp    Gastroesophageal reflux disease without esophagitis    Hyperlipidemia    Hypertension    Multiple sclerosis, secondary progressive    Osteoporosis    Pre-diabetes    Pulmonary embolus    Spasticity    Urgency incontinence    Vitamin D deficiency       Medications:  Current Outpatient Medications on File Prior to Visit   Medication Sig Dispense Refill    amLODIPine (NORVASC) 10 MG tablet Take 1 tablet by mouth Daily. 90 tablet 1    atorvastatin (LIPITOR) 40 MG tablet Take 1 tablet by mouth Every Night. 90 tablet 1    busPIRone (BUSPAR) 7.5 MG tablet 2 tab po in AM and 1 tab po in PM 90 tablet 1    carBAMazepine (TEGretol) 200 MG tablet Take 1 tablet by mouth 3 (Three) Times a Day. 270 tablet 3    carvedilol (COREG) 25 MG tablet Take 1 tablet by mouth 2 (Two) Times a Day With Meals. 180 tablet 1    cetirizine (zyrTEC) 10 MG tablet Take 1 tablet by mouth Daily.      EPINEPHrine (EpiPen 2-Mic) 0.3 MG/0.3ML solution auto-injector injection Inject 0.3 mL under the skin into the appropriate area as directed 1 (One) Time.      FLUoxetine (PROzac) 60 MG tablet Take 1 tablet by mouth Daily. 90 tablet 3    gabapentin (NEURONTIN) 300 MG capsule Take 1 capsule by mouth 3 (Three) Times a Day. 90 capsule 0    montelukast (SINGULAIR) 10 MG tablet Take 1 tablet by mouth Every Night. 90 tablet 1    nitroglycerin (Nitrostat) 0.4 MG SL tablet Place 1 tablet under the tongue Every 5 (Five) Minutes As Needed.      omeprazole (priLOSEC) 20 MG capsule Take 1 capsule  "by mouth Daily. 90 capsule 1    polyethylene glycol (MIRALAX) 17 GM/SCOOP powder Take 17 g by mouth Daily. 510 g 1    polyethylene glycol (MIRALAX) packet Take 17 g by mouth As Needed (constipation).      valsartan (DIOVAN) 320 MG tablet Take 1 tablet by mouth Daily. 90 tablet 1    vitamin B-12 (CYANOCOBALAMIN) 1000 MCG tablet Take 1 tablet by mouth Daily. 30 tablet 2    vitamin D (ERGOCALCIFEROL) 1.25 MG (98717 UT) capsule capsule Take 1 capsule by mouth 1 (One) Time Per Week. 8 capsule 0     No current facility-administered medications on file prior to visit.       Allergies:   Allergies   Allergen Reactions    Shellfish-Derived Products Anaphylaxis    Sulfa Antibiotics Anaphylaxis and Unknown - Low Severity       Immunizations:  Immunization History   Administered Date(s) Administered    COVID-19 (PFIZER) Purple Cap Monovalent 01/20/2021    DTaP 11/04/2019    Fluzone (or Fluarix & Flulaval for VFC) >6mos 10/21/2013, 08/31/2015    Fluzone High Dose =>65 Years ([facility] ONLY) 09/15/2016, 09/07/2018, 10/28/2019    Fluzone High-Dose 65+yrs 09/15/2016, 09/07/2018, 10/28/2019, 11/23/2021, 12/07/2023    Influenza Quad Vaccine (Inpatient) 10/21/2013    Influenza Seasonal Injectable 11/07/2007, 11/03/2008, 09/30/2010, 09/01/2017, 10/01/2019    Influenza, Unspecified 10/31/2011    Pneumococcal Conjugate 13-Valent (PCV13) 08/27/2012, 08/31/2015    Pneumococcal Conjugate 20-Valent (PCV20) 12/07/2023    Pneumococcal Polysaccharide (PPSV23) 08/27/2012    Tdap 01/30/2012, 10/01/2019        Family History:  Family History   Problem Relation Age of Onset    COPD Mother     Hypertension Father     Alzheimer's disease Father     Dementia Father     Cancer Paternal Grandmother        Objective   /80   Pulse 66   Ht 160 cm (62.99\")   Wt 80.3 kg (177 lb)   SpO2 95%   BMI 31.36 kg/m²     BMI is >= 30 and <35. (Class 1 Obesity). The following options were offered after discussion;: nutrition counseling/recommendations   "     Physical Exam  Vitals reviewed.   HENT:      Mouth/Throat:      Mouth: Mucous membranes are moist.   Eyes:      Conjunctiva/sclera: Conjunctivae normal.   Cardiovascular:      Rate and Rhythm: Normal rate and regular rhythm.      Pulses: Normal pulses.      Heart sounds: Normal heart sounds. No murmur heard.  Pulmonary:      Effort: Pulmonary effort is normal. No respiratory distress.      Breath sounds: Normal breath sounds.   Abdominal:      General: Abdomen is flat. Bowel sounds are normal. There is no distension.      Palpations: Abdomen is soft.      Tenderness: There is no abdominal tenderness.   Skin:     General: Skin is warm and dry.   Neurological:      Mental Status: She is alert.   Psychiatric:      Comments: More smiling and eye contact today but still a little flat         Assessment & Plan     1. Secondary Progressive MS   - Following at [facility] with Neuro.   - Tecfidera stopped at her last appt. Did not tolerate Nudexta.   - Neuro previously had stopped Amitriptyline, Baclofen.   - FU with Neuro at [facility] in 1year     2. Prediabetes  - last checked 12/2023, will check in 1 year     3. Other hyperlipidemia  - Lipid Panel; 12/2023  - atorvastatin (LIPITOR) 40 MG tablet; Take 1 tablet by mouth Every Night.  Dispense: 90 tablet; Refill: 1     4. Vitamin D deficiency  - finished Vitamin D high dose currently, will checkvit d level today     5. Peripheral polyneuropathy and B12 Deficiency  - neuropathic pain in feet likely due to MS  - Vitamin B12; check last visit, resumed B12  - gabapentin continue, not due for refill yet  - will continue longterm script  - signed contract on file  - UDS 12/2023 last visit consistent with known Gabapentin   - Chalino queried without irregularities     6. Abnormal Thyroid Labs   - last check abn, will repeat TSH, FT4 today     7. Generalized anxiety disorder, Depression  - uncontrolled  - Buspar increased to 15 AM and 15 PM  - continue Prozac 60mg  -  discussed therapy with Cecilia Suarez, will continue to think about talk therapy  - previously on Xanax and Hydroxyzine and no longer taking     8. Primary hypertension  - controlled  - amLODIPine (NORVASC) 10 MG tablet; Take 1 tablet by mouth Daily.  Dispense: 90 tablet; Refill: 1  - carvedilol (COREG) 25 MG tablet; Take 1 tablet by mouth 2 (Two) Times a Day With Meals.  Dispense: 180 tablet; Refill: 1  - valsartan (DIOVAN) 320 MG tablet; Take 1 tablet by mouth Daily.  Dispense: 90 tablet; Refill: 1  - previously on Hydralazine and Spironolactone which are no longer required     9. Allergic rhinitis, unspecified seasonality, unspecified trigger  - continue Zyrtec  - montelukast (SINGULAIR) 10 MG tablet; Take 1 tablet by mouth Every Night.  Dispense: 90 tablet; Refill: 1     10. Constipation, unspecified constipation type  - polyethylene glycol (MIRALAX), continue     11. Gastroesophageal reflux disease without esophagitis  - uncontrolled, will resume medication  - omeprazole (priLOSEC) 20 MG capsule; continue      12. Osteopenia  - Last DEXA Feb 2024 and indicated osteopenia, which is improved from last DEXA of osteoporosis  - continue high dose vit d, once vit d corrected.   - will not resume Fosamax as pt off for about 2yrs and scan improved     13. Tubular Adenoma   - 10mm. Recommended fu in 3 years from 7/2019. Signed KP to get copy from [facility]  - mammo completed 5/2019. Scheduled for 5/2024  - cscope 7/2019, 10 mm tubular adenoma with 3 yr follow up  - encouraged to get Shingrix  - Discuss COVID vaccine at next viist     FU in 1mo for AMW and mood       Irene Bailey MD, FAAP, FACP  Internal Medicine and Pediatrics  [facility]

## 2024-03-01 DIAGNOSIS — E55.9 VITAMIN D DEFICIENCY: ICD-10-CM

## 2024-03-01 DIAGNOSIS — E03.9 HYPOTHYROIDISM, UNSPECIFIED TYPE: Primary | ICD-10-CM

## 2024-03-01 RX ORDER — ERGOCALCIFEROL 1.25 MG/1
50000 CAPSULE ORAL WEEKLY
Qty: 8 CAPSULE | Refills: 0 | Status: SHIPPED | OUTPATIENT
Start: 2024-03-01

## 2024-03-01 RX ORDER — LEVOTHYROXINE SODIUM 0.03 MG/1
25 TABLET ORAL
Qty: 30 TABLET | Refills: 1 | Status: SHIPPED | OUTPATIENT
Start: 2024-03-01

## 2024-03-04 DIAGNOSIS — I10 PRIMARY HYPERTENSION: ICD-10-CM

## 2024-03-04 RX ORDER — CARVEDILOL 25 MG/1
25 TABLET ORAL 2 TIMES DAILY WITH MEALS
Qty: 180 TABLET | Refills: 1 | Status: SHIPPED | OUTPATIENT
Start: 2024-03-04

## 2024-03-04 RX ORDER — AMLODIPINE BESYLATE 10 MG/1
10 TABLET ORAL DAILY
Qty: 90 TABLET | Refills: 1 | Status: SHIPPED | OUTPATIENT
Start: 2024-03-04

## 2024-03-04 NOTE — TELEPHONE ENCOUNTER
Rx Refill Note  Requested Prescriptions     Pending Prescriptions Disp Refills    amLODIPine (NORVASC) 10 MG tablet 90 tablet 1     Sig: Take 1 tablet by mouth Daily.    carvedilol (COREG) 25 MG tablet 180 tablet 1     Sig: Take 1 tablet by mouth 2 (Two) Times a Day With Meals.      Last office visit with prescribing clinician: 2/29/2024   Last telemedicine visit with prescribing clinician: Visit date not found   Next office visit with prescribing clinician: 4/4/2024     Jeri Neff Rep  03/04/24, 08:33 EST    Rx sent

## 2024-03-05 ENCOUNTER — TELEPHONE (OUTPATIENT)
Age: 74
End: 2024-03-05
Payer: MEDICARE

## 2024-03-05 NOTE — TELEPHONE ENCOUNTER
Spoke with pt to inquire if she is having a med reaction she states she is not she was wanting clarification on which meds were called in on 3/1 she received levothyroxine but still needs to  vitamin D from pharmacy no further questions

## 2024-03-05 NOTE — TELEPHONE ENCOUNTER
"  Caller: Odalis Mir \"JESSIKA\"    Relationship: Self    Best call back number: [phone]    What is the best time to reach you: ANYTIME    Who are you requesting to speak with (clinical staff, provider,  specific staff member): PROVIDER    What was the call regarding: PATIENT IS HAVING SOME ISSUES WITH THESE LAST MEDICATIONS YOU CALLED IN FOR HER    "

## 2024-04-26 ENCOUNTER — LAB (OUTPATIENT)
Age: 74
End: 2024-04-26
Payer: MEDICARE

## 2024-04-26 ENCOUNTER — OFFICE VISIT (OUTPATIENT)
Age: 74
End: 2024-04-26
Payer: MEDICARE

## 2024-04-26 VITALS
OXYGEN SATURATION: 96 % | BODY MASS INDEX: 31.54 KG/M2 | HEART RATE: 62 BPM | SYSTOLIC BLOOD PRESSURE: 124 MMHG | HEIGHT: 63 IN | DIASTOLIC BLOOD PRESSURE: 82 MMHG | WEIGHT: 178 LBS

## 2024-04-26 DIAGNOSIS — R53.82 CHRONIC FATIGUE: ICD-10-CM

## 2024-04-26 DIAGNOSIS — E55.9 VITAMIN D DEFICIENCY: ICD-10-CM

## 2024-04-26 DIAGNOSIS — E55.9 AVITAMINOSIS D: Primary | ICD-10-CM

## 2024-04-26 DIAGNOSIS — G62.9 PERIPHERAL POLYNEUROPATHY: ICD-10-CM

## 2024-04-26 DIAGNOSIS — Z00.00 MEDICARE ANNUAL WELLNESS VISIT, SUBSEQUENT: Primary | ICD-10-CM

## 2024-04-26 PROCEDURE — 82306 VITAMIN D 25 HYDROXY: CPT | Performed by: INTERNAL MEDICINE

## 2024-04-26 PROCEDURE — 3079F DIAST BP 80-89 MM HG: CPT | Performed by: INTERNAL MEDICINE

## 2024-04-26 PROCEDURE — 3074F SYST BP LT 130 MM HG: CPT | Performed by: INTERNAL MEDICINE

## 2024-04-26 PROCEDURE — 1159F MED LIST DOCD IN RCRD: CPT | Performed by: INTERNAL MEDICINE

## 2024-04-26 PROCEDURE — 84443 ASSAY THYROID STIM HORMONE: CPT | Performed by: INTERNAL MEDICINE

## 2024-04-26 PROCEDURE — 84439 ASSAY OF FREE THYROXINE: CPT | Performed by: INTERNAL MEDICINE

## 2024-04-26 PROCEDURE — G0439 PPPS, SUBSEQ VISIT: HCPCS | Performed by: INTERNAL MEDICINE

## 2024-04-26 PROCEDURE — 1160F RVW MEDS BY RX/DR IN RCRD: CPT | Performed by: INTERNAL MEDICINE

## 2024-04-26 PROCEDURE — 36415 COLL VENOUS BLD VENIPUNCTURE: CPT | Performed by: INTERNAL MEDICINE

## 2024-04-26 PROCEDURE — 1170F FXNL STATUS ASSESSED: CPT | Performed by: INTERNAL MEDICINE

## 2024-04-26 RX ORDER — GABAPENTIN 300 MG/1
300 CAPSULE ORAL 2 TIMES DAILY
Qty: 60 CAPSULE | Refills: 0 | Status: SHIPPED | OUTPATIENT
Start: 2024-04-26

## 2024-04-26 NOTE — PROGRESS NOTES
The ABCs of the Annual Wellness Visit  Subsequent Medicare Wellness Visit    Subjective      Odalis Mir is a 73 y.o. female who presents for a Subsequent Medicare Wellness Visit.    The following portions of the patient's history were reviewed and   updated as appropriate: allergies, current medications, past family history, past medical history, past social history, past surgical history, and problem list.    Compared to one year ago, the patient feels her physical   health is the same.    Compared to one year ago, the patient feels her mental   health is the same.    Recent Hospitalizations:  She was not admitted to the hospital during the last year.       Current Medical Providers:  Patient Care Team:  Irene Bailey MD as PCP - General (Internal Medicine)    Outpatient Medications Prior to Visit   Medication Sig Dispense Refill    amLODIPine (NORVASC) 10 MG tablet Take 1 tablet by mouth Daily. 90 tablet 1    atorvastatin (LIPITOR) 40 MG tablet Take 1 tablet by mouth Every Night. 90 tablet 1    busPIRone (BUSPAR) 15 MG tablet Take 1 tablet by mouth 2 (Two) Times a Day. 2 tab po in AM and 1 tab po in PM 60 tablet 2    carBAMazepine (TEGretol) 200 MG tablet Take 1 tablet by mouth 3 (Three) Times a Day. 270 tablet 3    carvedilol (COREG) 25 MG tablet Take 1 tablet by mouth 2 (Two) Times a Day With Meals. 180 tablet 1    cetirizine (zyrTEC) 10 MG tablet Take 1 tablet by mouth Daily.      EPINEPHrine (EpiPen 2-Mic) 0.3 MG/0.3ML solution auto-injector injection Inject 0.3 mL under the skin into the appropriate area as directed 1 (One) Time.      FLUoxetine (PROzac) 60 MG tablet Take 1 tablet by mouth Daily. 90 tablet 3    gabapentin (NEURONTIN) 300 MG capsule Take 1 capsule by mouth 3 (Three) Times a Day. 90 capsule 0    levothyroxine (SYNTHROID, LEVOTHROID) 25 MCG tablet Take 1 tablet by mouth Every Morning. 30 tablet 1    montelukast (SINGULAIR) 10 MG tablet Take 1 tablet by mouth Every Night. 90 tablet 1     nitroglycerin (Nitrostat) 0.4 MG SL tablet Place 1 tablet under the tongue Every 5 (Five) Minutes As Needed.      omeprazole (priLOSEC) 20 MG capsule Take 1 capsule by mouth Daily. 90 capsule 1    polyethylene glycol (MIRALAX) 17 GM/SCOOP powder Take 17 g by mouth Daily. 510 g 1    polyethylene glycol (MIRALAX) packet Take 17 g by mouth As Needed (constipation).      valsartan (DIOVAN) 320 MG tablet Take 1 tablet by mouth Daily. 90 tablet 1    vitamin B-12 (CYANOCOBALAMIN) 1000 MCG tablet Take 1 tablet by mouth Daily. 30 tablet 2    vitamin D (ERGOCALCIFEROL) 1.25 MG (77497 UT) capsule capsule Take 1 capsule by mouth 1 (One) Time Per Week. 8 capsule 0     No facility-administered medications prior to visit.       No opioid medication identified on active medication list. I have reviewed chart for other potential  high risk medication/s and harmful drug interactions in the elderly.        Aspirin is not on active medication list.  Aspirin use is not indicated based on review of current medical condition/s. Risk of harm outweighs potential benefits.  .    Patient Active Problem List   Diagnosis    Closed fracture of lower end of right radius with routine healing    Closed fracture of right distal radius    Moderate episode of recurrent major depressive disorder    PBA (pseudobulbar affect)    Seizure, temporal lobe    Gait abnormality    Chronic fatigue    Allergic rhinitis    Anxiety and depression    Colon polyp    Gastroesophageal reflux disease without esophagitis    Hyperlipidemia    Hypertension    Multiple sclerosis, secondary progressive    Pre-diabetes    Pulmonary embolus    Spasticity    Urgency incontinence    Vitamin D deficiency    Osteopenia of multiple sites     Advance Care Planning   Advance Care Planning     Advance Directive is on file.  ACP discussion was held with the patient during this visit. Patient has an advance directive in EMR which is still valid.      Objective    Vitals:    04/26/24  "1010   BP: 124/82   Pulse: 62   SpO2: 96%   Weight: 80.7 kg (178 lb)   Height: 160 cm (62.99\")     Estimated body mass index is 31.54 kg/m² as calculated from the following:    Height as of this encounter: 160 cm (62.99\").    Weight as of this encounter: 80.7 kg (178 lb).    Physical Exam:  GEN: NAD  HEENT: MMM  CHEST: CTAB, no increased wob  CV: S1S2, RRR, no m/r/g darin  ABD: soft, NT, ND, BS present  Neuro: 4/5 strength on left UE and left LE    Does the patient have evidence of cognitive impairment?   No Mini Cog 5            HEALTH RISK ASSESSMENT    Smoking Status:  Social History     Tobacco Use   Smoking Status Never    Passive exposure: Never   Smokeless Tobacco Never     Alcohol Consumption:  Social History     Substance and Sexual Activity   Alcohol Use No     Fall Risk Screen:    ROSAADI Fall Risk Assessment was completed, and patient is at MODERATE risk for falls. Assessment completed on:2024    Depression Screenin/26/2024    10:16 AM   PHQ-2/PHQ-9 Depression Screening   Little Interest or Pleasure in Doing Things 0-->not at all   Feeling Down, Depressed or Hopeless 0-->not at all   PHQ-9: Brief Depression Severity Measure Score 0       Health Habits and Functional and Cognitive Screenin/26/2024    10:13 AM   Functional & Cognitive Status   Do you have difficulty preparing food and eating? No   Do you have difficulty bathing yourself, getting dressed or grooming yourself? No   Do you have difficulty using the toilet? No   Do you have difficulty moving around from place to place? Yes   Do you have trouble with steps or getting out of a bed or a chair? Yes   Current Diet Frequent Junk Food   Dental Exam Not up to date   Eye Exam Not up to date   Exercise (times per week) 3 times per week   Current Exercises Include Walking   Do you need help using the phone?  No   Are you deaf or do you have serious difficulty hearing?  No   Do you need help to go to places out of walking distance? Yes "   Do you need help shopping? No   Do you need help preparing meals?  Yes   Do you need help with housework?  Yes   Do you need help with laundry? Yes   Do you need help taking your medications? No   Do you need help managing money? No   Do you ever drive or ride in a car without wearing a seat belt? No   Have you felt unusual stress, anger or loneliness in the last month? No   Who do you live with? Community   If you need help, do you have trouble finding someone available to you? No   Have you been bothered in the last four weeks by sexual problems? No   Do you have difficulty concentrating, remembering or making decisions? Yes       Age-appropriate Screening Schedule:  Refer to the list below for future screening recommendations based on patient's age, sex and/or medical conditions. Orders for these recommended tests are listed in the plan section. The patient has been provided with a written plan.    Health Maintenance   Topic Date Due    ZOSTER VACCINE (1 of 2) Never done    RSV Vaccine - Adults (1 - 1-dose 60+ series) Never done    ANNUAL WELLNESS VISIT  Never done    COLORECTAL CANCER SCREENING  02/11/2019    MAMMOGRAM  05/14/2021    COVID-19 Vaccine (2 - 2023-24 season) 09/01/2023    INFLUENZA VACCINE  08/01/2024    LIPID PANEL  12/07/2024    BMI FOLLOWUP  02/28/2025    DXA SCAN  02/23/2026    TDAP/TD VACCINES (3 - Td or Tdap) 10/01/2029    HEPATITIS C SCREENING  Completed    Pneumococcal Vaccine 65+  Completed                  CMS Preventative Services Quick Reference  Risk Factors Identified During Encounter:    Depression/Dysphoria: Current medication is effective, no change recommended  Fall Risk-High or Moderate: Discussed Fall Prevention in the home  Immunizations Discussed/Encouraged: Shingrix, COVID19, and RSV (Respiratory Syncytial Virus)  Inadequate Social Support, Isolation, Loneliness, Lack of Transportation, Financial Difficulties, or Caregiver Stress: Caregiver is great. This has helped.      Cancer Screening:   - Breast Cancer: mammo completed 5/2019. Scheduled for 5/2024  - Colon Cancer: cscope 7/2019, 10 mm tubular adenoma with 3 yr follow up - Discussed colonoscopy and the fact that a 10mm tubular adenoma was with elevated risk for conversion to malignancy. Patient said that she understood the risk of malignancy and did not desire to go forward with colonoscopy despite the risk.     The above risks/problems have been discussed with the patient.  Pertinent information has been shared with the patient in the After Visit Summary.    MS and Peripheral Neuropathy - Gabapentin refilled today. Chalino reviewed and CSA and UDS up to date.     Vit D Def - completed Vit D. Ordered Vit D today    Abnormal Thyroid Labs - Continue Levothyroxine and will recheck thyroid labs today    DEXA completed and showed osteopenia in Feb 2024    Follow Up:   Next Medicare Wellness visit to be scheduled in 1 year.      An After Visit Summary and PPPS were made available to the patient.

## 2024-04-27 LAB
25(OH)D3 SERPL-MCNC: 52.1 NG/ML (ref 30–100)
T4 FREE SERPL-MCNC: 0.86 NG/DL (ref 0.93–1.7)
TSH SERPL DL<=0.05 MIU/L-ACNC: 3.18 UIU/ML (ref 0.27–4.2)

## 2024-04-29 DIAGNOSIS — E03.9 HYPOTHYROIDISM, UNSPECIFIED TYPE: ICD-10-CM

## 2024-04-29 RX ORDER — LEVOTHYROXINE SODIUM 0.05 MG/1
50 TABLET ORAL
Qty: 30 TABLET | Refills: 3 | Status: SHIPPED | OUTPATIENT
Start: 2024-04-29

## 2024-05-16 ENCOUNTER — HOSPITAL ENCOUNTER (OUTPATIENT)
Dept: MAMMOGRAPHY | Facility: HOSPITAL | Age: 74
Discharge: HOME | End: 2024-05-16
Payer: MEDICARE

## 2024-05-16 ENCOUNTER — APPOINTMENT (OUTPATIENT)
Dept: OTHER | Facility: HOSPITAL | Age: 74
End: 2024-05-16
Payer: MEDICARE

## 2024-05-16 DIAGNOSIS — Z12.31 ENCOUNTER FOR SCREENING MAMMOGRAM FOR MALIGNANT NEOPLASM OF BREAST: ICD-10-CM

## 2024-05-16 PROCEDURE — 77063 BREAST TOMOSYNTHESIS BI: CPT

## 2024-05-16 PROCEDURE — 77067 SCR MAMMO BI INCL CAD: CPT

## 2024-05-28 DIAGNOSIS — K21.9 GASTROESOPHAGEAL REFLUX DISEASE WITHOUT ESOPHAGITIS: ICD-10-CM

## 2024-05-28 DIAGNOSIS — G62.9 PERIPHERAL POLYNEUROPATHY: ICD-10-CM

## 2024-05-28 RX ORDER — OMEPRAZOLE 20 MG/1
20 CAPSULE, DELAYED RELEASE ORAL DAILY
Qty: 90 CAPSULE | Refills: 1 | Status: SHIPPED | OUTPATIENT
Start: 2024-05-28

## 2024-05-28 RX ORDER — GABAPENTIN 300 MG/1
300 CAPSULE ORAL 2 TIMES DAILY
Qty: 60 CAPSULE | Refills: 0 | Status: SHIPPED | OUTPATIENT
Start: 2024-05-28

## 2024-05-28 NOTE — TELEPHONE ENCOUNTER
Rx Refill Note  Requested Prescriptions     Pending Prescriptions Disp Refills    gabapentin (NEURONTIN) 300 MG capsule 60 capsule 0     Sig: Take 1 capsule by mouth 2 (Two) Times a Day.      Last office visit with prescribing clinician: 4/26/2024   Last telemedicine visit with prescribing clinician: Visit date not found   Next office visit with prescribing clinician: 7/26/2024     Michelle Clement MA  05/28/24, 14:22 EDT

## 2024-06-13 DIAGNOSIS — F41.1 GENERALIZED ANXIETY DISORDER: ICD-10-CM

## 2024-06-13 RX ORDER — BUSPIRONE HYDROCHLORIDE 15 MG/1
15 TABLET ORAL 2 TIMES DAILY
Qty: 60 TABLET | Refills: 2 | Status: SHIPPED | OUTPATIENT
Start: 2024-06-13

## 2024-06-25 DIAGNOSIS — E78.49 OTHER HYPERLIPIDEMIA: ICD-10-CM

## 2024-06-25 DIAGNOSIS — I10 PRIMARY HYPERTENSION: ICD-10-CM

## 2024-06-25 RX ORDER — ATORVASTATIN CALCIUM 40 MG/1
40 TABLET, FILM COATED ORAL NIGHTLY
Qty: 90 TABLET | Refills: 1 | Status: SHIPPED | OUTPATIENT
Start: 2024-06-25

## 2024-06-25 RX ORDER — VALSARTAN 320 MG/1
320 TABLET ORAL DAILY
Qty: 90 TABLET | Refills: 1 | Status: SHIPPED | OUTPATIENT
Start: 2024-06-25

## 2024-06-25 NOTE — TELEPHONE ENCOUNTER
Dr. Torres Rx Refill Note  Requested Prescriptions     Pending Prescriptions Disp Refills    atorvastatin (LIPITOR) 40 MG tablet 90 tablet 1     Sig: Take 1 tablet by mouth Every Night.    valsartan (DIOVAN) 320 MG tablet 90 tablet 1     Sig: Take 1 tablet by mouth Daily.      Last office visit with prescribing clinician: 4/26/2024   Last telemedicine visit with prescribing clinician: Visit date not found   Next office visit with prescribing clinician: 7/26/2024                         Would you like a call back once the refill request has been completed: [] Yes [] No    If the office needs to give you a call back, can they leave a voicemail: [] Yes [] No    Komal Conway MA  06/25/24, 08:01 EDT   Medicine Attending

## 2024-07-26 ENCOUNTER — LAB (OUTPATIENT)
Age: 74
End: 2024-07-26
Payer: MEDICARE

## 2024-07-26 ENCOUNTER — OFFICE VISIT (OUTPATIENT)
Age: 74
End: 2024-07-26
Payer: MEDICARE

## 2024-07-26 VITALS
BODY MASS INDEX: 31.54 KG/M2 | DIASTOLIC BLOOD PRESSURE: 74 MMHG | HEIGHT: 63 IN | OXYGEN SATURATION: 97 % | HEART RATE: 65 BPM | WEIGHT: 178 LBS | SYSTOLIC BLOOD PRESSURE: 124 MMHG

## 2024-07-26 DIAGNOSIS — G40.109 SEIZURE, TEMPORAL LOBE: Chronic | ICD-10-CM

## 2024-07-26 DIAGNOSIS — E03.9 HYPOTHYROIDISM, UNSPECIFIED TYPE: Primary | ICD-10-CM

## 2024-07-26 DIAGNOSIS — G35 MULTIPLE SCLEROSIS, SECONDARY PROGRESSIVE: ICD-10-CM

## 2024-07-26 DIAGNOSIS — E03.9 ACQUIRED HYPOTHYROIDISM: Primary | ICD-10-CM

## 2024-07-26 DIAGNOSIS — E03.9 HYPOTHYROIDISM, UNSPECIFIED TYPE: ICD-10-CM

## 2024-07-26 DIAGNOSIS — F41.9 ANXIETY AND DEPRESSION: ICD-10-CM

## 2024-07-26 DIAGNOSIS — E78.2 MIXED HYPERLIPIDEMIA: ICD-10-CM

## 2024-07-26 DIAGNOSIS — M85.89 OSTEOPENIA OF MULTIPLE SITES: ICD-10-CM

## 2024-07-26 DIAGNOSIS — I10 PRIMARY HYPERTENSION: ICD-10-CM

## 2024-07-26 DIAGNOSIS — F32.A ANXIETY AND DEPRESSION: ICD-10-CM

## 2024-07-26 PROCEDURE — 3078F DIAST BP <80 MM HG: CPT | Performed by: INTERNAL MEDICINE

## 2024-07-26 PROCEDURE — 36415 COLL VENOUS BLD VENIPUNCTURE: CPT | Performed by: INTERNAL MEDICINE

## 2024-07-26 PROCEDURE — 84443 ASSAY THYROID STIM HORMONE: CPT | Performed by: INTERNAL MEDICINE

## 2024-07-26 PROCEDURE — 1125F AMNT PAIN NOTED PAIN PRSNT: CPT | Performed by: INTERNAL MEDICINE

## 2024-07-26 PROCEDURE — 84439 ASSAY OF FREE THYROXINE: CPT | Performed by: INTERNAL MEDICINE

## 2024-07-26 PROCEDURE — 1160F RVW MEDS BY RX/DR IN RCRD: CPT | Performed by: INTERNAL MEDICINE

## 2024-07-26 PROCEDURE — 1159F MED LIST DOCD IN RCRD: CPT | Performed by: INTERNAL MEDICINE

## 2024-07-26 PROCEDURE — 3074F SYST BP LT 130 MM HG: CPT | Performed by: INTERNAL MEDICINE

## 2024-07-26 PROCEDURE — 99214 OFFICE O/P EST MOD 30 MIN: CPT | Performed by: INTERNAL MEDICINE

## 2024-07-26 RX ORDER — HYDROXYZINE HYDROCHLORIDE 25 MG/1
TABLET, FILM COATED ORAL
Qty: 60 TABLET | Refills: 1 | Status: SHIPPED | OUTPATIENT
Start: 2024-07-26

## 2024-07-26 NOTE — PROGRESS NOTES
"Progress Note    Subjective      Odalis \"JESSIKA\" is a 74 y.o. female.    Chief Complaint   Patient presents with    Dizziness     Just today starting to feel dizzy all day        HPI  HTN   - currently on Amlodipine and Coreg  - no CP, edema, HA     Depression/Anxiety  - previously on Prozac 60, Xanax, and Abilify  - Neuro stopped her Abilify due to pseudobulbar affect concern   - no SI/HI  - feels like anxiety is not well controlled and is most problematic  - her son came to visit for her Bday and he got frustrated with her and left, since apologized but it bothered her  - enjoying tv and reading  - not seeing the ladies from [employer]  - not really getting out and about  - Buspar increase did not help    MS   - due to pseudobulbar affective disorder Tecfidera stopped per patient  - she was not placed on any new medication for MS  - weakness worst on the left and in LE  - using rolator when leaves apartment and using cane in her apartment  - she does not have any sensation issues right now other than chronic neuropathy in feet  - no longer using orthotic  - she had quit seeing all physicians but she got back in with Neurology and plan is for her to go back in 1 year  - weakness, gait and sensation unchanged     Low Back Pain  - no longer on Hydrocodone and not really having significant back pain  - has been off for some time  - does have neuropathy in feet unclear source, no pain radiating down leg     HPL  - continues on statin     Seizure  - continues on Carbmazepine  - had a seizure per patient that she can't remember details of event but found by caregiver with body wide shaking  - had another episode 1 week ago with head shaking and bilateral hand shaking lasted around 10min  - has not contacted neuro     Allergic Rhinitis  - continues on Singulair and Zyrtec     Constipation  - using Miralax     GERD  - using Omeprazole again since last visit and feel that this has helped     Osteopenia  - previously on Fosamax " for osteoporosis, but not currently taking  - since last visit DEXA shows only osteopenia  Past Medical History:  Patient Active Problem List   Diagnosis    Closed fracture of lower end of right radius with routine healing    Closed fracture of right distal radius    Moderate episode of recurrent major depressive disorder    PBA (pseudobulbar affect)    Seizure, temporal lobe    Gait abnormality    Chronic fatigue    Allergic rhinitis    Anxiety and depression    Colon polyp    Gastroesophageal reflux disease without esophagitis    Hyperlipidemia    Hypertension    Multiple sclerosis, secondary progressive    Pre-diabetes    Pulmonary embolus    Spasticity    Urgency incontinence    Vitamin D deficiency    Osteopenia of multiple sites       Medications:  Current Outpatient Medications on File Prior to Visit   Medication Sig Dispense Refill    amLODIPine (NORVASC) 10 MG tablet Take 1 tablet by mouth Daily. 90 tablet 1    atorvastatin (LIPITOR) 40 MG tablet Take 1 tablet by mouth Every Night. 90 tablet 1    busPIRone (BUSPAR) 15 MG tablet Take 1 tablet by mouth 2 (Two) Times a Day. 2 tab po in AM and 1 tab po in PM 60 tablet 2    carBAMazepine (TEGretol) 200 MG tablet Take 1 tablet by mouth 3 (Three) Times a Day. 270 tablet 3    carvedilol (COREG) 25 MG tablet Take 1 tablet by mouth 2 (Two) Times a Day With Meals. 180 tablet 1    cetirizine (zyrTEC) 10 MG tablet Take 1 tablet by mouth Daily.      EPINEPHrine (EpiPen 2-Mic) 0.3 MG/0.3ML solution auto-injector injection Inject 0.3 mL under the skin into the appropriate area as directed 1 (One) Time.      FLUoxetine (PROzac) 60 MG tablet Take 1 tablet by mouth Daily. 90 tablet 3    gabapentin (NEURONTIN) 300 MG capsule Take 1 capsule by mouth 2 (Two) Times a Day. 60 capsule 0    levothyroxine (SYNTHROID, LEVOTHROID) 50 MCG tablet Take 1 tablet by mouth Every Morning. 30 tablet 3    montelukast (SINGULAIR) 10 MG tablet Take 1 tablet by mouth Every Night. 90 tablet 1     nitroglycerin (Nitrostat) 0.4 MG SL tablet Place 1 tablet under the tongue Every 5 (Five) Minutes As Needed.      omeprazole (priLOSEC) 20 MG capsule Take 1 capsule by mouth Daily. 90 capsule 1    polyethylene glycol (MIRALAX) 17 GM/SCOOP powder Take 17 g by mouth Daily. 510 g 1    polyethylene glycol (MIRALAX) packet Take 17 g by mouth As Needed (constipation).      valsartan (DIOVAN) 320 MG tablet Take 1 tablet by mouth Daily. 90 tablet 1    vitamin B-12 (CYANOCOBALAMIN) 1000 MCG tablet Take 1 tablet by mouth Daily. 30 tablet 2    vitamin D (ERGOCALCIFEROL) 1.25 MG (97729 UT) capsule capsule Take 1 capsule by mouth 1 (One) Time Per Week. 8 capsule 0     No current facility-administered medications on file prior to visit.       Allergies:   Allergies   Allergen Reactions    Shellfish-Derived Products Anaphylaxis    Sulfa Antibiotics Anaphylaxis and Unknown - Low Severity       Immunizations:  Immunization History   Administered Date(s) Administered    COVID-19 (PFIZER) Purple Cap Monovalent 01/20/2021    DTaP 11/04/2019    Fluzone (or Fluarix & Flulaval for VFC) >6mos 10/21/2013, 08/31/2015    Fluzone High Dose =>65 Years ([facility] ONLY) 09/15/2016, 09/07/2018, 10/28/2019    Fluzone High-Dose 65+yrs 09/15/2016, 09/07/2018, 10/28/2019, 11/23/2021, 12/07/2023    Influenza Quad Vaccine (Inpatient) 10/21/2013    Influenza Seasonal Injectable 11/07/2007, 11/03/2008, 09/30/2010, 09/01/2017, 10/01/2019    Influenza, Unspecified 10/31/2011    Pneumococcal Conjugate 13-Valent (PCV13) 08/27/2012, 08/31/2015    Pneumococcal Conjugate 20-Valent (PCV20) 12/07/2023    Pneumococcal Polysaccharide (PPSV23) 08/27/2012    Tdap 01/30/2012, 10/01/2019        Family History:  Family History   Problem Relation Age of Onset    COPD Mother     Hypertension Father     Alzheimer's disease Father     Dementia Father     Cancer Paternal Grandmother     Breast cancer Neg Hx     Ovarian cancer Neg Hx        Social History:  Social History  "    Socioeconomic History    Marital status: Single   Tobacco Use    Smoking status: Never     Passive exposure: Never    Smokeless tobacco: Never   Vaping Use    Vaping status: Never Used   Substance and Sexual Activity    Alcohol use: No    Drug use: No    Sexual activity: Defer       Objective   /74   Pulse 65   Ht 160 cm (62.99\")   Wt 80.7 kg (178 lb)   SpO2 97%   BMI 31.54 kg/m²     Physical Exam  Vitals reviewed.   Constitutional:       General: She is not in acute distress.     Appearance: Normal appearance.   HENT:      Mouth/Throat:      Mouth: Mucous membranes are moist.   Eyes:      Conjunctiva/sclera: Conjunctivae normal.   Cardiovascular:      Rate and Rhythm: Normal rate and regular rhythm.      Heart sounds: Normal heart sounds. No murmur heard.  Pulmonary:      Effort: Pulmonary effort is normal. No respiratory distress.      Breath sounds: Normal breath sounds.   Abdominal:      General: Abdomen is flat. Bowel sounds are normal.      Palpations: Abdomen is soft.   Neurological:      Mental Status: She is alert.      Motor: Weakness present.      Comments: Strength 4/5 on Left UE/LE though, LE less strength relatively to the UE, 5/5 on Right UE/LE, using rolator today   Psychiatric:      Comments: flat         Assessment & Plan     1. Secondary Progressive MS   - Following at [facility] with Neuro.   - Tecfidera stopped at her last appt. Did not tolerate Nudexta.   - Neuro previously had stopped Amitriptyline, Baclofen.   - FU with Neuro at [facility] in 1year     2. Prediabetes  - last checked 12/2023, will check in 1 year     3. Other hyperlipidemia  - Lipid Panel; 12/2023  - atorvastatin (LIPITOR) 40 MG tablet; Take 1 tablet by mouth Every Night.       4. Vitamin D deficiency  - finished Vitamin D high dose currently  - Vit D level 4/2024 nml     5. Peripheral polyneuropathy and B12 Deficiency  - neuropathic pain in feet likely due to MS  - Vitamin B12; check last visit, " resumed B12  - gabapentin continue, not due for refill yet  - will continue longterm script  - signed contract on file  - UDS 12/2023 last visit consistent with known Gabapentin   - Chalino queried without irregularities     6. Abnormal Thyroid Labs   - Started Levothyroxine last visit and will check TSH/FT4 today     7. Generalized anxiety disorder, Depression  - uncontrolled  - Buspar stopped today  - continue Prozac 60mg  - discussed therapy with Cecilia Suarez, will continue to think about talk therapy  - previously on Xanax  - will start Hydroxyzine today     8. Primary hypertension  - controlled  - amLODIPine (NORVASC) 10 MG tablet; Take 1 tablet by mouth Daily  - carvedilol (COREG) 25 MG tablet; Take 1 tablet by mouth 2 (Two) Times a Day With Meals.  - valsartan (DIOVAN) 320 MG tablet; Take 1 tablet by mouth Daily.    - previously on Hydralazine and Spironolactone which are no longer required     9. Allergic rhinitis, unspecified seasonality, unspecified trigger  - continue Zyrtec  - montelukast (SINGULAIR) 10 MG tablet; Take 1 tablet by mouth Every Night.       10. Constipation, unspecified constipation type  - polyethylene glycol (MIRALAX), continue     11. Gastroesophageal reflux disease without esophagitis  - uncontrolled, will resume medication  - omeprazole (priLOSEC) 20 MG capsule; continue      12. Osteopenia  - Last DEXA Feb 2024 and indicated osteopenia, which is improved from last DEXA of osteoporosis  - continue high dose vit d, once vit d corrected.   - will not resume Fosamax as pt off for about 2yrs and scan improved     13. Tubular Adenoma   - 10mm. Recommended fu in 3 years from 7/2019. Signed KP to get copy from [facility]     14. Seizure  - on Carbmazepine  - had a seizure in 5/2024 and again this past week  - advised to call her neurologist to let him know as may want to see sooner or change meds  - pt will call    [facility]  - mammo completed  5/2024  - cscope 7/2019, 10 mm tubular adenoma with 3 yr  follow up  - encouraged to get Shingrix  - Discuss COVID vaccine at next viist     FU in 3mo       Irene Bailey MD, FAAP, FACP  Internal Medicine and Pediatrics  [facility]

## 2024-07-27 LAB
T4 FREE SERPL-MCNC: 0.94 NG/DL (ref 0.92–1.68)
TSH SERPL DL<=0.05 MIU/L-ACNC: 1.42 UIU/ML (ref 0.27–4.2)

## 2024-07-29 DIAGNOSIS — E03.9 HYPOTHYROIDISM, UNSPECIFIED TYPE: ICD-10-CM

## 2024-07-29 RX ORDER — LEVOTHYROXINE SODIUM 0.05 MG/1
50 TABLET ORAL
Qty: 90 TABLET | Refills: 1 | Status: SHIPPED | OUTPATIENT
Start: 2024-07-29

## 2024-08-13 DIAGNOSIS — G62.9 PERIPHERAL POLYNEUROPATHY: ICD-10-CM

## 2024-08-13 RX ORDER — GABAPENTIN 300 MG/1
300 CAPSULE ORAL 2 TIMES DAILY
Qty: 60 CAPSULE | Refills: 0 | Status: SHIPPED | OUTPATIENT
Start: 2024-08-13

## 2024-08-13 NOTE — TELEPHONE ENCOUNTER
Rx Refill Note  Requested Prescriptions     Pending Prescriptions Disp Refills    gabapentin (NEURONTIN) 300 MG capsule 60 capsule 0     Sig: Take 1 capsule by mouth 2 (Two) Times a Day.      Last office visit with prescribing clinician: 7/26/2024   Last telemedicine visit with prescribing clinician: Visit date not found   Next office visit with prescribing clinician: 10/31/2024                         LOV - 07/26/2024  NOV - 10/31/2024  UDS - 12/7/2023  CSA - 12/7/2023

## 2024-09-13 DIAGNOSIS — F41.1 GENERALIZED ANXIETY DISORDER: ICD-10-CM

## 2024-09-13 RX ORDER — BUSPIRONE HYDROCHLORIDE 15 MG/1
15 TABLET ORAL 2 TIMES DAILY
Qty: 60 TABLET | Refills: 2 | Status: SHIPPED | OUTPATIENT
Start: 2024-09-13

## 2024-09-18 ENCOUNTER — TELEPHONE (OUTPATIENT)
Age: 74
End: 2024-09-18
Payer: MEDICARE

## 2024-09-30 DIAGNOSIS — G62.9 PERIPHERAL POLYNEUROPATHY: ICD-10-CM

## 2024-09-30 RX ORDER — GABAPENTIN 300 MG/1
300 CAPSULE ORAL 2 TIMES DAILY
Qty: 60 CAPSULE | Refills: 0 | Status: SHIPPED | OUTPATIENT
Start: 2024-09-30

## 2024-09-30 NOTE — TELEPHONE ENCOUNTER
Rx Refill Note  Requested Prescriptions     Pending Prescriptions Disp Refills    gabapentin (NEURONTIN) 300 MG capsule 60 capsule 0     Sig: Take 1 capsule by mouth 2 (Two) Times a Day.      Last office visit with prescribing clinician: 7/26/2024   Last telemedicine visit with prescribing clinician: Visit date not found   Next office visit with prescribing clinician: 10/31/2024       Michelle Clement MA  09/30/24, 17:21 EDT  CSA/UDS  12/3/23

## 2024-10-23 NOTE — TELEPHONE ENCOUNTER
"MEDICATION REFILL REQUEST    Caller: Odalis Mir \"JESSIKA\"    Relationship: Self    Best call back number: [phone]    Requested Prescriptions:   Requested Prescriptions     Pending Prescriptions Disp Refills    carBAMazepine (TEGretol) 200 MG tablet 270 tablet 3     Sig: Take 1 tablet by mouth 3 (Three) Times a Day.      Pharm[facility] where request should be sent: [id number] - [place], [place] - [address] AT [facility] [address]  [phone] [facility] [phone] FX     Last office visit with prescribing clinician: 11/30/2023   Last telemedicine visit with prescribing clinician: Visit date not found   Next office visit with prescribing clinician: 12/5/2024     Additional details provided by patient: PT REPORTS SHE HAS LESS THAN A 3-DAY SUPPLY OF THE CARBAMAZEPINE MEDICATION LEFT.    Does the patient have less than a 3 day supply?:  [x] Yes  [] No    Would you like a call back once the refill request has been completed?: [] Yes  [x] No    If the office needs to give you a call back, can they leave a voicemail?: [] Yes  [x] No    PLEASE REVIEW AND ADVISE.    Jeri Kinsey Rep   10/23/24 16:14 EDT  "

## 2024-10-24 RX ORDER — CARBAMAZEPINE 200 MG/1
200 TABLET ORAL 3 TIMES DAILY
Qty: 270 TABLET | Refills: 0 | Status: SHIPPED | OUTPATIENT
Start: 2024-10-24

## 2024-10-24 NOTE — TELEPHONE ENCOUNTER
Rx Refill Note  Requested Prescriptions     Pending Prescriptions Disp Refills    carBAMazepine (TEGretol) 200 MG tablet 270 tablet 3     Sig: Take 1 tablet by mouth 3 (Three) Times a Day.      Last filled:    11/30/23 w/3  Last office visit with prescribing clinician: 11/30/2023      Next office visit with prescribing clinician: 12/5/2024     Liliana Gonzales MA  10/24/24, 14:58 EDT

## 2024-10-31 ENCOUNTER — OFFICE VISIT (OUTPATIENT)
Age: 74
End: 2024-10-31
Payer: MEDICARE

## 2024-10-31 VITALS
BODY MASS INDEX: 32.25 KG/M2 | SYSTOLIC BLOOD PRESSURE: 124 MMHG | HEIGHT: 63 IN | WEIGHT: 182 LBS | HEART RATE: 84 BPM | DIASTOLIC BLOOD PRESSURE: 80 MMHG | OXYGEN SATURATION: 95 %

## 2024-10-31 DIAGNOSIS — K21.9 GASTROESOPHAGEAL REFLUX DISEASE WITHOUT ESOPHAGITIS: ICD-10-CM

## 2024-10-31 DIAGNOSIS — G35 MULTIPLE SCLEROSIS, SECONDARY PROGRESSIVE: ICD-10-CM

## 2024-10-31 DIAGNOSIS — Z51.81 THERAPEUTIC DRUG MONITORING: ICD-10-CM

## 2024-10-31 DIAGNOSIS — M85.89 OSTEOPENIA OF MULTIPLE SITES: ICD-10-CM

## 2024-10-31 DIAGNOSIS — G40.109 SEIZURE, TEMPORAL LOBE: Chronic | ICD-10-CM

## 2024-10-31 DIAGNOSIS — R13.10 DYSPHAGIA, UNSPECIFIED TYPE: Primary | ICD-10-CM

## 2024-10-31 DIAGNOSIS — G62.9 PERIPHERAL POLYNEUROPATHY: ICD-10-CM

## 2024-10-31 DIAGNOSIS — F32.A ANXIETY AND DEPRESSION: ICD-10-CM

## 2024-10-31 DIAGNOSIS — E03.9 HYPOTHYROIDISM, UNSPECIFIED TYPE: ICD-10-CM

## 2024-10-31 DIAGNOSIS — F41.1 GENERALIZED ANXIETY DISORDER: ICD-10-CM

## 2024-10-31 DIAGNOSIS — I10 PRIMARY HYPERTENSION: ICD-10-CM

## 2024-10-31 DIAGNOSIS — E55.9 VITAMIN D DEFICIENCY: ICD-10-CM

## 2024-10-31 DIAGNOSIS — F41.9 ANXIETY AND DEPRESSION: ICD-10-CM

## 2024-10-31 RX ORDER — OMEPRAZOLE 40 MG/1
40 CAPSULE, DELAYED RELEASE ORAL DAILY
Qty: 90 CAPSULE | Refills: 1 | Status: SHIPPED | OUTPATIENT
Start: 2024-10-31

## 2024-10-31 RX ORDER — AMLODIPINE BESYLATE 10 MG/1
10 TABLET ORAL DAILY
Qty: 90 TABLET | Refills: 1 | Status: SHIPPED | OUTPATIENT
Start: 2024-10-31

## 2024-10-31 RX ORDER — GABAPENTIN 300 MG/1
300 CAPSULE ORAL 2 TIMES DAILY
Qty: 60 CAPSULE | Refills: 0 | Status: SHIPPED | OUTPATIENT
Start: 2024-10-31 | End: 2024-11-01 | Stop reason: SDUPTHER

## 2024-10-31 RX ORDER — LEVOTHYROXINE SODIUM 50 UG/1
50 TABLET ORAL
Qty: 90 TABLET | Refills: 1 | Status: SHIPPED | OUTPATIENT
Start: 2024-10-31

## 2024-10-31 RX ORDER — BUSPIRONE HYDROCHLORIDE 15 MG/1
15 TABLET ORAL 2 TIMES DAILY
Qty: 60 TABLET | Refills: 2 | Status: SHIPPED | OUTPATIENT
Start: 2024-10-31

## 2024-10-31 NOTE — PROGRESS NOTES
"Progress Note    Subjective      Odalis \"JESSIKA\" is a 74 y.o. female.    Chief Complaint   Patient presents with    Multiple Sclerosis       HPI  HTN   - currently on Amlodipine and Coreg  - no CP, edema, HA     Depression/Anxiety  - previously on Prozac 60, Xanax, and Abilify  - Neuro stopped her Abilify due to pseudobulbar affect concern   - no SI/HI  - feels like anxiety is not well controlled and is most problematic  - not leaving the apartment much  - enjoying tv and reading  - not seeing the ladies from [employer]  - not really getting out and about  - Buspar increase did not help  - would like to try higher dose on Hydroxyzine    MS   - due to pseudobulbar affective disorder Tecfidera stopped per patient  - she was not placed on any new medication for MS  - weakness worst on the left and in LE, however of late noted worse weakness on the left arm and leg  - also noted some swallowing difficulty  - using rolator when leaves apartment and using cane in her apartment  - she does not have any sensation issues right now other than chronic neuropathy in feet  - no longer using orthotic  - she had quit seeing all physicians but she got back in with Neurology and plan is for her to go back in 1 year  - she has not reached out to let him know worse weaknesss    Swallowing Difficulty  - noted the past few months difficulty with swallowing initially solids and then liquids  - no weight loss  - sometimes feel like she is getting choked  - increased left sided weakness with MS  - GERD not well controlled    Low Back Pain  - no longer on Hydrocodone and not really having significant back pain  - has been off for some time  - does have neuropathy in feet unclear source, no pain radiating down leg     HPL  - continues on statin     Seizure  - continues on Carbmazepine  - had a seizure per patient that she can't remember details of event but found by caregiver with body wide shaking  - had another episode 1 week ago with head " shaking and bilateral hand shaking lasted around 10min  - has not contacted neuro     Allergic Rhinitis  - continues on Singulair and Zyrtec     Constipation  - using Miralax     GERD  - using Omeprazole again since last visit and feel that this has helped     Osteopenia  - previously on Fosamax for osteoporosis, but not currently taking  - since last visit DEXA shows only osteopenia  Past Medical History:  Patient Active Problem List   Diagnosis    Closed fracture of lower end of right radius with routine healing    Closed fracture of right distal radius    Moderate episode of recurrent major depressive disorder    PBA (pseudobulbar affect)    Seizure, temporal lobe    Gait abnormality    Chronic fatigue    Allergic rhinitis    Anxiety and depression    Colon polyp    Gastroesophageal reflux disease without esophagitis    Hyperlipidemia    Hypertension    Multiple sclerosis, secondary progressive    Pre-diabetes    Pulmonary embolus    Spasticity    Urgency incontinence    Vitamin D deficiency    Osteopenia of multiple sites    Hypothyroidism       Medications:  Current Outpatient Medications on File Prior to Visit   Medication Sig Dispense Refill    atorvastatin (LIPITOR) 40 MG tablet Take 1 tablet by mouth Every Night. 90 tablet 1    carBAMazepine (TEGretol) 200 MG tablet Take 1 tablet by mouth 3 (Three) Times a Day. 270 tablet 0    carvedilol (COREG) 25 MG tablet Take 1 tablet by mouth 2 (Two) Times a Day With Meals. 180 tablet 1    cetirizine (zyrTEC) 10 MG tablet Take 1 tablet by mouth Daily.      EPINEPHrine (EpiPen 2-Mic) 0.3 MG/0.3ML solution auto-injector injection Inject 0.3 mL under the skin into the appropriate area as directed 1 (One) Time.      FLUoxetine (PROzac) 60 MG tablet Take 1 tablet by mouth Daily. 90 tablet 3    hydrOXYzine (ATARAX) 25 MG tablet 0.5-1 tab po q8hr PRN anxiety 60 tablet 1    montelukast (SINGULAIR) 10 MG tablet Take 1 tablet by mouth Every Night. 90 tablet 1    nitroglycerin  (Nitrostat) 0.4 MG SL tablet Place 1 tablet under the tongue Every 5 (Five) Minutes As Needed.      polyethylene glycol (MIRALAX) 17 GM/SCOOP powder Take 17 g by mouth Daily. 510 g 1    polyethylene glycol (MIRALAX) packet Take 17 g by mouth As Needed (constipation).      valsartan (DIOVAN) 320 MG tablet Take 1 tablet by mouth Daily. 90 tablet 1    vitamin B-12 (CYANOCOBALAMIN) 1000 MCG tablet Take 1 tablet by mouth Daily. 30 tablet 2    vitamin D (ERGOCALCIFEROL) 1.25 MG (97872 UT) capsule capsule Take 1 capsule by mouth 1 (One) Time Per Week. 8 capsule 0    [DISCONTINUED] amLODIPine (NORVASC) 10 MG tablet Take 1 tablet by mouth Daily. 90 tablet 1    [DISCONTINUED] busPIRone (BUSPAR) 15 MG tablet Take 1 tablet by mouth 2 (Two) Times a Day. 2 tab po in AM and 1 tab po in PM 60 tablet 2    [DISCONTINUED] gabapentin (NEURONTIN) 300 MG capsule Take 1 capsule by mouth 2 (Two) Times a Day. 60 capsule 0    [DISCONTINUED] levothyroxine (SYNTHROID, LEVOTHROID) 50 MCG tablet Take 1 tablet by mouth Every Morning. 90 tablet 1    [DISCONTINUED] omeprazole (priLOSEC) 20 MG capsule Take 1 capsule by mouth Daily. 90 capsule 1     No current facility-administered medications on file prior to visit.       Allergies:   Allergies   Allergen Reactions    Shellfish-Derived Products Anaphylaxis    Sulfa Antibiotics Anaphylaxis and Unknown - Low Severity       Immunizations:  Immunization History   Administered Date(s) Administered    COVID-19 (PFIZER) Purple Cap Monovalent 01/20/2021    DTaP 11/04/2019    Fluzone  >6mos 10/21/2013    Fluzone (or Fluarix & Flulaval for VFC) >6mos 10/21/2013, 08/31/2015    Fluzone High-Dose 65+YRS 09/15/2016, 09/07/2018, 10/28/2019, 10/02/2024    Fluzone High-Dose 65+yrs 09/15/2016, 09/07/2018, 10/28/2019, 11/23/2021, 12/07/2023    Influenza Seasonal Injectable 11/07/2007, 11/03/2008, 09/30/2010, 09/01/2017, 10/01/2019    Influenza, Unspecified 10/31/2011    Pneumococcal Conjugate 13-Valent (PCV13)  "08/27/2012, 08/31/2015    Pneumococcal Conjugate 20-Valent (PCV20) 12/07/2023    Pneumococcal Polysaccharide (PPSV23) 08/27/2012    Shingrix 10/02/2024    Tdap 01/30/2012, 10/01/2019        Family History:  Family History   Problem Relation Age of Onset    COPD Mother     Hypertension Father     Alzheimer's disease Father     Dementia Father     Cancer Paternal Grandmother     Breast cancer Neg Hx     Ovarian cancer Neg Hx        Social History:  Social History     Socioeconomic History    Marital status: Single   Tobacco Use    Smoking status: Never     Passive exposure: Never    Smokeless tobacco: Never   Vaping Use    Vaping status: Never Used   Substance and Sexual Activity    Alcohol use: No    Drug use: No    Sexual activity: Defer       Objective   /80   Pulse 84   Ht 160 cm (62.99\")   Wt 82.6 kg (182 lb)   SpO2 95%   BMI 32.25 kg/m²             Physical Exam  Vitals reviewed.   Constitutional:       General: She is not in acute distress.  HENT:      Nose: Nose normal.      Mouth/Throat:      Mouth: Mucous membranes are moist.   Eyes:      Conjunctiva/sclera: Conjunctivae normal.   Neck:      Comments: No thyroid enlargement or nodularity  Cardiovascular:      Rate and Rhythm: Normal rate and regular rhythm.      Heart sounds: Normal heart sounds. No murmur heard.  Pulmonary:      Effort: Pulmonary effort is normal. No respiratory distress.      Breath sounds: Normal breath sounds.   Abdominal:      General: Abdomen is flat. Bowel sounds are normal. There is no distension.      Palpations: Abdomen is soft.      Tenderness: There is no abdominal tenderness.   Neurological:      Mental Status: She is alert.      Comments: 3-4/5 UE/LE strength on left  Gait supported with cane  5/5 strength in UE/LE on right           1. Secondary Progressive MS   - Following at [facility] with Neuro.   - Tecfidera stopped at her last appt. Did not tolerate Nudexta.   - Neuro previously had stopped " Amitriptyline, Baclofen.   - FU with Neuro at [facility] in 1year, 12/2024  - increased Left UE/LE weakness since last visit, advised to contact Neuro to see if could be seen sooner     2. Prediabetes  - last checked 12/2023, will check in 1 year, which will be next visit     3. Other hyperlipidemia  - Lipid Panel; 12/2023  - atorvastatin (LIPITOR) 40 MG tablet; Take 1 tablet by mouth Every Night.    - check next visit     4. Vitamin D deficiency  - finished Vitamin D high dose currently  - Vit D level 4/2024 nml  - check next visit     5. Peripheral polyneuropathy and B12 Deficiency  - neuropathic pain in feet likely due to MS  - Vitamin B12; check last visit, resumed B12  - gabapentin continue, refilled today  - will continue longterm script  - signed contract on file, will sign new contract next visit  - UDS 12/2023 last visit consistent with known Gabapentin, obtained new UDS today  - Chalino queried without irregularities     6. Abnormal Thyroid Labs   - continue Levothyroxine, refilled  - will check labs next visit     7. Generalized anxiety disorder, Depression  - uncontrolled  - Buspar stopped today  - continue Prozac 60mg  - discussed therapy with Cecilia Gregory, will continue to think about talk therapy  - previously on Xanax  - will increase Hydroxyzine today to 50mg q8     8. Primary hypertension  - controlled  - amLODIPine (NORVASC) 10 MG tablet; Take 1 tablet by mouth Daily, refilled  - carvedilol (COREG) 25 MG tablet; Take 1 tablet by mouth 2 (Two) Times a Day With Meals.  - valsartan (DIOVAN) 320 MG tablet; Take 1 tablet by mouth Daily.    - previously on Hydralazine and Spironolactone which are no longer required     9. Allergic rhinitis, unspecified seasonality, unspecified trigger  - continue Zyrtec  - montelukast (SINGULAIR) 10 MG tablet; Take 1 tablet by mouth Every Night.       10. Constipation, unspecified constipation type  - polyethylene glycol (MIRALAX), continue     11. Gastroesophageal reflux  disease without esophagitis  - uncontrolled, will resume medication  - omeprazole (priLOSEC) 40 MG capsule; continue, refilled at increased dose of 40mg      12. Osteopenia  - Last DEXA Feb 2024 and indicated osteopenia, which is improved from last DEXA of osteoporosis  - continue high dose vit d, once vit d corrected.   - will not resume Fosamax as pt off for about 2yrs and scan improved     13. Tubular Adenoma   - 10mm. Recommended fu in 3 years from 7/2019. Signed KP to get copy from [facility]  - discussed colonoscopy again today and not interested in pursuing and understands risk     14. Seizure  - on Carbmazepine  - had a seizure in 5/2024 and again this past week  - advised to call her neurologist to let him know as may want to see sooner or change meds  - pt will call    15. Dysphagia  - DDX: MS, uncontrolled GERD and stricture, mass, web/ring, thyroid, cerivcal spine  - ordered swallow study as sometimes feels like choking and MS weakness increased  - ordered EGD given progressive nature of solids and now solid sand liquid in setting of uncontrolled GERD    HCM  - mammo completed  5/2024  - cscope 7/2019, 10 mm tubular adenoma with 3 yr follow up  - encouraged to get Shingrix  - Discuss COVID vaccine at next viist     FU in 3mo    Irene Bailey MD, FAAP, FACP  Internal Medicine and Pediatrics  [facility]

## 2024-11-01 ENCOUNTER — HOSPITAL ENCOUNTER (OUTPATIENT)
Dept: GENERAL RADIOLOGY | Facility: HOSPITAL | Age: 74
Discharge: HOME | End: 2024-11-01
Payer: MEDICARE

## 2024-11-01 ENCOUNTER — TELEPHONE (OUTPATIENT)
Age: 74
End: 2024-11-01
Payer: MEDICARE

## 2024-11-01 DIAGNOSIS — G62.9 PERIPHERAL POLYNEUROPATHY: ICD-10-CM

## 2024-11-01 DIAGNOSIS — R13.10 DYSPHAGIA, UNSPECIFIED TYPE: ICD-10-CM

## 2024-11-01 DIAGNOSIS — I10 PRIMARY HYPERTENSION: ICD-10-CM

## 2024-11-01 DIAGNOSIS — F41.1 GENERALIZED ANXIETY DISORDER: ICD-10-CM

## 2024-11-01 DIAGNOSIS — E03.9 HYPOTHYROIDISM, UNSPECIFIED TYPE: ICD-10-CM

## 2024-11-01 DIAGNOSIS — K21.9 GASTROESOPHAGEAL REFLUX DISEASE WITHOUT ESOPHAGITIS: ICD-10-CM

## 2024-11-01 PROCEDURE — 74230 X-RAY XM SWLNG FUNCJ C+: CPT

## 2024-11-01 PROCEDURE — 92611 MOTION FLUOROSCOPY/SWALLOW: CPT

## 2024-11-01 PROCEDURE — 63710000001 BARIUM SULFATE 40 % RECONSTITUTED SUSPENSION: Performed by: INTERNAL MEDICINE

## 2024-11-01 PROCEDURE — 63710000001 BARIUM SULFATE 40 % PASTE: Performed by: INTERNAL MEDICINE

## 2024-11-01 PROCEDURE — A9270 NON-COVERED ITEM OR SERVICE: HCPCS | Performed by: INTERNAL MEDICINE

## 2024-11-01 RX ORDER — BUSPIRONE HYDROCHLORIDE 15 MG/1
15 TABLET ORAL 2 TIMES DAILY
Qty: 60 TABLET | Refills: 2 | Status: CANCELLED | OUTPATIENT
Start: 2024-11-01

## 2024-11-01 RX ORDER — LEVOTHYROXINE SODIUM 50 UG/1
50 TABLET ORAL
Qty: 90 TABLET | Refills: 1 | Status: CANCELLED | OUTPATIENT
Start: 2024-11-01

## 2024-11-01 RX ORDER — GABAPENTIN 300 MG/1
300 CAPSULE ORAL 2 TIMES DAILY
Qty: 60 CAPSULE | Refills: 0 | Status: SHIPPED | OUTPATIENT
Start: 2024-11-01

## 2024-11-01 RX ORDER — OMEPRAZOLE 40 MG/1
40 CAPSULE, DELAYED RELEASE ORAL DAILY
Qty: 90 CAPSULE | Refills: 1 | Status: CANCELLED | OUTPATIENT
Start: 2024-11-01

## 2024-11-01 RX ORDER — AMLODIPINE BESYLATE 10 MG/1
10 TABLET ORAL DAILY
Qty: 90 TABLET | Refills: 1 | Status: CANCELLED | OUTPATIENT
Start: 2024-11-01

## 2024-11-01 RX ADMIN — BARIUM SULFATE 100 ML: 0.81 POWDER, FOR SUSPENSION ORAL at 10:53

## 2024-11-01 RX ADMIN — BARIUM SULFATE 20 ML: 400 PASTE ORAL at 10:53

## 2024-11-01 NOTE — MBS/VFSS/FEES
Outpatient Speech Language Pathology   Adult Swallow Initial Evaluation  [facility]     Patient Name: Odalis Mir  : 1950  MRN: [id number]  Today's Date: 2024         Visit Date: 2024   Patient Active Problem List   Diagnosis    Closed fracture of lower end of right radius with routine healing    Closed fracture of right distal radius    Moderate episode of recurrent major depressive disorder    PBA (pseudobulbar affect)    Seizure, temporal lobe    Gait abnormality    Chronic fatigue    Allergic rhinitis    Anxiety and depression    Colon polyp    Gastroesophageal reflux disease without esophagitis    Hyperlipidemia    Hypertension    Multiple sclerosis, secondary progressive    Pre-diabetes    Pulmonary embolus    Spasticity    Urgency incontinence    Vitamin D deficiency    Osteopenia of multiple sites    Hypothyroidism        Past Medical History:   Diagnosis Date    Anxiety and depression     Elevated cholesterol     GERD (gastroesophageal reflux disease)     Hypertension     Multiple sclerosis     Osteoarthritis     Osteoporosis     Formatting of this note might be different from the original. - Last DEXA in 2018 with osteoporosis, declines repeat DEXA 2022.    Seizure         Past Surgical History:   Procedure Laterality Date    APPENDECTOMY      COLONOSCOPY      EYE SURGERY Right     Bleeding behind the retina     HAND SURGERY Right     thumb    HYSTERECTOMY      ORIF WRIST FRACTURE Right 2018    Procedure: RIGHT WRIST INTRA  ARTICULAR  DISTAL RADIUS FRACTURE OPEN REDUCTION INTERNAL FIXATION;  Surgeon: El Parks MD;  [facility]: [facility];  Service: Orthopedics         Visit Dx:     ICD-10-CM ICD-9-CM   1. Dysphagia, unspecified type  R13.10 787.20            OP SLP Assessment/Plan - 24 1210          SLP Assessment    Functional Problems Swallowing  -GA    Clinical Impression: Swallowing WNL  -GA              User Key  (r) = Recorded By, (t) = Taken By,  (c) = Cosigned By      Initials Name Provider Type    GA Abril Price MS CCC-SLP Speech and Language Pathologist                     SLP Adult Swallow Evaluation       Row Name 11/01/24 1040       Rehab Evaluation    Document Type evaluation  -GA    Subjective Information no complaints  -GA    Patient Observations alert;cooperative;agree to therapy  -GA    Patient/Family/Caregiver Comments/Observations none  -GA    Patient Effort good  -GA    Comment Patient reports frequent coughing and choking episodes with both liquids and solids. Patient with coughing everyday.  -GA    Symptoms Noted During/After Treatment none  -GA       General Information    Patient Profile Reviewed yes  -GA    Pertinent History Of Current Problem PCP referred patient for swallow assessment d/t complaints of swallowing difficulties.  -GA    Current Method of Nutrition regular textures;thin liquids  -GA    Prior Level of Function-Swallowing no diet consistency restrictions  -GA    Plans/Goals Discussed with patient;agreed upon  -GA    Barriers to Rehab none identified  -GA       Pain    Pretreatment Pain Rating 0/10 - no pain  -GA    Posttreatment Pain Rating 0/10 - no pain  -GA       Oral Motor Structure and Function    Dentition Assessment natural, present and adequate  -GA       Oral Musculature and Cranial Nerve Assessment    Oral Motor General Assessment WFL  -GA       General Eating/Swallowing Observations    Respiratory Support Currently in Use room air  -GA    Eating/Swallowing Skills self-fed  -GA    Positioning During Eating upright 90 degree;upright in chair  -GA    Utensils Used spoon;cup;straw  -GA    Consistencies Trialed regular textures;pureed;thin liquids  -GA       Respiratory    Respiratory Status WFL;room air  -GA       MBS/VFSS    Utensils Used spoon;cup;straw  -GA    Consistencies Trialed regular textures;pureed;thin liquids  -GA       MBS/VFSS Interpretation    Oral Prep Phase WFL  -GA    Oral Transit Phase WFL   mildly slowed but functional a-p transit  -GA    Oral Residue WFL  -GA    VFSS Summary Patient with osetophites and prominant CP which were reported by radiologist. May attribute to sensation that causes coughing and choking in home environemnt. 1 cough noted during evaluation but no aspiration/penetration occured. Patient without aspiration/penetration and adeqaute airway protection with all consistencies. Mildly slowed a-p transit. Patient reports this is d/t caution to reduced coughing and choking episodes.Continue with current d/t and recommend f/u with GI.  -GA       Initiation of Pharyngeal Swallow    Initiation of Pharyngeal Swallow WFL  -GA    Pharyngeal Phase functional pharyngeal phase of swallowing  -GA       Esophageal Phase    Esophageal Phase see radiology report for further details  -GA       SLP Evaluation Clinical Impression    SLP Swallowing Diagnosis functional oral phase;functional pharyngeal phase  -GA    Swallow Criteria for Skilled Therapeutic Interventions Met no problems identified which require skilled intervention  -GA       Recommendations    SLP Diet Recommendation regular textures;thin liquids  -GA    Oral Care Recommendations Oral Care BID/PRN;Toothbrush  -GA    SLP Rec. for Method of Medication Administration as tolerated  -GA    Anticipated Discharge Disposition (SLP) No further SLP services warranted  -GA              User Key  (r) = Recorded By, (t) = Taken By, (c) = Cosigned By      Initials Name Provider Type    Abril Sanches MS CCC-SLP Speech and Language Pathologist                                   OP SLP Education       Row Name 11/01/24 1210       Education    Barriers to Learning No barriers identified  -GA    Education Provided Described results of evaluation;Patient expressed understanding of evaluation  -GA    Assessed Learning motivation  -GA    Learning Motivation Strong  -GA    Learning Method Explanation;Demonstration;Other (comment)  showed video  -GA     Teaching Response Verbalized understanding  -GA              User Key  (r) = Recorded By, (t) = Taken By, (c) = Cosigned By      Initials Name Effective Dates    Abril Sanches MS CCC-SLP 09/14/23 -                                Time Calculation:   SLP Start Time: 1040  Untimed Charges  94457-[id number] Motion Fluoro Eval Swallow Minutes: 60  Total Minutes  Untimed Charges Total Minutes: 60   Total Minutes: 60    Therapy Charges for Today       Code Description Service Date Service Provider Modifiers Qty    08431579922 HC ST MOTION FLUORO EVAL SWALLOW 4 11/1/2024 Abril Price, MS CCC-SLP GN 1                     Abril Price MS CCC-SLP  11/1/2024

## 2024-11-01 NOTE — TELEPHONE ENCOUNTER
"  Caller: Odalis Mir \"JESSIKA\"    Relationship: Self    Best call back number: [phone]         What was the call regarding: PATIENT STATES THAT ALL HER MEDICATIONS WERE CALLED INTO THE WRONG [facility] AND SHE NEEDS THEM SENT TO THIS ONE   [facility] #[id number] - [place], [place] - [address]  AT [address]  & [address] [facility] - [phone] [facility] [phone]  [phone] AND WOULD LIKE A CALL BACK WHEN COMPLETED  Is it okay if the provider responds through MyChart:       "

## 2024-11-01 NOTE — TELEPHONE ENCOUNTER
Pts [facility] was closing and did not realize [facility] [address] when she gave us name of pharmacy yesterday. Called the correct pharmacy at [facility] and they are going to move the scripts to their location. I sent a new script for Gabapentin as that one can't move due to controlled. Let patient know that she could  there tonight or tomorrow.

## 2024-11-01 NOTE — TELEPHONE ENCOUNTER
Rx Refill Note  Requested Prescriptions     Pending Prescriptions Disp Refills    omeprazole (priLOSEC) 40 MG capsule 90 capsule 1     Sig: Take 1 capsule by mouth Daily.    busPIRone (BUSPAR) 15 MG tablet 60 tablet 2     Sig: Take 1 tablet by mouth 2 (Two) Times a Day. 2 tab po in AM and 1 tab po in PM    amLODIPine (NORVASC) 10 MG tablet 90 tablet 1     Sig: Take 1 tablet by mouth Daily.    gabapentin (NEURONTIN) 300 MG capsule 60 capsule 0     Sig: Take 1 capsule by mouth 2 (Two) Times a Day.    levothyroxine (SYNTHROID, LEVOTHROID) 50 MCG tablet 90 tablet 1     Sig: Take 1 tablet by mouth Every Morning.      Last office visit with prescribing clinician: 10/31/2024   Last telemedicine visit with prescribing clinician: Visit date not found   Next office visit with prescribing clinician: 1/31/2025    PLEASE SEE MESSAGE BELOW. NEW PHARMACY UPDATED.

## 2024-11-04 ENCOUNTER — TELEPHONE (OUTPATIENT)
Age: 74
End: 2024-11-04

## 2024-11-04 NOTE — TELEPHONE ENCOUNTER
Patient stated she is not able to  her Gabapentin for [facility] and would like to know if there is anything that can be done.

## 2024-11-08 DIAGNOSIS — I10 PRIMARY HYPERTENSION: ICD-10-CM

## 2024-11-08 LAB
1OH-MIDAZOLAM UR QL SCN: NOT DETECTED NG/MG CREAT
6MAM UR QL SCN: NEGATIVE NG/ML
7AMINOCLONAZEPAM/CREAT UR: NOT DETECTED NG/MG CREAT
A-OH ALPRAZ/CREAT UR: NOT DETECTED NG/MG CREAT
A-OH-TRIAZOLAM/CREAT UR CFM: NOT DETECTED NG/MG CREAT
ACP UR QL CFM: NOT DETECTED
ALPRAZ/CREAT UR CFM: NOT DETECTED NG/MG CREAT
AMPHETAMINES UR QL SCN: NEGATIVE NG/ML
APAP UR QL SCN: NORMAL UG/ML
APAP UR QL: NORMAL
APAP UR-MCNC: PRESENT UG/ML
BARBITURATES UR QL SCN: NEGATIVE NG/ML
BENZODIAZ SCN METH UR: NEGATIVE
BUPRENORPHINE UR QL SCN: NEGATIVE
BUPRENORPHINE/CREAT UR: NOT DETECTED NG/MG CREAT
CANNABINOIDS UR QL SCN: NEGATIVE NG/ML
CARISOPRODOL UR QL: NEGATIVE NG/ML
CLONAZEPAM/CREAT UR CFM: NOT DETECTED NG/MG CREAT
COCAINE+BZE UR QL SCN: NEGATIVE NG/ML
CREAT UR-MCNC: 75 MG/DL
D-METHORPHAN UR-MCNC: NOT DETECTED NG/ML
D-METHORPHAN+LEVORPHANOL UR QL: NOT DETECTED
DESALKYLFLURAZ/CREAT UR: NOT DETECTED NG/MG CREAT
DIAZEPAM/CREAT UR: NOT DETECTED NG/MG CREAT
ETHANOL UR QL SCN: NEGATIVE G/DL
ETHANOL UR QL SCN: NEGATIVE NG/ML
FENTANYL CTO UR SCN-MCNC: NEGATIVE NG/ML
FENTANYL/CREAT UR: NOT DETECTED NG/MG CREAT
FLUNITRAZEPAM UR QL SCN: NOT DETECTED NG/MG CREAT
GABAPENTIN UR CFM-MCNC: PRESENT NG/ML
GABAPENTIN UR QL CFM: NORMAL
GABAPENTIN UR-MCNC: NORMAL UG/ML
HALLUCINOGENS UR: NEGATIVE
HYPNOTICS UR QL SCN: NEGATIVE
KETAMINE UR QL: NOT DETECTED
LORAZEPAM/CREAT UR: NOT DETECTED NG/MG CREAT
MEPERIDINE UR QL SCN: NEGATIVE NG/ML
METHADONE UR QL SCN: NEGATIVE NG/ML
METHADONE+METAB UR QL SCN: NEGATIVE NG/ML
MIDAZOLAM/CREAT UR CFM: NOT DETECTED NG/MG CREAT
MISCELLANEOUS, UR: NEGATIVE
NORBUPRENORPHINE/CREAT UR: NOT DETECTED NG/MG CREAT
NORDIAZEPAM/CREAT UR: NOT DETECTED NG/MG CREAT
NORFENTANYL/CREAT UR: NOT DETECTED NG/MG CREAT
NORFLUNITRAZEPAM UR-MCNC: NOT DETECTED NG/MG CREAT
NORKETAMINE UR-MCNC: NOT DETECTED UG/ML
OPIATES UR SCN-MCNC: NEGATIVE NG/ML
OXAZEPAM/CREAT UR: NOT DETECTED NG/MG CREAT
OXYCODONE CTO UR SCN-MCNC: NEGATIVE NG/ML
PCP UR QL SCN: NEGATIVE NG/ML
PRESCRIBED MEDICATIONS: NORMAL
PROPOXYPH UR QL SCN: NEGATIVE NG/ML
TAPENTADOL CTO UR SCN-MCNC: NEGATIVE NG/ML
TEMAZEPAM/CREAT UR: NOT DETECTED NG/MG CREAT
TRAMADOL UR QL SCN: NEGATIVE NG/ML
ZALEPLON UR-MCNC: NOT DETECTED NG/ML
ZOLPIDEM PHENYL-4-CARB UR QL SCN: NOT DETECTED
ZOLPIDEM UR QL SCN: NOT DETECTED
ZOPICLONE-N-OXIDE UR-MCNC: NOT DETECTED NG/ML

## 2024-11-08 RX ORDER — FLUOXETINE HYDROCHLORIDE 60 MG/1
60 TABLET, FILM COATED ORAL; ORAL DAILY
Qty: 90 TABLET | Refills: 3 | Status: SHIPPED | OUTPATIENT
Start: 2024-11-08

## 2024-11-08 RX ORDER — CARVEDILOL 25 MG/1
25 TABLET ORAL 2 TIMES DAILY WITH MEALS
Qty: 180 TABLET | Refills: 1 | Status: SHIPPED | OUTPATIENT
Start: 2024-11-08

## 2024-11-08 NOTE — TELEPHONE ENCOUNTER
Rx Refill Note  Requested Prescriptions     Pending Prescriptions Disp Refills    FLUoxetine (PROzac) 60 MG tablet 90 tablet 3     Sig: Take 1 tablet by mouth Daily.      Last filled: 11/30/2023, 90 with 3 Refills.   Last office visit with prescribing clinician: 11/30/2023      Next office visit with prescribing clinician: 12/5/2024     Lizbeth Arnold MA  11/08/24, 12:06 EST

## 2024-11-11 DIAGNOSIS — E78.49 OTHER HYPERLIPIDEMIA: ICD-10-CM

## 2024-11-11 RX ORDER — ATORVASTATIN CALCIUM 40 MG/1
40 TABLET, FILM COATED ORAL NIGHTLY
Qty: 90 TABLET | Refills: 1 | Status: SHIPPED | OUTPATIENT
Start: 2024-11-11

## 2024-12-05 ENCOUNTER — OFFICE VISIT (OUTPATIENT)
Dept: NEUROLOGY | Facility: CLINIC | Age: 74
End: 2024-12-05
Payer: MEDICARE

## 2024-12-05 VITALS
DIASTOLIC BLOOD PRESSURE: 64 MMHG | WEIGHT: 182.1 LBS | OXYGEN SATURATION: 97 % | HEART RATE: 64 BPM | SYSTOLIC BLOOD PRESSURE: 126 MMHG | BODY MASS INDEX: 32.27 KG/M2 | HEIGHT: 63 IN

## 2024-12-05 DIAGNOSIS — G35 MULTIPLE SCLEROSIS, SECONDARY PROGRESSIVE: Primary | ICD-10-CM

## 2024-12-05 DIAGNOSIS — G40.109 SEIZURE, TEMPORAL LOBE: Chronic | ICD-10-CM

## 2024-12-05 DIAGNOSIS — F33.1 MODERATE EPISODE OF RECURRENT MAJOR DEPRESSIVE DISORDER: Chronic | ICD-10-CM

## 2024-12-05 DIAGNOSIS — F48.2 PBA (PSEUDOBULBAR AFFECT): Chronic | ICD-10-CM

## 2024-12-05 PROCEDURE — 1159F MED LIST DOCD IN RCRD: CPT | Performed by: PSYCHIATRY & NEUROLOGY

## 2024-12-05 PROCEDURE — 99214 OFFICE O/P EST MOD 30 MIN: CPT | Performed by: PSYCHIATRY & NEUROLOGY

## 2024-12-05 PROCEDURE — 3078F DIAST BP <80 MM HG: CPT | Performed by: PSYCHIATRY & NEUROLOGY

## 2024-12-05 PROCEDURE — 1160F RVW MEDS BY RX/DR IN RCRD: CPT | Performed by: PSYCHIATRY & NEUROLOGY

## 2024-12-05 PROCEDURE — 3074F SYST BP LT 130 MM HG: CPT | Performed by: PSYCHIATRY & NEUROLOGY
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
## 2024-12-10 ENCOUNTER — TELEPHONE (OUTPATIENT)
Age: 74
End: 2024-12-10
Payer: MEDICARE

## 2024-12-10 NOTE — TELEPHONE ENCOUNTER
Called pt and she stated that for three days she has had a cough, low grade fever and a sore throat. PT denied coming in for an appointment. Will get something otc

## 2024-12-10 NOTE — TELEPHONE ENCOUNTER
"  Caller: Odalis Mir \"JESSIKA\"    Relationship: Self    Best call back number: [phone]     What medication are you requesting: SOMETHING FOR FLU LIKE SYMPTOMS.    What are your current symptoms: SORE THROAT, COUGH,SINUS CONGESTION, SLIGHT FEVER    How long have you been experiencing symptoms: 3 DAYS    Have you had these symptoms before:    [x] Yes  [] No    Have you been treated for these symptoms before:   [x] Yes  [] No    If a prescription is needed, what is your preferred pharmacy and phone number: [facility] #[id number] [place], [place] - [address]  AT [address]  & [address] [facility] - [phone] [facility] [phone] FX     Additional notes: PT IS HAVING FLU LIKE SYMPTOMS AND WOULD LIKE SOME MEDICATION SENT TO PHARMACY.    "

## 2024-12-13 NOTE — TELEPHONE ENCOUNTER
Spoke with Ms. Mir and she said still not feeling well. Advised mckenna tif not better by Monday as that will be 10+ days that we should see her. She is using the OTC products. She really did not want to come in. Explained that I like to see folks so I don't miss PNA and make sure that we swab and are treating the right thing. She expressed understanding. Glad to work in if needed on Monday.

## 2025-01-08 DIAGNOSIS — F32.A ANXIETY AND DEPRESSION: ICD-10-CM

## 2025-01-08 DIAGNOSIS — F41.1 GENERALIZED ANXIETY DISORDER: ICD-10-CM

## 2025-01-08 DIAGNOSIS — F41.9 ANXIETY AND DEPRESSION: ICD-10-CM

## 2025-01-08 DIAGNOSIS — I10 PRIMARY HYPERTENSION: ICD-10-CM

## 2025-01-08 RX ORDER — BUSPIRONE HYDROCHLORIDE 15 MG/1
15 TABLET ORAL 2 TIMES DAILY
Qty: 60 TABLET | Refills: 2 | Status: SHIPPED | OUTPATIENT
Start: 2025-01-08

## 2025-01-08 RX ORDER — VALSARTAN 320 MG/1
320 TABLET ORAL DAILY
Qty: 90 TABLET | Refills: 1 | Status: SHIPPED | OUTPATIENT
Start: 2025-01-08

## 2025-01-08 RX ORDER — HYDROXYZINE HYDROCHLORIDE 25 MG/1
TABLET, FILM COATED ORAL
Qty: 60 TABLET | Refills: 1 | Status: SHIPPED | OUTPATIENT
Start: 2025-01-08

## 2025-01-08 NOTE — TELEPHONE ENCOUNTER
Rx Refill Note  Requested Prescriptions     Pending Prescriptions Disp Refills    hydrOXYzine (ATARAX) 25 MG tablet 60 tablet 1     Si.5-1 tab po q8hr PRN anxiety    valsartan (DIOVAN) 320 MG tablet 90 tablet 1     Sig: Take 1 tablet by mouth Daily.    busPIRone (BUSPAR) 15 MG tablet 60 tablet 2     Sig: Take 1 tablet by mouth 2 (Two) Times a Day. 2 tab po in AM and 1 tab po in PM      Last office visit with prescribing clinician: 10/31/2024   Last telemedicine visit with prescribing clinician: Visit date not found   Next office visit with prescribing clinician: 2025     Michelle Clement MA  25, 13:58 EST

## 2025-01-08 NOTE — TELEPHONE ENCOUNTER
"  Caller: Odalis Mir \"JESSIKA\"    Relationship: Self    Best call back number: [phone]    Requested Prescriptions:   Requested Prescriptions     Pending Prescriptions Disp Refills    hydrOXYzine (ATARAX) 25 MG tablet 60 tablet 1     Si.5-1 tab po q8hr PRN anxiety    valsartan (DIOVAN) 320 MG tablet 90 tablet 1     Sig: Take 1 tablet by mouth Daily.    busPIRone (BUSPAR) 15 MG tablet 60 tablet 2     Sig: Take 1 tablet by mouth 2 (Two) Times a Day. 2 tab po in AM and 1 tab po in PM        Pharmacy where request should be sent: [facility] #[id number] - [place], [place] - [address]  AT [address]  & [address] [facility] - [phone]  - [phone] FX     Last office visit with prescribing clinician: 10/31/2024   Last telemedicine visit with prescribing clinician: Visit date not found   Next office visit with prescribing clinician: 2025         Does the patient have less than a 3 day supply:  [x] Yes  [] No    Would you like a call back once the refill request has been completed: [] Yes [x] No    If the office needs to give you a call back, can they leave a voicemail: [] Yes [x] No    Jeri Cao Rep   25 12:03 EST       "

## 2025-01-28 RX ORDER — CARBAMAZEPINE 200 MG/1
200 TABLET ORAL 3 TIMES DAILY
Qty: 270 TABLET | Refills: 0 | Status: SHIPPED | OUTPATIENT
Start: 2025-01-28

## 2025-01-28 NOTE — TELEPHONE ENCOUNTER
Caller: Odalis Mir    Best call back number:   Telephone Information:   Mobile [phone]     Requested Prescriptions:   Requested Prescriptions     Pending Prescriptions Disp Refills    carBAMazepine (TEGretol) 200 MG tablet 270 tablet 0     Sig: Take 1 tablet by mouth 3 (Three) Times a Day.        Pharm[facility] where [facility] be sent[id number] [address]  AT [address]  & [address] [facility] - [phone] [facility] [phone] FX     Last office visit with prescribing clinician: 12/5/2024   Last telemedicine visit with prescribing clinician: Visit date not found   Next office visit with prescribing clinician: 6/5/2025     Additional details provided by patient: PT HAS ABOUT 5 DAYS WORTH OF MEICATION LEFT    Does the patient have less than a 3 day supply:  [] Yes  [x] No    Would you like a call back once the refill request has been completed: [] Yes [x] No    If the office needs to give you a call back, can they leave a voicemail: [] Yes [x] No    Jeri Tyler Rep   01/28/25 12:00 EST

## 2025-01-28 NOTE — TELEPHONE ENCOUNTER
Rx Refill Note  Requested Prescriptions     Pending Prescriptions Disp Refills    carBAMazepine (TEGretol) 200 MG tablet 270 tablet 0     Sig: Take 1 tablet by mouth 3 (Three) Times a Day.      Last filled:  10/24/24 w/0  Last office visit with prescribing clinician: 12/5/2024      Next office visit with prescribing clinician: 6/5/2025     Liliana Gonzales MA  01/28/25, 12:41 EST

## 2025-01-31 ENCOUNTER — OFFICE VISIT (OUTPATIENT)
Age: 75
End: 2025-01-31
Payer: MEDICARE

## 2025-01-31 ENCOUNTER — LAB (OUTPATIENT)
Age: 75
End: 2025-01-31
Payer: MEDICAID

## 2025-01-31 VITALS
DIASTOLIC BLOOD PRESSURE: 80 MMHG | SYSTOLIC BLOOD PRESSURE: 122 MMHG | WEIGHT: 187 LBS | BODY MASS INDEX: 33.13 KG/M2 | OXYGEN SATURATION: 98 % | HEART RATE: 88 BPM | HEIGHT: 63 IN

## 2025-01-31 DIAGNOSIS — F32.A ANXIETY AND DEPRESSION: ICD-10-CM

## 2025-01-31 DIAGNOSIS — F41.9 ANXIETY AND DEPRESSION: ICD-10-CM

## 2025-01-31 DIAGNOSIS — G35 MULTIPLE SCLEROSIS, SECONDARY PROGRESSIVE: ICD-10-CM

## 2025-01-31 DIAGNOSIS — E03.9 HYPOTHYROIDISM, UNSPECIFIED TYPE: ICD-10-CM

## 2025-01-31 DIAGNOSIS — E55.9 VITAMIN D DEFICIENCY: ICD-10-CM

## 2025-01-31 DIAGNOSIS — I10 PRIMARY HYPERTENSION: ICD-10-CM

## 2025-01-31 DIAGNOSIS — J30.9 ALLERGIC RHINITIS, UNSPECIFIED SEASONALITY, UNSPECIFIED TRIGGER: ICD-10-CM

## 2025-01-31 DIAGNOSIS — N32.81 OAB (OVERACTIVE BLADDER): Primary | ICD-10-CM

## 2025-01-31 DIAGNOSIS — N39.41 URGENCY INCONTINENCE: ICD-10-CM

## 2025-01-31 DIAGNOSIS — R73.03 PRE-DIABETES: ICD-10-CM

## 2025-01-31 DIAGNOSIS — M85.89 OSTEOPENIA OF MULTIPLE SITES: ICD-10-CM

## 2025-01-31 DIAGNOSIS — E78.2 MIXED HYPERLIPIDEMIA: ICD-10-CM

## 2025-01-31 DIAGNOSIS — G40.109 SEIZURE, TEMPORAL LOBE: Chronic | ICD-10-CM

## 2025-01-31 DIAGNOSIS — K21.9 GASTROESOPHAGEAL REFLUX DISEASE WITHOUT ESOPHAGITIS: ICD-10-CM

## 2025-01-31 LAB
ALBUMIN SERPL-MCNC: 4.4 G/DL (ref 3.5–5.2)
ALBUMIN/GLOB SERPL: 1.3 G/DL
ALP SERPL-CCNC: 117 U/L (ref 39–117)
ALT SERPL W P-5'-P-CCNC: 9 U/L (ref 1–33)
ANION GAP SERPL CALCULATED.3IONS-SCNC: 11.8 MMOL/L (ref 5–15)
AST SERPL-CCNC: 18 U/L (ref 1–32)
BILIRUB SERPL-MCNC: 0.4 MG/DL (ref 0–1.2)
BUN SERPL-MCNC: 7 MG/DL (ref 8–23)
BUN/CREAT SERPL: 8 (ref 7–25)
CALCIUM SPEC-SCNC: 9.3 MG/DL (ref 8.6–10.5)
CHLORIDE SERPL-SCNC: 104 MMOL/L (ref 98–107)
CHOLEST SERPL-MCNC: 204 MG/DL (ref 0–200)
CO2 SERPL-SCNC: 28.2 MMOL/L (ref 22–29)
CREAT SERPL-MCNC: 0.88 MG/DL (ref 0.57–1)
EGFRCR SERPLBLD CKD-EPI 2021: 68.6 ML/MIN/1.73
GLOBULIN UR ELPH-MCNC: 3.3 GM/DL
GLUCOSE SERPL-MCNC: 103 MG/DL (ref 65–99)
HBA1C MFR BLD: 5.9 % (ref 4.8–5.6)
HDLC SERPL-MCNC: 73 MG/DL (ref 40–60)
LDLC SERPL CALC-MCNC: 113 MG/DL (ref 0–100)
LDLC/HDLC SERPL: 1.51 {RATIO}
POTASSIUM SERPL-SCNC: 4 MMOL/L (ref 3.5–5.2)
PROT SERPL-MCNC: 7.7 G/DL (ref 6–8.5)
SODIUM SERPL-SCNC: 144 MMOL/L (ref 136–145)
T4 FREE SERPL-MCNC: 0.93 NG/DL (ref 0.92–1.68)
TRIGL SERPL-MCNC: 104 MG/DL (ref 0–150)
TSH SERPL DL<=0.05 MIU/L-ACNC: 1.59 UIU/ML (ref 0.27–4.2)
VLDLC SERPL-MCNC: 18 MG/DL (ref 5–40)

## 2025-01-31 PROCEDURE — 3079F DIAST BP 80-89 MM HG: CPT | Performed by: INTERNAL MEDICINE

## 2025-01-31 PROCEDURE — 36415 COLL VENOUS BLD VENIPUNCTURE: CPT | Performed by: INTERNAL MEDICINE

## 2025-01-31 PROCEDURE — 84439 ASSAY OF FREE THYROXINE: CPT | Performed by: INTERNAL MEDICINE

## 2025-01-31 PROCEDURE — 85025 COMPLETE CBC W/AUTO DIFF WBC: CPT | Performed by: INTERNAL MEDICINE

## 2025-01-31 PROCEDURE — 83036 HEMOGLOBIN GLYCOSYLATED A1C: CPT | Performed by: INTERNAL MEDICINE

## 2025-01-31 PROCEDURE — 1125F AMNT PAIN NOTED PAIN PRSNT: CPT | Performed by: INTERNAL MEDICINE

## 2025-01-31 PROCEDURE — 84443 ASSAY THYROID STIM HORMONE: CPT | Performed by: INTERNAL MEDICINE

## 2025-01-31 PROCEDURE — 80053 COMPREHEN METABOLIC PANEL: CPT | Performed by: INTERNAL MEDICINE

## 2025-01-31 PROCEDURE — 3074F SYST BP LT 130 MM HG: CPT | Performed by: INTERNAL MEDICINE

## 2025-01-31 PROCEDURE — 80061 LIPID PANEL: CPT | Performed by: INTERNAL MEDICINE

## 2025-01-31 PROCEDURE — 82306 VITAMIN D 25 HYDROXY: CPT | Performed by: INTERNAL MEDICINE

## 2025-01-31 PROCEDURE — 99214 OFFICE O/P EST MOD 30 MIN: CPT | Performed by: INTERNAL MEDICINE

## 2025-01-31 RX ORDER — BUSPIRONE HYDROCHLORIDE 7.5 MG/1
TABLET ORAL
Qty: 90 TABLET | Refills: 1 | Status: SHIPPED | OUTPATIENT
Start: 2025-01-31

## 2025-01-31 RX ORDER — MIRABEGRON 25 MG/1
25 TABLET, FILM COATED, EXTENDED RELEASE ORAL DAILY
Qty: 30 TABLET | Refills: 2 | Status: SHIPPED | OUTPATIENT
Start: 2025-01-31

## 2025-01-31 RX ORDER — MONTELUKAST SODIUM 10 MG/1
10 TABLET ORAL NIGHTLY
Qty: 90 TABLET | Refills: 1 | Status: SHIPPED | OUTPATIENT
Start: 2025-01-31

## 2025-01-31 RX ORDER — AMLODIPINE BESYLATE 10 MG/1
10 TABLET ORAL DAILY
Qty: 90 TABLET | Refills: 1 | Status: SHIPPED | OUTPATIENT
Start: 2025-01-31

## 2025-01-31 NOTE — PROGRESS NOTES
"Progress Note    Subjective      Odalis \"JESSIKA\" is a 74 y.o. female.    Chief Complaint   Patient presents with    Difficulty Swallowing       HPI  HTN   - currently on Amlodipine and Coreg  - no CP, edema, HA     Depression/Anxiety  - previously on Prozac 60, Xanax, and Abilify  - Neuro stopped her Abilify due to pseudobulbar affect concern   - no SI/HI  - feels like anxiety and depression are not well controlled  - not leaving the apartment much  - enjoying tv and reading  - not seeing the ladies from [place]  - not really getting out and about  - Buspar she feels like is helping, she wants to try TID dosing    MS   - due to pseudobulbar affective disorder Tecfidera stopped per patient  - she was not placed on any new medication for MS  - weakness worst on the left and in LE, however of late noted worse weakness on the left arm and leg  - also noted some swallowing difficulty  - using rolator when leaves apartment and using cane in her apartment  - she does not have any sensation issues right now other than chronic neuropathy in feet  - no longer using orthotic  - she saw Neuro and no changes made since last visit     Swallowing Difficulty  GERD  - noted the past few months difficulty with swallowing initially solids and then liquids  - no weight loss  - sometimes feel like she is getting choked  - increased left sided weakness with MS  - swallow study negative post last visit  - EGD was ordered but she was not contacted, she does not want to pursue the EGD    Low Back Pain  - no longer on Hydrocodone and not really having significant back pain  - has been off for some time  - does have neuropathy in feet unclear source, no pain radiating down leg     HPL  - continues on statin     Seizure  - continues on Carbmazepine  - saw Neuro Dec 2024     Allergic Rhinitis  - continues on Singulair and Zyrtec     Constipation  - using Miralax     GERD  - using Omeprazole again since last visit and feel that this has helped   "   Osteopenia  - previously on Fosamax for osteoporosis, but not currently taking  - since last visit DEXA shows only osteopenia    Past Medical History:  Patient Active Problem List   Diagnosis    Closed fracture of lower end of right radius with routine healing    Closed fracture of right distal radius    Moderate episode of recurrent major depressive disorder    PBA (pseudobulbar affect)    Seizure, temporal lobe    Gait abnormality    Chronic fatigue    Allergic rhinitis    Anxiety and depression    Colon polyp    Gastroesophageal reflux disease without esophagitis    Hyperlipidemia    Hypertension    Multiple sclerosis, secondary progressive    Pre-diabetes    Pulmonary embolus    Spasticity    Urgency incontinence    Vitamin D deficiency    Osteopenia of multiple sites    Hypothyroidism       Medications:  Current Outpatient Medications on File Prior to Visit   Medication Sig Dispense Refill    amLODIPine (NORVASC) 10 MG tablet Take 1 tablet by mouth Daily. 90 tablet 1    atorvastatin (LIPITOR) 40 MG tablet Take 1 tablet by mouth Every Night. 90 tablet 1    busPIRone (BUSPAR) 15 MG tablet Take 1 tablet by mouth 2 (Two) Times a Day. 2 tab po in AM and 1 tab po in PM 60 tablet 2    carBAMazepine (TEGretol) 200 MG tablet Take 1 tablet by mouth 3 (Three) Times a Day. 270 tablet 0    carvedilol (COREG) 25 MG tablet Take 1 tablet by mouth 2 (Two) Times a Day With Meals. 180 tablet 1    cetirizine (zyrTEC) 10 MG tablet Take 1 tablet by mouth Daily.      EPINEPHrine (EpiPen 2-Mic) 0.3 MG/0.3ML solution auto-injector injection Inject 0.3 mL under the skin into the appropriate area as directed 1 (One) Time.      FLUoxetine (PROzac) 60 MG tablet Take 1 tablet by mouth Daily. 90 tablet 3    gabapentin (NEURONTIN) 300 MG capsule Take 1 capsule by mouth 2 (Two) Times a Day. 60 capsule 0    hydrOXYzine (ATARAX) 25 MG tablet 0.5-1 tab po q8hr PRN anxiety 60 tablet 1    levothyroxine (SYNTHROID, LEVOTHROID) 50 MCG tablet Take 1  tablet by mouth Every Morning. 90 tablet 1    montelukast (SINGULAIR) 10 MG tablet Take 1 tablet by mouth Every Night. 90 tablet 1    nitroglycerin (Nitrostat) 0.4 MG SL tablet Place 1 tablet under the tongue Every 5 (Five) Minutes As Needed.      omeprazole (priLOSEC) 40 MG capsule Take 1 capsule by mouth Daily. 90 capsule 1    polyethylene glycol (MIRALAX) 17 GM/SCOOP powder Take 17 g by mouth Daily. 510 g 1    polyethylene glycol (MIRALAX) packet Take 17 g by mouth As Needed (constipation).      valsartan (DIOVAN) 320 MG tablet Take 1 tablet by mouth Daily. 90 tablet 1    vitamin B-12 (CYANOCOBALAMIN) 1000 MCG tablet Take 1 tablet by mouth Daily. 30 tablet 2    vitamin D (ERGOCALCIFEROL) 1.25 MG (10105 UT) capsule capsule Take 1 capsule by mouth 1 (One) Time Per Week. 8 capsule 0     No current facility-administered medications on file prior to visit.       Allergies:   Allergies   Allergen Reactions    Shellfish-Derived Products Anaphylaxis    Sulfa Antibiotics Anaphylaxis and Unknown - Low Severity       Immunizations:  Immunization History   Administered Date(s) Administered    COVID-19 (PFIZER) Purple Cap Monovalent 01/20/2021    DTaP 11/04/2019    Fluzone  >6mos 10/21/2013    Fluzone (or Fluarix & Flulaval for VFC) >6mos 10/21/2013, 08/31/2015    Fluzone High-Dose 65+YRS 09/15/2016, 09/07/2018, 10/28/2019, 10/02/2024    Fluzone High-Dose 65+yrs 09/15/2016, 09/07/2018, 10/28/2019, 11/23/2021, 12/07/2023    Influenza Seasonal Injectable 11/07/2007, 11/03/2008, 09/30/2010, 09/01/2017, 10/01/2019    Influenza, Unspecified 10/31/2011    Pneumococcal Conjugate 13-Valent (PCV13) 08/27/2012, 08/31/2015    Pneumococcal Conjugate 20-Valent (PCV20) 12/07/2023    Pneumococcal Polysaccharide (PPSV23) 08/27/2012    Shingrix 10/02/2024    Tdap 01/30/2012, 10/01/2019        Family History:  Family History   Problem Relation Age of Onset    COPD Mother     Hypertension Father     Alzheimer's disease Father     Dementia  "Father     Cancer Paternal Grandmother     Breast cancer Neg Hx     Ovarian cancer Neg Hx        Social History:  Social History     Socioeconomic History    Marital status: Single   Tobacco Use    Smoking status: Never     Passive exposure: Never    Smokeless tobacco: Never   Vaping Use    Vaping status: Never Used   Substance and Sexual Activity    Alcohol use: No    Drug use: No    Sexual activity: Defer       Objective   /80   Pulse 88   Ht 160 cm (62.99\")   Wt 84.8 kg (187 lb)   SpO2 98%   BMI 33.13 kg/m²             Physical Exam  Vitals reviewed.   Constitutional:       General: She is not in acute distress.  HENT:      Nose: Nose normal.      Mouth/Throat:      Mouth: Mucous membranes are moist.   Eyes:      Conjunctiva/sclera: Conjunctivae normal.   Cardiovascular:      Rate and Rhythm: Normal rate and regular rhythm.      Heart sounds: Normal heart sounds. No murmur heard.  Pulmonary:      Effort: Pulmonary effort is normal. No respiratory distress.      Breath sounds: Normal breath sounds.   Abdominal:      General: Abdomen is flat. Bowel sounds are normal.      Palpations: Abdomen is soft.   Skin:     General: Skin is warm and dry.   Neurological:      Mental Status: She is alert.   Psychiatric:      Comments: Flat affect         Assessment & Plan     1. Secondary Progressive MS   - Following at [facility] with Neuro.   - Tecfidera stopped due to Pseudobulbar Affect. Did not tolerate Nudexta.   - Neuro previously had stopped Amitriptyline, Baclofen.   - FU with Neuro at [facility] in 1year, last seen 12/2024, next appt 12/2025  - Left UE/LE weakness     2. Prediabetes  - A1C today     3. Other hyperlipidemia  - Lipid Panel; 12/2023  - atorvastatin (LIPITOR) 40 MG tablet; Take 1 tablet by mouth Every Night.    - Lipids today     4. Vitamin D deficiency  - finished Vitamin D high dose currently  - Vit D level 4/2024 nml  - vitamin d today     5. Peripheral polyneuropathy and B12 " Deficiency  - neuropathic pain in feet likely due to MS  - Vitamin B12; check last visit, resumed B12  - gabapentin continue, refilled today  - will continue longterm script  - signed contract on file, will sign new contract next visit  - [facility] 120/2024  - [facility] queried without irregularities     6. Abnormal Thyroid Labs   - continue Levothyroxine, refilled  - will check TSH/FT4 today     7. Generalized anxiety disorder, Depression  - uncontrolled  - continue Prozac 60mg  - continue Buspar at 15mg BID, will add 7.5mg third dose into the day  - remains uninterested in counseling  - previously on Xanax  - Hydroxyzine PRN     8. Primary hypertension  - controlled  - amLODIPine (NORVASC) 10 MG tablet; Take 1 tablet by mouth Daily, refilled  - carvedilol (COREG) 25 MG tablet; Take 1 tablet by mouth 2 (Two) Times a Day With Meals.  - valsartan (DIOVAN) 320 MG tablet; Take 1 tablet by mouth Daily.    - previously on Hydralazine and Spironolactone which are no longer required     9. Allergic rhinitis, unspecified seasonality, unspecified trigger  - continue Zyrtec  - montelukast (SINGULAIR) 10 MG tablet; Take 1 tablet by mouth Every Night.  Refilled     10. Constipation, unspecified constipation type  - polyethylene glycol (MIRALAX), continue     11. Gastroesophageal reflux disease without esophagitis  - uncontrolled, will resume medication  - omeprazole (priLOSEC) 40 MG capsule; continue, refilled at increased dose of 40mg      12. Osteopenia  - Last DEXA Feb 2024 and indicated osteopenia, which is improved from last DEXA of osteoporosis  - continue high dose vit d, once vit d corrected.   - will not resume Fosamax as pt off for about 2yrs (2026) and scan improved     13. Tubular Adenoma   - 10mm. Recommended fu in 3 years from 7/2019. Signed KP to get copy from [facility]  - discussed colonoscopy again today and not interested in pursuing and understands risk     14. Seizure  - on Carbmazepine  - saw Neuro 12/2024     15.  Dysphagia  - DDX: MS, uncontrolled GERD and stricture, mass, web/ring, thyroid, cerivcal spine  - ordered swallow study, completed since last visit and negative  - ordered EGD last visit given progressive nature of solids and now solid sand liquid in setting of uncontrolled GERD, today reports sx better and does not want to pursue EGD at this point    16. OAB  - she is reporting normal volume UOP but she is going to urinate multiple times per day  - discussed that we could trial Myrbetriq  - discussed being aware if there is urinary retention    HCM  - mammo completed  5/2024  - cscope 7/2019, 10 mm tubular adenoma with 3 yr follow up  - encouraged to get Shingrix, COVID     FU in 3mo with signing new CSA next appt, Medicare wellness next visit    Irene Bailey MD, FAAP, FACP  Internal Medicine and Pediatrics  [facility] [place]

## 2025-02-01 LAB
25(OH)D3 SERPL-MCNC: 19.3 NG/ML (ref 30–100)
BASOPHILS # BLD AUTO: 0.02 10*3/MM3 (ref 0–0.2)
BASOPHILS NFR BLD AUTO: 0.3 % (ref 0–1.5)
DEPRECATED RDW RBC AUTO: 40.5 FL (ref 37–54)
EOSINOPHIL # BLD AUTO: 0.1 10*3/MM3 (ref 0–0.4)
EOSINOPHIL NFR BLD AUTO: 1.6 % (ref 0.3–6.2)
ERYTHROCYTE [DISTWIDTH] IN BLOOD BY AUTOMATED COUNT: 12.9 % (ref 12.3–15.4)
HCT VFR BLD AUTO: 41 % (ref 34–46.6)
HGB BLD-MCNC: 14 G/DL (ref 12–15.9)
IMM GRANULOCYTES # BLD AUTO: 0.01 10*3/MM3 (ref 0–0.05)
IMM GRANULOCYTES NFR BLD AUTO: 0.2 % (ref 0–0.5)
LYMPHOCYTES # BLD AUTO: 1.11 10*3/MM3 (ref 0.7–3.1)
LYMPHOCYTES NFR BLD AUTO: 18.2 % (ref 19.6–45.3)
MCH RBC QN AUTO: 29.9 PG (ref 26.6–33)
MCHC RBC AUTO-ENTMCNC: 34.1 G/DL (ref 31.5–35.7)
MCV RBC AUTO: 87.4 FL (ref 79–97)
MONOCYTES # BLD AUTO: 0.39 10*3/MM3 (ref 0.1–0.9)
MONOCYTES NFR BLD AUTO: 6.4 % (ref 5–12)
NEUTROPHILS NFR BLD AUTO: 4.48 10*3/MM3 (ref 1.7–7)
NEUTROPHILS NFR BLD AUTO: 73.3 % (ref 42.7–76)
NRBC BLD AUTO-RTO: 0 /100 WBC (ref 0–0.2)
PLATELET # BLD AUTO: 248 10*3/MM3 (ref 140–450)
PMV BLD AUTO: 9.9 FL (ref 6–12)
RBC # BLD AUTO: 4.69 10*6/MM3 (ref 3.77–5.28)
WBC NRBC COR # BLD AUTO: 6.11 10*3/MM3 (ref 3.4–10.8)

## 2025-02-03 ENCOUNTER — TELEPHONE (OUTPATIENT)
Age: 75
End: 2025-02-03
Payer: MEDICAID

## 2025-02-03 NOTE — TELEPHONE ENCOUNTER
"  Caller: Odalis Mir \"JESSIKA\"    Relationship: Self    Best call back number:[phone]    What is the best time to reach you: ANY TIME    Who are you requesting to speak with (clinical staff, provider,  specific staff member): DR SUE    Do you know the name of the person who called: SELF    What was the call regarding: PATIENT IS SICK AGAIN LIKE THE FLU AND WOULD LIKE TO TALK TO DR SUE.       "

## 2025-02-04 ENCOUNTER — TELEPHONE (OUTPATIENT)
Age: 75
End: 2025-02-04
Payer: MEDICAID

## 2025-02-04 RX ORDER — ERGOCALCIFEROL 1.25 MG/1
50000 CAPSULE ORAL WEEKLY
Qty: 4 CAPSULE | Refills: 1 | Status: SHIPPED | OUTPATIENT
Start: 2025-02-04

## 2025-02-04 NOTE — TELEPHONE ENCOUNTER
"Caller: Odalis Mir \"JESSIKA\"    Relationship to patient: Self    Best call back number: [phone]    Date of positive COVID19 test: 02/04/2025    COVID19 symptoms: COUGH, CONGESTION, CHILLS, BODY ACHES, LOSS OF TASTE, DIARRHEA    Additional information or concerns: PATIENT TOOK HOME COVID TEST AND IT WAS POSITIVE RIGHT AWAY. PATIENT IS DRINKING GATORADE. PLEASE ADVISE    What is the patients preferred pharmacy: [facility] [phone]        "

## 2025-02-04 NOTE — TELEPHONE ENCOUNTER
Spoke with patient and let her know that Paxlovid is contraindicated with Carbmazepine her seizure med. Advised if having any concerning sx including work of breathing to let us know and otherwise supportive care.

## 2025-02-04 NOTE — TELEPHONE ENCOUNTER
Spoke with patient and she is having URI sx and diarrhea. Not able to come back for testing. She has chills and achy and food does not taste right. Discussed that there were some meds for covid and flu but needed to test her to know if that is what it is. She is going to treat sx at home and start some gatorade, tylenol etc. She will keep us posted.

## 2025-02-24 DIAGNOSIS — G62.9 PERIPHERAL POLYNEUROPATHY: ICD-10-CM

## 2025-02-24 RX ORDER — GABAPENTIN 300 MG/1
300 CAPSULE ORAL 2 TIMES DAILY
Qty: 60 CAPSULE | Refills: 0 | Status: SHIPPED | OUTPATIENT
Start: 2025-02-24

## 2025-02-24 NOTE — TELEPHONE ENCOUNTER
"     Caller: Odalis Mir \"JESSIKA\"    Relationship: Self    Best call back number: [phone]    Requested Prescriptions:   Requested Prescriptions     Pending Prescriptions Disp Refills    gabapentin (NEURONTIN) 300 MG capsule 60 capsule 0     Sig: Take 1 capsule by mouth 2 (Two) Times a Day.        Pharmacy where request should be sent: [id number] [place], [place] - [address]  AT [address]  [address] [facility] [phone] [facility] [phone] [facility]     Last office visit with prescribing clinician: 1/31/2025   Last telemedicine visit with prescribing clinician: Visit date not found   Next office visit with prescribing clinician: 5/2/2025     Additional details provided by patient: NEEDS TODAY BECAUSE HER INS IS CHANGING TO [employer] ON 3/1/25    Does the patient have less than a 3 day supply:  [x] Yes  [] No    Would you like a call back once the refill request has been completed: [x] Yes [] No    If the office needs to give you a call back, can they leave a voicemail: [x] Yes [] No    Jeri Mart Rep   02/24/25 15:01 EST      " Referred by: Basim Cruz MD; Medical Diagnosis (from order):    Diagnosis Information      Diagnosis    M77.01 (ICD-10-CM) - Medial epicondylitis, right elbow    M77.11 (ICD-10-CM) - Lateral epicondylitis, right elbow                Daily Treatment Note    Visit:  27   Next referring provider appointment: 7/30/2021        SUBJECTIVE                                                                                                               \"I strained my shoulder from working out.\"  Pain / Symptoms:  Pain/symptom is: intermittent  Pain rating (out of 10): Current: 2 ; Worst: 3  Location: R medial and lateral elbow/forearm    OBJECTIVE                                                                                                                     Range of Motion (ROM)   (degrees unless noted; active unless noted; norms in ( ); negative=lacking to 0, positive=beyond 0)   Elbow/Forearm:     - Flexion (140-150):        • Left: 144         • Right: 144    - Extension (0):        • Left: -7        • Right: 3    Strength  (out of 5 unless noted, standard test position unless noted, lbs tested with hand held dynamometer)   Elbow/Forearm:    - Flexion:        • Right: 5     - Extension:        • Right: 5     - Supination:        • Right: 4+    - Pronation:        • Right: 4+  Wrist:    - Flexion:        • Right: 4+    - Extension:        • Right: 5  : (lbs)    - Neutral:        • Right: Trial(s): 101.4, 100.5, 91.7, Average: 97.87     norms: 40-44 years, male: left 94.1-131.5, right 96.1-137.5; female: left 48.5-76.1, right 56.9-83.9       TREATMENT                                                                                                                  Therapeutic Exercise:  See HEP below    Manual Therapy:  Soft and deep tissue mobilization to patient's right pronator, ECRB/L, extensor forearm, biceps/brachialis, and forearm flexors, with PROM focused on elbow extension.  STM to triceps and  ters/lats.  R wrist extension joint mobs and STM to R elbow extension joint mobs.      Kinesiotape 2 I-strips R wrist to reduce pain with supination at end range.    Skilled input: verbal instruction/cues    Writer verbally educated and received verbal consent for hand placement, positioning of patient, and techniques to be performed today from patient     Home Exercise Program/Education Materials: Access Code: [email or web address]  URL: [email or web address]  Date: 10/26/2021  Prepared by: Jovanny Page    Exercises  Standing Shoulder External Rotation with Resistance - 1 x daily - 3 x weekly - 2 sets - 15 reps  Standing Shoulder Horizontal Abduction with Resistance - 1 x daily - 3 x weekly - 2 sets - 15 reps  Shoulder Flexion Serratus Activation with Resistance - 1 x daily - 3 x weekly - 2 sets - 15 reps      Ultrasound (34684)  Location: R dorsoulnar wrist and ECRB/L 5 minutes each  Duty Cycle: 100%  Frequency: 1 Mhz  Intensity (w/cm2): 1.4  Duration: 10 minutes  Results: no change in symptoms immediately following  Reaction: no adverse reaction to treatment      ASSESSMENT                                                                                                             Issued new HEP to address c/o R shoulder pain  NO pain with HEP.  Pain/symptoms after session (out of 10): 2 and 1    To date the patient has made gains as expected as reported. Patient continues to have impairments and functional deficits as noted.  Patient will continue to benefit from skilled care as outlined.  Patient Education:   Results of above outlined education: Verbalizes understanding, Demonstrates understanding and Needs reinforcement      PLAN                                                                                                                           Updates to plan of care: continue current plan of care, extend current plan of care    Frequency / Duration: 1 times per month tapering as patient  progresses    Suggestions for next session as indicated: Progress per plan of care.        GOALS                                                                                                                           Long Term Goals: to be met by end of plan of care  1. Patient will increase R elbow ext AROM to 0  degrees by d/c in order to brush teeth.  Status: revised Met previous goal on 7/26/2021  2. Patient will increase R forearm supination/pronation AROM to 75/80 degrees by d/c in order to wash face.  Status: met   3. Patient will be independent with HEP by d/c in order to maximize RUE function for self-care tasks.  Status: progressing/ongoing  4. Increase R  strength=70# to be able to turn jars.  Status: met   5. Decrease R medial elbow and wrist pain = 2/10 to be able to carry groceries.   Status: progressing/ongoing  Referred by: Basim Cruz MD; Medical Diagnosis (from order):    Diagnosis Information      Diagnosis    M77.01 (ICD-10-CM) - Medial epicondylitis, right elbow    M77.11 (ICD-10-CM) - Lateral epicondylitis, right elbow                Occupational Therapy -  Daily Treatment Note  Next referring provider appointment: 6/18/2021        SUBJECTIVE                                                                                                             \"It's sore on the inside\" (R medial elbow).  Pain / Symptoms:  Pain/symptom is: intermittent  Pain rating (out of 10): Current: 2 Location: R radial wrist, triceps, and brachioradialis   Quality / Description: tight, sharp.    OBJECTIVE                                                                                                                     Range of Motion (ROM)   (degrees unless noted; active unless noted; norms in ( ); negative=lacking to 0, positive=beyond 0)   Elbow/Forearm:     - Flexion (140-150):        • Right: 140    - Extension (0):        • Right: 8    - Supination (80):        • Right: 80    - Pronation (80):         • Right: 73  Wrist:    - Flexion (60-80):        • Right:  62    - Extension (60-70):        • Right: 73    Strength  (out of 5 unless noted, standard test position unless noted, lbs tested with hand held dynamometer)   Elbow/Forearm:    - Flexion:        • Right: 5     - Extension:        • Right: 5   Wrist:    - Flexion:        • Right: 4    - Extension:        • Right: 4  : (lbs)    - Neutral:        • Left: Trial(s): 98.5        • Right: Trial(s): 92.5, 94.5, 95.2, Average: 94.07       TREATMENT                                                                                                                  Therapeutic Exercise:  NU STEP BUE with RUE only 4 minutes  Ab jan BUE sagittal plane, and 1 arm sagittal plane and transverse (alternating R and L) while weight bearing on dumbell to maintain neutral wrist/forearm and avoid excessive wrist extension.  TRX BUE with push and pull to engage scapula and core with  shoulder width and wider than shoulder width. leg to stabilize on the ground with 4 exercises to front and back  5# kettlebell RD/UD with wrist and hammer curls    Manual Therapy:  Soft and deep tissue mobilization an dHawkgrips to patient's right pronator, ECRB/L, extensor forearm, biceps/brachialis, and forearm flexors,PROM and Mulligan mobilizations to R elbow extension supine      Skilled input: verbal instruction/cues    Writer verbally educated and received verbal consent for hand placement, positioning of patient, and techniques to be performed today from patient       Ultrasound (05544)  Location: R medial elbow/pronator teres m.  Duty Cycle: 100%  Frequency: 1 Mhz  Intensity (w/cm2): 1.4  Duration: 8 minutes  Results: no change in symptoms immediately following  Reaction: no adverse reaction to treatment      ASSESSMENT                                                                                                             No c/o increased pain with therex.  Able to tolerate weight  bearing with Ab jan.  Pain/symptoms after session (out of 10): 2    To date the patient has made gains as expected as reported. Patient continues to have impairments and functional deficits as noted.  Patient will continue to benefit from skilled care as outlined.  Patient Education:   Results of above outlined education: Verbalizes understanding, Demonstrates understanding and Needs reinforcement      PLAN                                                                                                                           Updates to plan of care: continue current plan of care    Suggestions for next session as indicated: Progress per plan of care.        GOALS                                                                                                                           Long Term Goals: to be met by end of plan of care  1. Patient will increase R elbow flex/ext AROM to 145/5 degrees by d/c in order to brush teeth.  Status: revised  2. Patient will increase R forearm supination/pronation AROM to 75/80 degrees by d/c in order to wash face.  Status: met   3. Patient will be independent with HEP by d/c in order to maximize RUE function for self-care tasks.  Status: progressing/ongoing  4. Increase R  strength=70# to be able to turn jars.  Status: met       Therapy procedure time and total treatment time can be found documented on the Time Entry flowsheet    Therapy procedure time and total treatment time can be found documented on the Time Entry flowsheet

## 2025-02-24 NOTE — TELEPHONE ENCOUNTER
Rx Refill Note    Requested Prescriptions     Pending Prescriptions Disp Refills    gabapentin (NEURONTIN) 300 MG capsule 60 capsule 0     Sig: Take 1 capsule by mouth 2 (Two) Times a Day.      Last office visit with prescribing clinician: 1/31/2025   Last telemedicine visit with prescribing clinician: Visit date not found   Next office visit with prescribing clinician: 5/2/2025     Rupert De La Torre MA  02/24/25, 16:26 EST

## 2025-03-26 ENCOUNTER — TELEPHONE (OUTPATIENT)
Age: 75
End: 2025-03-26
Payer: MEDICAID

## 2025-03-26 NOTE — TELEPHONE ENCOUNTER
"Caller: Odalis Mir \"JESSIKA\"    Relationship: Self    Best call back number: [phone]     Medical supplies requested: [employer], PERSONAL [employer]HS     Prescribing Provider: DR. MOUNIKA SUE     Additional information or concerns: PATIENT USES [employer] PHONE: [phone]    PATIENT IS ALMOST OUT OF SUPPLIES         "

## 2025-03-27 NOTE — TELEPHONE ENCOUNTER
Will you call her company and ask them the status and if they got our fax. I signed and you faxed 3/20/25. Once you get an update will you let me and Ms. Mir know. Thanks.

## 2025-03-27 NOTE — TELEPHONE ENCOUNTER
Lvm with personal touch    Relay    We had faxed incontinence supply on 3/20 and 3/21. Did you receive the fax?

## 2025-03-27 NOTE — TELEPHONE ENCOUNTER
Name: PERSONAL TOUCH      Relationship:       Best Callback Number: [phone]      HUB PROVIDED THE RELAY MESSAGE FROM THE OFFICE      PATIENT: VOICED UNDERSTANDING AND HAS NO FURTHER QUESTIONS AT THIS TIME    ADDITIONAL INFORMATION: PERSONAL TOUCH DID RECEIVE OUR FAX FOR THE PATIENTS INCONTINENT SUPPLIES

## 2025-04-08 DIAGNOSIS — G62.9 PERIPHERAL POLYNEUROPATHY: ICD-10-CM

## 2025-04-10 RX ORDER — GABAPENTIN 300 MG/1
300 CAPSULE ORAL 2 TIMES DAILY
Qty: 60 CAPSULE | Refills: 0 | Status: SHIPPED | OUTPATIENT
Start: 2025-04-10

## 2025-04-10 NOTE — TELEPHONE ENCOUNTER
Rx Refill Note  Requested Prescriptions     Pending Prescriptions Disp Refills    gabapentin (NEURONTIN) 300 MG capsule [Pharmacy Med Name: GABAPENTIN 300MG CAPSULES] 60 capsule      Sig: TAKE 1 CAPSULE BY MOUTH TWICE DAILY        Michelle Clement MA  04/10/25, 16:53 EDT

## 2025-04-14 RX ORDER — CARBAMAZEPINE 200 MG/1
200 TABLET ORAL 3 TIMES DAILY
Qty: 90 TABLET | Refills: 1 | Status: SHIPPED | OUTPATIENT
Start: 2025-04-14

## 2025-04-14 NOTE — TELEPHONE ENCOUNTER
Rx Refill Note  Requested Prescriptions     Pending Prescriptions Disp Refills    carBAMazepine (TEGretol) 200 MG tablet [Pharmacy Med Name: CARBAMAZEPINE 200MG TABLETS] 270 tablet 0     Sig: TAKE 1 TABLET BY MOUTH THREE TIMES DAILY      Last filled: 1/28/25 270 with 0 refills.  Last office visit with prescribing clinician: 12/5/2024      Next office visit with prescribing clinician: 6/5/2025     Sent in 90 with 1 refill.    Lois Bocanegra MA  04/14/25, 15:36 EDT

## 2025-04-22 DIAGNOSIS — K21.9 GASTROESOPHAGEAL REFLUX DISEASE WITHOUT ESOPHAGITIS: ICD-10-CM

## 2025-04-22 RX ORDER — OMEPRAZOLE 40 MG/1
40 CAPSULE, DELAYED RELEASE ORAL DAILY
Qty: 90 CAPSULE | Refills: 1 | Status: SHIPPED | OUTPATIENT
Start: 2025-04-22

## 2025-04-22 NOTE — TELEPHONE ENCOUNTER
Rx Refill Note    Requested Prescriptions     Pending Prescriptions Disp Refills    omeprazole (priLOSEC) 40 MG capsule 90 capsule 1     Sig: Take 1 capsule by mouth Daily.      Last office visit with prescribing clinician: 1/31/2025   Last telemedicine visit with prescribing clinician: Visit date not found   Next office visit with prescribing clinician: 5/2/2025     Rupert De La Torre MA  04/22/25, 13:08 EDT

## 2025-04-22 NOTE — TELEPHONE ENCOUNTER
"Caller: Odalis Mir \"JESSIKA\"    Relationship: Self    Best call back number:   Telephone Information:   Mobile [phone]     Requested Prescriptions:   Requested Prescriptions     Pending Prescriptions Disp Refills    omeprazole (priLOSEC) 40 MG capsule 90 capsule 1     Sig: Take 1 capsule by mouth Daily.        Pharmacy where request should be sent:  [id number] [place], [place] - [address]  AT [facility] [facility]  & [address] [facility] [phone]  - [phone] FX     Last office visit with prescribing clinician: 1/31/2025   Last telemedicine visit with prescribing clinician: Visit date not found   Next office visit with prescribing clinician: 5/2/2025     Additional details provided by patient:     Does the patient have less than a 3 day supply:  [x] Yes  [] No    Would you like a call back once the refill request has been completed: [] Yes [x] No    If the office needs to give you a call back, can they leave a voicemail: [] Yes [x] No    Jeri Soto Rep   04/22/25 12:00 EDT         "

## 2025-05-02 ENCOUNTER — OFFICE VISIT (OUTPATIENT)
Age: 75
End: 2025-05-02
Payer: MEDICARE

## 2025-05-02 VITALS
WEIGHT: 190 LBS | BODY MASS INDEX: 33.66 KG/M2 | HEART RATE: 61 BPM | SYSTOLIC BLOOD PRESSURE: 128 MMHG | HEIGHT: 63 IN | OXYGEN SATURATION: 97 % | DIASTOLIC BLOOD PRESSURE: 80 MMHG

## 2025-05-02 DIAGNOSIS — E78.49 OTHER HYPERLIPIDEMIA: ICD-10-CM

## 2025-05-02 DIAGNOSIS — F32.A ANXIETY AND DEPRESSION: ICD-10-CM

## 2025-05-02 DIAGNOSIS — I10 PRIMARY HYPERTENSION: ICD-10-CM

## 2025-05-02 DIAGNOSIS — F41.1 GENERALIZED ANXIETY DISORDER: ICD-10-CM

## 2025-05-02 DIAGNOSIS — Z00.00 MEDICARE ANNUAL WELLNESS VISIT, SUBSEQUENT: Primary | ICD-10-CM

## 2025-05-02 DIAGNOSIS — K59.00 CONSTIPATION, UNSPECIFIED CONSTIPATION TYPE: ICD-10-CM

## 2025-05-02 DIAGNOSIS — F41.9 ANXIETY AND DEPRESSION: ICD-10-CM

## 2025-05-02 DIAGNOSIS — E53.8 B12 DEFICIENCY: ICD-10-CM

## 2025-05-02 DIAGNOSIS — N32.81 OAB (OVERACTIVE BLADDER): ICD-10-CM

## 2025-05-02 DIAGNOSIS — K21.9 GASTROESOPHAGEAL REFLUX DISEASE WITHOUT ESOPHAGITIS: ICD-10-CM

## 2025-05-02 DIAGNOSIS — E03.9 HYPOTHYROIDISM, UNSPECIFIED TYPE: ICD-10-CM

## 2025-05-02 DIAGNOSIS — J30.9 ALLERGIC RHINITIS, UNSPECIFIED SEASONALITY, UNSPECIFIED TRIGGER: ICD-10-CM

## 2025-05-02 RX ORDER — HYDROXYZINE HYDROCHLORIDE 25 MG/1
TABLET, FILM COATED ORAL
Qty: 60 TABLET | Refills: 1 | Status: SHIPPED | OUTPATIENT
Start: 2025-05-02

## 2025-05-02 RX ORDER — VALSARTAN 320 MG/1
320 TABLET ORAL DAILY
Qty: 90 TABLET | Refills: 3 | Status: SHIPPED | OUTPATIENT
Start: 2025-05-02

## 2025-05-02 RX ORDER — FLUOXETINE HYDROCHLORIDE 60 MG/1
60 TABLET, FILM COATED ORAL; ORAL DAILY
Qty: 90 TABLET | Refills: 3 | Status: SHIPPED | OUTPATIENT
Start: 2025-05-02

## 2025-05-02 RX ORDER — LANOLIN ALCOHOL/MO/W.PET/CERES
1000 CREAM (GRAM) TOPICAL DAILY
Qty: 90 TABLET | Refills: 3 | Status: SHIPPED | OUTPATIENT
Start: 2025-05-02

## 2025-05-02 RX ORDER — MIRABEGRON 50 MG/1
50 TABLET, FILM COATED, EXTENDED RELEASE ORAL DAILY
Qty: 90 TABLET | Refills: 3 | Status: SHIPPED | OUTPATIENT
Start: 2025-05-02

## 2025-05-02 RX ORDER — MONTELUKAST SODIUM 10 MG/1
10 TABLET ORAL NIGHTLY
Qty: 90 TABLET | Refills: 3 | Status: SHIPPED | OUTPATIENT
Start: 2025-05-02

## 2025-05-02 RX ORDER — BUSPIRONE HYDROCHLORIDE 15 MG/1
15 TABLET ORAL 2 TIMES DAILY
Qty: 270 TABLET | Refills: 3 | Status: SHIPPED | OUTPATIENT
Start: 2025-05-02

## 2025-05-02 RX ORDER — CARVEDILOL 25 MG/1
25 TABLET ORAL 2 TIMES DAILY WITH MEALS
Qty: 180 TABLET | Refills: 3 | Status: SHIPPED | OUTPATIENT
Start: 2025-05-02

## 2025-05-02 RX ORDER — ATORVASTATIN CALCIUM 40 MG/1
40 TABLET, FILM COATED ORAL NIGHTLY
Qty: 90 TABLET | Refills: 3 | Status: SHIPPED | OUTPATIENT
Start: 2025-05-02

## 2025-05-02 RX ORDER — OMEPRAZOLE 40 MG/1
40 CAPSULE, DELAYED RELEASE ORAL DAILY
Qty: 90 CAPSULE | Refills: 3 | Status: SHIPPED | OUTPATIENT
Start: 2025-05-02

## 2025-05-02 RX ORDER — AMLODIPINE BESYLATE 10 MG/1
10 TABLET ORAL DAILY
Qty: 90 TABLET | Refills: 3 | Status: SHIPPED | OUTPATIENT
Start: 2025-05-02

## 2025-05-02 RX ORDER — CETIRIZINE HYDROCHLORIDE 10 MG/1
10 TABLET ORAL DAILY
Qty: 90 TABLET | Refills: 3 | Status: SHIPPED | OUTPATIENT
Start: 2025-05-02

## 2025-05-02 RX ORDER — LEVOTHYROXINE SODIUM 50 UG/1
50 TABLET ORAL
Qty: 90 TABLET | Refills: 3 | Status: SHIPPED | OUTPATIENT
Start: 2025-05-02

## 2025-05-02 RX ORDER — POLYETHYLENE GLYCOL 3350 17 G/17G
17 POWDER, FOR SOLUTION ORAL DAILY
Qty: 510 G | Refills: 3 | Status: SHIPPED | OUTPATIENT
Start: 2025-05-02

## 2025-05-02 NOTE — ASSESSMENT & PLAN NOTE
Orders:    amLODIPine (NORVASC) 10 MG tablet; Take 1 tablet by mouth Daily.    carvedilol (COREG) 25 MG tablet; Take 1 tablet by mouth 2 (Two) Times a Day With Meals.    valsartan (DIOVAN) 320 MG tablet; Take 1 tablet by mouth Daily.

## 2025-05-02 NOTE — PROGRESS NOTES
Subjective   The ABCs of the Annual Wellness Visit  Medicare Wellness Visit      Odalis Mir is a 74 y.o. patient who presents for a Medicare Wellness Visit.    The following portions of the patient's history were reviewed and   updated as appropriate: allergies, current medications, past family history, past medical history, past social history, past surgical history, and problem list.    Compared to one year ago, the patient's physical   health is the same.  Compared to one year ago, the patient's mental   health is the same.    Recent Hospitalizations:  She was not admitted to the hospital during the last year.     Current Medical Providers:  Patient Care Team:  Irene Bailey MD as PCP - General (Internal Medicine)    Outpatient Medications Prior to Visit   Medication Sig Dispense Refill    amLODIPine (NORVASC) 10 MG tablet Take 1 tablet by mouth Daily. 90 tablet 1    atorvastatin (LIPITOR) 40 MG tablet Take 1 tablet by mouth Every Night. 90 tablet 1    busPIRone (BUSPAR) 15 MG tablet Take 1 tablet by mouth 2 (Two) Times a Day. 2 tab po in AM and 1 tab po in PM 60 tablet 2    busPIRone (BUSPAR) 7.5 MG tablet 15mg po am, 15mg po midday and 7.5mg po evening 90 tablet 1    carBAMazepine (TEGretol) 200 MG tablet TAKE 1 TABLET BY MOUTH THREE TIMES DAILY 90 tablet 1    carvedilol (COREG) 25 MG tablet Take 1 tablet by mouth 2 (Two) Times a Day With Meals. 180 tablet 1    cetirizine (zyrTEC) 10 MG tablet Take 1 tablet by mouth Daily.      EPINEPHrine (EpiPen 2-Mic) 0.3 MG/0.3ML solution auto-injector injection Inject 0.3 mL under the skin into the appropriate area as directed 1 (One) Time.      ergocalciferol (ERGOCALCIFEROL) 1.25 MG (37009 UT) capsule Take 1 capsule by mouth 1 (One) Time Per Week. 4 capsule 1    FLUoxetine (PROzac) 60 MG tablet Take 1 tablet by mouth Daily. 90 tablet 3    gabapentin (NEURONTIN) 300 MG capsule TAKE 1 CAPSULE BY MOUTH TWICE DAILY 60 capsule 0    hydrOXYzine (ATARAX) 25 MG  tablet 0.5-1 tab po q8hr PRN anxiety 60 tablet 1    levothyroxine (SYNTHROID, LEVOTHROID) 50 MCG tablet Take 1 tablet by mouth Every Morning. 90 tablet 1    Mirabegron ER (MYRBETRIQ) 25 MG tablet sustained-release 24 hour 24 hr tablet Take 1 tablet by mouth Daily. 30 tablet 2    montelukast (SINGULAIR) 10 MG tablet Take 1 tablet by mouth Every Night. 90 tablet 1    nitroglycerin (Nitrostat) 0.4 MG SL tablet Place 1 tablet under the tongue Every 5 (Five) Minutes As Needed.      omeprazole (priLOSEC) 40 MG capsule Take 1 capsule by mouth Daily. 90 capsule 1    polyethylene glycol (MIRALAX) 17 GM/SCOOP powder Take 17 g by mouth Daily. 510 g 1    polyethylene glycol (MIRALAX) packet Take 17 g by mouth As Needed (constipation).      valsartan (DIOVAN) 320 MG tablet Take 1 tablet by mouth Daily. 90 tablet 1    vitamin B-12 (CYANOCOBALAMIN) 1000 MCG tablet Take 1 tablet by mouth Daily. 30 tablet 2     No facility-administered medications prior to visit.     No opioid medication identified on active medication list. I have reviewed chart for other potential  high risk medication/s and harmful drug interactions in the elderly.      Aspirin is not on active medication list.  Aspirin use is not indicated based on review of current medical condition/s. Risk of harm outweighs potential benefits.  .    Patient Active Problem List   Diagnosis    Closed fracture of lower end of right radius with routine healing    Closed fracture of right distal radius    Moderate episode of recurrent major depressive disorder    PBA (pseudobulbar affect)    Seizure, temporal lobe    Gait abnormality    Chronic fatigue    Allergic rhinitis    Anxiety and depression    Colon polyp    Gastroesophageal reflux disease without esophagitis    Hyperlipidemia    Hypertension    Multiple sclerosis, secondary progressive    Pre-diabetes    Pulmonary embolus    Spasticity    Urgency incontinence    Vitamin D deficiency    Osteopenia of multiple sites     "Hypothyroidism     Advance Care Planning Advance Directive is on file.  ACP discussion was held with the patient during this visit. No but workbook given to discuss with family at home            Objective   Vitals:    25 1404   BP: 128/80   Pulse: 61   SpO2: 97%   Weight: 86.2 kg (190 lb)   Height: 160 cm (62.99\")   PainSc: 7    PainLoc: Back       Estimated body mass index is 33.67 kg/m² as calculated from the following:    Height as of this encounter: 160 cm (62.99\").    Weight as of this encounter: 86.2 kg (190 lb).    BMI is >= 30 and <35. (Class 1 Obesity). The following options were offered after discussion;: exercise counseling/recommendations and nutrition counseling/recommendations    Exam:  GEN: NAD  HEENT: MMM  CHEST: CTAB, no increased wob  CV: S1S2, RRR, no m/r/g darin    Does the patient have evidence of cognitive impairment? Yes, Minicog 1 but clock drawing acceptable                                                                                               Health  Risk Assessment    Smoking Status:  Social History     Tobacco Use   Smoking Status Never    Passive exposure: Never   Smokeless Tobacco Never     Alcohol Consumption:  Social History     Substance and Sexual Activity   Alcohol Use No       Fall Risk Screen  STEADI Fall Risk Assessment was completed, and patient is at MODERATE risk for falls. Assessment completed on:2025    Depression Screening   Little interest or pleasure in doing things? Not at all   Feeling down, depressed, or hopeless? Not at all   PHQ-2 Total Score 0      Health Habits and Functional and Cognitive Screenin/2/2025     2:02 PM   Functional & Cognitive Status   Do you have difficulty preparing food and eating? Yes   Do you have difficulty bathing yourself, getting dressed or grooming yourself? No   Do you have difficulty using the toilet? No   Do you have difficulty moving around from place to place? No   Do you have trouble with steps or getting out " of a bed or a chair? No   Current Diet Well Balanced Diet   Dental Exam Not up to date   Eye Exam Not up to date   Exercise (times per week) 0 times per week   Current Exercises Include No Regular Exercise   Do you need help using the phone?  No   Are you deaf or do you have serious difficulty hearing?  No   Do you need help to go to places out of walking distance? Yes   Do you need help shopping? Yes   Do you need help preparing meals?  Yes   Do you need help with housework?  Yes   Do you need help with laundry? Yes   Do you need help taking your medications? No   Do you need help managing money? No   Do you ever drive or ride in a car without wearing a seat belt? No   Have you felt unusual stress, anger or loneliness in the last month? No   Who do you live with? Alone   If you need help, do you have trouble finding someone available to you? No   Have you been bothered in the last four weeks by sexual problems? No   Do you have difficulty concentrating, remembering or making decisions? Yes           Age-appropriate Screening Schedule:  Refer to the list below for future screening recommendations based on patient's age, sex and/or medical conditions. Orders for these recommended tests are listed in the plan section. The patient has been provided with a written plan.    Health Maintenance List  Health Maintenance   Topic Date Due    COLORECTAL CANCER SCREENING  07/10/2022    RSV Vaccine - Adults (1 - 1-dose 75+ series) Never done    COVID-19 Vaccine (2 - 2024-25 season) 09/01/2024    ZOSTER VACCINE (2 of 2) 11/27/2024    ANNUAL WELLNESS VISIT  04/26/2025    INFLUENZA VACCINE  07/01/2025    LIPID PANEL  01/31/2026    DXA SCAN  02/23/2026    TDAP/TD VACCINES (3 - Td or Tdap) 10/01/2029    HEPATITIS C SCREENING  Completed    Pneumococcal Vaccine 50+  Completed    MAMMOGRAM  Discontinued                                                                                                                                                 CMS Preventative Services Quick Reference  Risk Factors Identified During Encounter  Urinary Incontinence: Medication adjustment made and Increased Myrbetriq from 25mg to 50mg  Dental Screening Recommended  Vision Screening Recommended    Discussed Rosa and she would like to stop screening for breast cancer.    Discussed Colonoscopy and she does not want to obtain cscope.     Lipids and A1C current 1/2025    Last DEXA was 2/2024. Vit D level 1/2025    Discussed ACP, diet and exercise, fall prevention, screenings that are age appropriate.     Signed updated CSA for Gabapentin.     MiniCog score was 1. Discussed memory and feels that she is doing ok. Her caregiver assists with a lot of activities and she is living at [facility].     The above risks/problems have been discussed with the patient.  Pertinent information has been shared with the patient in the After Visit Summary.  An After Visit Summary and PPPS were made available to the patient.    Follow Up:   Next Medicare Wellness visit to be scheduled in 1 year.     Assessment & Plan  OAB (overactive bladder)    Orders:    Mirabegron ER (MYRBETRIQ) 50 MG tablet sustained-release 24 hour 24 hr tablet; Take 50 mg by mouth Daily.    Primary hypertension      Orders:    amLODIPine (NORVASC) 10 MG tablet; Take 1 tablet by mouth Daily.    carvedilol (COREG) 25 MG tablet; Take 1 tablet by mouth 2 (Two) Times a Day With Meals.    valsartan (DIOVAN) 320 MG tablet; Take 1 tablet by mouth Daily.    Other hyperlipidemia       Orders:    atorvastatin (LIPITOR) 40 MG tablet; Take 1 tablet by mouth Every Night.    Generalized anxiety disorder      Orders:    busPIRone (BUSPAR) 15 MG tablet; Take 1 tablet by mouth 2 (Two) Times a Day. 2 tab po in AM and 1 tab po in PM    Anxiety and depression      Orders:    hydrOXYzine (ATARAX) 25 MG tablet; 0.5-1 tab po q8hr PRN anxiety    Allergic rhinitis, unspecified seasonality, unspecified trigger    Orders:     montelukast (SINGULAIR) 10 MG tablet; Take 1 tablet by mouth Every Night.    Gastroesophageal reflux disease without esophagitis    Orders:    omeprazole (priLOSEC) 40 MG capsule; Take 1 capsule by mouth Daily.    Constipation, unspecified constipation type    Orders:    polyethylene glycol (MIRALAX) 17 GM/SCOOP powder; Take 17 g by mouth Daily.    B12 deficiency    Orders:    vitamin B-12 (CYANOCOBALAMIN) 1000 MCG tablet; Take 1 tablet by mouth Daily.    Hypothyroidism, unspecified type    Orders:    levothyroxine (SYNTHROID, LEVOTHROID) 50 MCG tablet; Take 1 tablet by mouth Every Morning.    Medicare annual wellness visit, subsequent              Follow Up:   No follow-ups on file.

## 2025-05-02 NOTE — ASSESSMENT & PLAN NOTE
Orders:    levothyroxine (SYNTHROID, LEVOTHROID) 50 MCG tablet; Take 1 tablet by mouth Every Morning.

## 2025-05-29 RX ORDER — CARBAMAZEPINE 200 MG/1
200 TABLET ORAL 3 TIMES DAILY
Qty: 90 TABLET | Refills: 2 | Status: SHIPPED | OUTPATIENT
Start: 2025-05-29

## 2025-05-29 NOTE — TELEPHONE ENCOUNTER
"Caller: Adeola Odalis FOX \"JESSIKA\"    Relationship: Self    Best call back number: [phone]    Requested Prescriptions:   Requested Prescriptions     Pending Prescriptions Disp Refills    carBAMazepine (TEGretol) 200 MG tablet 90 tablet 1     Sig: Take 1 tablet by mouth 3 (Three) Times a Day.      Pharmacy where request should be sent: [facility] #[id number] [place], [place] - [address]  AT [address]  & [address] [facility] [phone] [facility] [phone] FX     Last office visit with prescribing clinician: 12/5/2024   Last telemedicine visit with prescribing clinician: Visit date not found   Next office visit with prescribing clinician: 9/11/2025     Additional details provided by patient: PT REPORTS SHE HAS APPROXIMATELY 5 DAYS OF CARBAMAZEPINE MEDICATION LEFT.    Does the patient have less than a 3 day supply?:  [] Yes  [x] No    Would you like a call back once the refill request has been completed?: [] Yes  [x] No    If the office needs to give you a call back, can they leave a voicemail?: [] Yes  [x] No    PLEASE REVIEW AND ADVISE.    Jeri Kinsey Rep   05/29/25 11:21 EDT   "

## 2025-05-29 NOTE — TELEPHONE ENCOUNTER
REFILLED 90 DAYS TO LAST UNTIL PATIENTS APPOINTMENT    Rx Refill Note  Requested Prescriptions     Pending Prescriptions Disp Refills    carBAMazepine (TEGretol) 200 MG tablet 90 tablet 1     Sig: Take 1 tablet by mouth 3 (Three) Times a Day.      Last filled:  04/14/25 W/1  Last office visit with prescribing clinician: 12/5/2024      Next office visit with prescribing clinician: 9/11/2025     Liliana Gonzales MA  05/29/25, 13:29 EDT

## 2025-06-02 DIAGNOSIS — G62.9 PERIPHERAL POLYNEUROPATHY: ICD-10-CM

## 2025-06-02 NOTE — TELEPHONE ENCOUNTER
"Caller: AdeolaOdalis go \"JESSIKA\"    Relationship: Self    Best call back number: [phone]   Requested Prescriptions:   Requested Prescriptions     Pending Prescriptions Disp Refills    gabapentin (NEURONTIN) 300 MG capsule 60 capsule 0     Sig: Take 1 capsule by mouth 2 (Two) Times a Day.        Pharmacy where request should be sent: [facility] #[id number] [place], [place] - [address]  AT [address] [address] [facility]  & [address] [facility] [phone] [facility] [phone] FX     Last office visit with prescribing clinician: 5/2/2025   Last telemedicine visit with prescribing clinician: Visit date not found   Next office visit with prescribing clinician: 8/7/2025     Additional details provided by patient: PATIENT HAS 3 DAYS LEFT OF MEDICATION     Does the patient have less than a 3 day supply:  [x] Yes  [] No    Would you like a call back once the refill request has been completed: [] Yes [x] No    If the office needs to give you a call back, can they leave a voicemail: [] Yes [x] No    Jessica Tran MA   06/02/25 15:01 EDT         "

## 2025-06-04 RX ORDER — GABAPENTIN 300 MG/1
300 CAPSULE ORAL 2 TIMES DAILY
Qty: 60 CAPSULE | Refills: 0 | Status: SHIPPED | OUTPATIENT
Start: 2025-06-04

## 2025-06-04 NOTE — TELEPHONE ENCOUNTER
Rx Refill Note  Requested Prescriptions     Pending Prescriptions Disp Refills    gabapentin (NEURONTIN) 300 MG capsule 60 capsule 0     Sig: Take 1 capsule by mouth 2 (Two) Times a Day.      Last office visit with prescribing clinician: 5/2/2025   Last telemedicine visit with prescribing clinician: Visit date not found   Next office visit with prescribing clinician: 8/7/2025     UDS: 10/31/24  CSA: 5/2/25

## 2025-06-16 ENCOUNTER — TELEPHONE (OUTPATIENT)
Dept: NEUROLOGY | Facility: CLINIC | Age: 75
End: 2025-06-16
Payer: MEDICARE

## 2025-06-16 NOTE — TELEPHONE ENCOUNTER
Caller: MUNDO     Relationship to patient:   SELF    Best call back number: [phone]     Provider: BRODY    Medication PA needed: FLUOXETINE 60 MG     Reason for call/Prior Auth: INS SAID NEED P.A AND REASON SHE NEEDS TO TAKE?     PT HAS ONE WEEK LEFT OF RX     PLEASE ADVISE

## 2025-06-17 NOTE — TELEPHONE ENCOUNTER
Spoke to patient to inform that Dr Irene Bailey's office will take care of the prior authorization for this medication.  She expressed understanding and appreciation.

## 2025-06-18 ENCOUNTER — TELEPHONE (OUTPATIENT)
Age: 75
End: 2025-06-18
Payer: MEDICARE

## 2025-06-18 NOTE — TELEPHONE ENCOUNTER
"    Caller: Odalis Mir \"JESSIKA\"    Relationship to patient: Self      Best call back number: [phone]     Provider: MOUNIKA SUE     Medication PA needed: FLUOXETINE 60 MG    Reason for call/Prior Auth: INSURANCE NO LONGER WANT TO COVER MEDICATION       "

## 2025-06-19 NOTE — TELEPHONE ENCOUNTER
Caller: LASHON NICHOLE    Relationship: Emergency Contact    Best call back number: [phone]    Which medication are you concerned about: FLUOXETINE    Who prescribed you this medication: DR SUE    What are your concerns: PATIENT STATES INSURANCE WILL NOT COVER MEDICATION AND WANTS TO CHARGE $200 AND PATIENT CAN NOT AFFORD THAT AND WOULD LIKE AN ALTERNATIVE MEDICATION SENT TO PHARMACY. PLEASE ADVISE

## 2025-06-20 NOTE — TELEPHONE ENCOUNTER
Called and spoke with pharmacy.   Drew hernadez    Gave verbal of  Prozac 40mg capusle once a day  [id number] once a day      Called and informed patient. Pt verbalized understanding

## 2025-06-20 NOTE — TELEPHONE ENCOUNTER
"Caller: Odalis Mir \"JESSIKA\"    Relationship: Self    Best call back number:  [phone] (Mobile)     What was the call regarding: PATIENT IS REQUESTING A CALL BACK ABOUT THE COST OF THE MEDICATION     PLEASE CALL AND ADVISE      "

## 2025-07-12 DIAGNOSIS — G62.9 PERIPHERAL POLYNEUROPATHY: ICD-10-CM

## 2025-07-14 RX ORDER — GABAPENTIN 300 MG/1
300 CAPSULE ORAL 2 TIMES DAILY
Qty: 60 CAPSULE | Refills: 0 | Status: SHIPPED | OUTPATIENT
Start: 2025-07-14

## 2025-07-14 NOTE — TELEPHONE ENCOUNTER
Rx Refill Note    Requested Prescriptions     Pending Prescriptions Disp Refills    gabapentin (NEURONTIN) 300 MG capsule [Pharmacy Med Name: GABAPENTIN 300MG CAPSULES] 60 capsule      Sig: TAKE 1 CAPSULE BY MOUTH TWICE DAILY      Last office visit with prescribing clinician: Visit date not found   Last telemedicine visit with prescribing clinician: Visit date not found   Next office visit with prescribing clinician: Visit date not found

## 2025-08-07 ENCOUNTER — OFFICE VISIT (OUTPATIENT)
Age: 75
End: 2025-08-07
Payer: MEDICARE

## 2025-08-07 VITALS
HEART RATE: 64 BPM | HEIGHT: 63 IN | OXYGEN SATURATION: 94 % | DIASTOLIC BLOOD PRESSURE: 82 MMHG | SYSTOLIC BLOOD PRESSURE: 124 MMHG | WEIGHT: 197 LBS | BODY MASS INDEX: 34.91 KG/M2

## 2025-08-07 DIAGNOSIS — F41.9 ANXIETY AND DEPRESSION: Primary | ICD-10-CM

## 2025-08-07 DIAGNOSIS — G40.109 SEIZURE, TEMPORAL LOBE: Chronic | ICD-10-CM

## 2025-08-07 DIAGNOSIS — F32.A ANXIETY AND DEPRESSION: Primary | ICD-10-CM

## 2025-08-07 DIAGNOSIS — J30.9 ALLERGIC RHINITIS, UNSPECIFIED SEASONALITY, UNSPECIFIED TRIGGER: ICD-10-CM

## 2025-08-07 DIAGNOSIS — E55.9 VITAMIN D DEFICIENCY: ICD-10-CM

## 2025-08-07 DIAGNOSIS — R73.03 PRE-DIABETES: ICD-10-CM

## 2025-08-07 DIAGNOSIS — I10 PRIMARY HYPERTENSION: ICD-10-CM

## 2025-08-07 DIAGNOSIS — M85.89 OSTEOPENIA OF MULTIPLE SITES: ICD-10-CM

## 2025-08-07 DIAGNOSIS — E03.9 HYPOTHYROIDISM, UNSPECIFIED TYPE: ICD-10-CM

## 2025-08-07 DIAGNOSIS — G35 MULTIPLE SCLEROSIS, SECONDARY PROGRESSIVE: ICD-10-CM

## 2025-08-07 DIAGNOSIS — K21.9 GASTROESOPHAGEAL REFLUX DISEASE WITHOUT ESOPHAGITIS: ICD-10-CM

## 2025-08-07 DIAGNOSIS — K63.5 POLYP OF COLON, UNSPECIFIED PART OF COLON, UNSPECIFIED TYPE: ICD-10-CM

## 2025-08-07 DIAGNOSIS — E78.2 MIXED HYPERLIPIDEMIA: ICD-10-CM

## 2025-08-07 RX ORDER — BUPROPION HYDROCHLORIDE 150 MG/1
150 TABLET ORAL DAILY
Qty: 30 TABLET | Refills: 1 | Status: SHIPPED | OUTPATIENT
Start: 2025-08-07

## (undated) DEVICE — GOWN,REINF,POLY,ECL,PP SLV,XL: Brand: MEDLINE

## (undated) DEVICE — SPNG GZ STRL 2S 4X4 12PLY

## (undated) DEVICE — BIT DRL FAST 2MM

## (undated) DEVICE — ANTIBACTERIAL UNDYED BRAIDED (POLYGLACTIN 910), SYNTHETIC ABSORBABLE SUTURE: Brand: COATED VICRYL

## (undated) DEVICE — UNDERCAST PADDING: Brand: DEROYAL

## (undated) DEVICE — BNDG ELAS ELITE V/CLOSE 4IN 5YD LF STRL

## (undated) DEVICE — DRSNG GZ CURAD XEROFORM NONADHR OVERWRAP 5X9IN

## (undated) DEVICE — 3M™ STERI-DRAPE™ U-DRAPE 1015: Brand: STERI-DRAPE™

## (undated) DEVICE — ENCORE® LATEX MICRO SIZE 8, STERILE LATEX POWDER-FREE SURGICAL GLOVE: Brand: ENCORE

## (undated) DEVICE — PK EXTREM UPPR 10

## (undated) DEVICE — DRAPE,REIN 53X77,STERILE: Brand: MEDLINE

## (undated) DEVICE — INTENDED FOR TISSUE SEPARATION, AND OTHER PROCEDURES THAT REQUIRE A SHARP SURGICAL BLADE TO PUNCTURE OR CUT.: Brand: BARD-PARKER ® STAINLESS STEEL BLADES

## (undated) DEVICE — SPLNT PLSTR CAST 5IN

## (undated) DEVICE — DRP C/ARM MINI

## (undated) DEVICE — 2000CC GUARDIAN II: Brand: GUARDIAN

## (undated) DEVICE — APPL CHLORAPREP W/TINT 26ML BLU

## (undated) DEVICE — BIT DRL SOLID CALIB 2.5MM

## (undated) DEVICE — SPNG GZ WOVN 4X4IN 12PLY 10/BX STRL

## (undated) DEVICE — Device